# Patient Record
Sex: MALE | Race: ASIAN | NOT HISPANIC OR LATINO | Employment: UNEMPLOYED | URBAN - METROPOLITAN AREA
[De-identification: names, ages, dates, MRNs, and addresses within clinical notes are randomized per-mention and may not be internally consistent; named-entity substitution may affect disease eponyms.]

---

## 2017-01-12 ENCOUNTER — COMMUNICATION - HEALTHEAST (OUTPATIENT)
Dept: FAMILY MEDICINE | Facility: CLINIC | Age: 77
End: 2017-01-12

## 2017-01-12 DIAGNOSIS — K59.00 CONSTIPATION, UNSPECIFIED CONSTIPATION TYPE: ICD-10-CM

## 2017-02-07 ENCOUNTER — COMMUNICATION - HEALTHEAST (OUTPATIENT)
Dept: NURSING | Facility: CLINIC | Age: 77
End: 2017-02-07

## 2017-02-09 ENCOUNTER — OFFICE VISIT - HEALTHEAST (OUTPATIENT)
Dept: FAMILY MEDICINE | Facility: CLINIC | Age: 77
End: 2017-02-09

## 2017-02-09 DIAGNOSIS — R63.0 LOSS OF APPETITE: ICD-10-CM

## 2017-02-09 DIAGNOSIS — G47.00 INSOMNIA: ICD-10-CM

## 2017-02-09 DIAGNOSIS — K59.00 CONSTIPATION, UNSPECIFIED CONSTIPATION TYPE: ICD-10-CM

## 2017-02-09 DIAGNOSIS — R42 DIZZINESS: ICD-10-CM

## 2017-02-09 DIAGNOSIS — R53.83 FATIGUE: ICD-10-CM

## 2017-02-09 DIAGNOSIS — R32 INCONTINENCE: ICD-10-CM

## 2017-02-09 ASSESSMENT — MIFFLIN-ST. JEOR: SCORE: 1062.16

## 2017-02-14 ENCOUNTER — COMMUNICATION - HEALTHEAST (OUTPATIENT)
Dept: NURSING | Facility: CLINIC | Age: 77
End: 2017-02-14

## 2017-02-16 ENCOUNTER — COMMUNICATION - HEALTHEAST (OUTPATIENT)
Dept: FAMILY MEDICINE | Facility: CLINIC | Age: 77
End: 2017-02-16

## 2017-02-16 ENCOUNTER — RECORDS - HEALTHEAST (OUTPATIENT)
Dept: ADMINISTRATIVE | Facility: OTHER | Age: 77
End: 2017-02-16

## 2017-02-20 ENCOUNTER — AMBULATORY - HEALTHEAST (OUTPATIENT)
Dept: CARE COORDINATION | Facility: CLINIC | Age: 77
End: 2017-02-20

## 2017-03-21 ENCOUNTER — OFFICE VISIT - HEALTHEAST (OUTPATIENT)
Dept: FAMILY MEDICINE | Facility: CLINIC | Age: 77
End: 2017-03-21

## 2017-03-21 DIAGNOSIS — G44.85 PRIMARY STABBING HEADACHE: ICD-10-CM

## 2017-03-21 ASSESSMENT — MIFFLIN-ST. JEOR: SCORE: 1061.87

## 2017-04-19 ENCOUNTER — OFFICE VISIT - HEALTHEAST (OUTPATIENT)
Dept: FAMILY MEDICINE | Facility: CLINIC | Age: 77
End: 2017-04-19

## 2017-04-19 DIAGNOSIS — R63.0 LOSS OF APPETITE: ICD-10-CM

## 2017-04-19 DIAGNOSIS — K59.00 CONSTIPATION: ICD-10-CM

## 2017-04-19 DIAGNOSIS — H90.5 SNHL (SENSORINEURAL HEARING LOSS): ICD-10-CM

## 2017-04-19 DIAGNOSIS — G47.00 INSOMNIA: ICD-10-CM

## 2017-04-25 ENCOUNTER — COMMUNICATION - HEALTHEAST (OUTPATIENT)
Dept: FAMILY MEDICINE | Facility: CLINIC | Age: 77
End: 2017-04-25

## 2017-04-25 DIAGNOSIS — K59.00 CONSTIPATION: ICD-10-CM

## 2017-07-19 ENCOUNTER — AMBULATORY - HEALTHEAST (OUTPATIENT)
Dept: FAMILY MEDICINE | Facility: CLINIC | Age: 77
End: 2017-07-19

## 2017-07-19 ENCOUNTER — OFFICE VISIT - HEALTHEAST (OUTPATIENT)
Dept: FAMILY MEDICINE | Facility: CLINIC | Age: 77
End: 2017-07-19

## 2017-07-19 DIAGNOSIS — G47.00 INSOMNIA: ICD-10-CM

## 2017-07-19 DIAGNOSIS — M25.50 MULTIPLE JOINT PAIN: ICD-10-CM

## 2017-07-19 DIAGNOSIS — M54.9 BACK PAIN: ICD-10-CM

## 2017-07-19 DIAGNOSIS — R41.3 MEMORY DIFFICULTY: ICD-10-CM

## 2017-07-19 ASSESSMENT — MIFFLIN-ST. JEOR: SCORE: 1071.8

## 2017-07-20 LAB — SYPHILIS RPR SCREEN - HISTORICAL: NORMAL

## 2017-08-10 ENCOUNTER — AMBULATORY - HEALTHEAST (OUTPATIENT)
Dept: CARE COORDINATION | Facility: CLINIC | Age: 77
End: 2017-08-10

## 2017-08-10 ENCOUNTER — COMMUNICATION - HEALTHEAST (OUTPATIENT)
Dept: NURSING | Facility: CLINIC | Age: 77
End: 2017-08-10

## 2017-10-19 ENCOUNTER — OFFICE VISIT - HEALTHEAST (OUTPATIENT)
Dept: FAMILY MEDICINE | Facility: CLINIC | Age: 77
End: 2017-10-19

## 2017-10-19 DIAGNOSIS — R41.3 MEMORY DIFFICULTY: ICD-10-CM

## 2017-10-19 DIAGNOSIS — M54.9 BACK PAIN: ICD-10-CM

## 2017-10-19 DIAGNOSIS — R63.0 LOSS OF APPETITE: ICD-10-CM

## 2017-10-19 DIAGNOSIS — F32.A DEPRESSION: ICD-10-CM

## 2017-10-19 DIAGNOSIS — G47.00 INSOMNIA: ICD-10-CM

## 2017-10-19 DIAGNOSIS — Z23 IMMUNIZATION DUE: ICD-10-CM

## 2017-10-19 ASSESSMENT — MIFFLIN-ST. JEOR: SCORE: 1093.91

## 2017-10-30 ENCOUNTER — HOSPITAL ENCOUNTER (OUTPATIENT)
Dept: CT IMAGING | Facility: HOSPITAL | Age: 77
Discharge: HOME | End: 2017-10-30
Attending: FAMILY MEDICINE

## 2017-10-30 DIAGNOSIS — R41.3 MEMORY DIFFICULTY: ICD-10-CM

## 2018-02-16 ENCOUNTER — OFFICE VISIT - HEALTHEAST (OUTPATIENT)
Dept: FAMILY MEDICINE | Facility: CLINIC | Age: 78
End: 2018-02-16

## 2018-02-16 DIAGNOSIS — L30.9 ECZEMA: ICD-10-CM

## 2018-02-16 DIAGNOSIS — R63.0 LOSS OF APPETITE: ICD-10-CM

## 2018-02-16 DIAGNOSIS — H90.5 SNHL (SENSORINEURAL HEARING LOSS): ICD-10-CM

## 2018-02-16 DIAGNOSIS — F32.A DEPRESSION: ICD-10-CM

## 2018-02-16 DIAGNOSIS — M54.9 BACK PAIN: ICD-10-CM

## 2018-02-16 ASSESSMENT — MIFFLIN-ST. JEOR: SCORE: 1103.83

## 2018-05-18 ENCOUNTER — OFFICE VISIT - HEALTHEAST (OUTPATIENT)
Dept: FAMILY MEDICINE | Facility: CLINIC | Age: 78
End: 2018-05-18

## 2018-05-18 DIAGNOSIS — H90.5 SNHL (SENSORINEURAL HEARING LOSS): ICD-10-CM

## 2018-05-18 DIAGNOSIS — R63.0 LOSS OF APPETITE: ICD-10-CM

## 2018-05-18 DIAGNOSIS — F32.A DEPRESSION: ICD-10-CM

## 2018-05-18 DIAGNOSIS — M54.9 BACK PAIN: ICD-10-CM

## 2018-05-18 ASSESSMENT — MIFFLIN-ST. JEOR: SCORE: 1126.51

## 2018-07-20 ENCOUNTER — OFFICE VISIT - HEALTHEAST (OUTPATIENT)
Dept: FAMILY MEDICINE | Facility: CLINIC | Age: 78
End: 2018-07-20

## 2018-07-20 ENCOUNTER — COMMUNICATION - HEALTHEAST (OUTPATIENT)
Dept: FAMILY MEDICINE | Facility: CLINIC | Age: 78
End: 2018-07-20

## 2018-07-20 DIAGNOSIS — M54.9 BACK PAIN: ICD-10-CM

## 2018-07-20 DIAGNOSIS — K08.89 LOOSE, TEETH: ICD-10-CM

## 2018-07-20 DIAGNOSIS — H91.90 HEARING LOSS: ICD-10-CM

## 2018-07-20 DIAGNOSIS — F32.0 MILD SINGLE CURRENT EPISODE OF MAJOR DEPRESSIVE DISORDER (H): ICD-10-CM

## 2018-07-20 DIAGNOSIS — R63.0 LOSS OF APPETITE: ICD-10-CM

## 2018-07-20 ASSESSMENT — MIFFLIN-ST. JEOR: SCORE: 1126.79

## 2018-10-24 ENCOUNTER — OFFICE VISIT - HEALTHEAST (OUTPATIENT)
Dept: FAMILY MEDICINE | Facility: CLINIC | Age: 78
End: 2018-10-24

## 2018-10-24 DIAGNOSIS — Z22.7 TB LUNG, LATENT: ICD-10-CM

## 2018-10-24 DIAGNOSIS — Z23 NEED FOR INFLUENZA VACCINATION: ICD-10-CM

## 2018-10-24 DIAGNOSIS — G47.00 INSOMNIA: ICD-10-CM

## 2018-10-24 DIAGNOSIS — M54.9 BACK PAIN: ICD-10-CM

## 2018-10-24 DIAGNOSIS — F32.0 MILD SINGLE CURRENT EPISODE OF MAJOR DEPRESSIVE DISORDER (H): ICD-10-CM

## 2018-10-24 DIAGNOSIS — H91.90 HEARING LOSS: ICD-10-CM

## 2018-10-24 DIAGNOSIS — K08.109 TEETH MISSING: ICD-10-CM

## 2018-10-24 ASSESSMENT — MIFFLIN-ST. JEOR: SCORE: 1111.77

## 2019-03-14 ENCOUNTER — RECORDS - HEALTHEAST (OUTPATIENT)
Dept: ADMINISTRATIVE | Facility: OTHER | Age: 79
End: 2019-03-14

## 2019-04-02 ENCOUNTER — COMMUNICATION - HEALTHEAST (OUTPATIENT)
Dept: FAMILY MEDICINE | Facility: CLINIC | Age: 79
End: 2019-04-02

## 2019-05-03 ENCOUNTER — OFFICE VISIT - HEALTHEAST (OUTPATIENT)
Dept: FAMILY MEDICINE | Facility: CLINIC | Age: 79
End: 2019-05-03

## 2019-05-03 DIAGNOSIS — M54.9 BACK PAIN: ICD-10-CM

## 2019-05-03 DIAGNOSIS — H04.123 DRY EYES: ICD-10-CM

## 2019-05-03 DIAGNOSIS — G89.29 CHRONIC PAIN OF BOTH KNEES: ICD-10-CM

## 2019-05-03 DIAGNOSIS — K08.109 TEETH MISSING: ICD-10-CM

## 2019-05-03 DIAGNOSIS — F32.0 MILD SINGLE CURRENT EPISODE OF MAJOR DEPRESSIVE DISORDER (H): ICD-10-CM

## 2019-05-03 DIAGNOSIS — M25.561 CHRONIC PAIN OF BOTH KNEES: ICD-10-CM

## 2019-05-03 DIAGNOSIS — R63.0 LOSS OF APPETITE: ICD-10-CM

## 2019-05-03 DIAGNOSIS — M25.562 CHRONIC PAIN OF BOTH KNEES: ICD-10-CM

## 2019-05-03 ASSESSMENT — MIFFLIN-ST. JEOR: SCORE: 1102.7

## 2019-05-05 ENCOUNTER — COMMUNICATION - HEALTHEAST (OUTPATIENT)
Dept: FAMILY MEDICINE | Facility: CLINIC | Age: 79
End: 2019-05-05

## 2019-05-09 ENCOUNTER — AMBULATORY - HEALTHEAST (OUTPATIENT)
Dept: FAMILY MEDICINE | Facility: CLINIC | Age: 79
End: 2019-05-09

## 2019-05-09 DIAGNOSIS — H04.123 DRY EYES: ICD-10-CM

## 2019-05-16 ENCOUNTER — OFFICE VISIT - HEALTHEAST (OUTPATIENT)
Dept: FAMILY MEDICINE | Facility: CLINIC | Age: 79
End: 2019-05-16

## 2019-05-16 DIAGNOSIS — G47.00 INSOMNIA, UNSPECIFIED TYPE: ICD-10-CM

## 2019-05-16 DIAGNOSIS — R55 SYNCOPE, UNSPECIFIED SYNCOPE TYPE: ICD-10-CM

## 2019-05-16 DIAGNOSIS — F32.A DEPRESSION, UNSPECIFIED DEPRESSION TYPE: ICD-10-CM

## 2019-05-16 DIAGNOSIS — R42 DIZZINESS: ICD-10-CM

## 2019-05-16 DIAGNOSIS — M25.562 LEFT KNEE PAIN, UNSPECIFIED CHRONICITY: ICD-10-CM

## 2019-05-16 LAB
ALBUMIN SERPL-MCNC: 4.3 G/DL (ref 3.5–5)
ALP SERPL-CCNC: 69 U/L (ref 45–120)
ALT SERPL W P-5'-P-CCNC: 27 U/L (ref 0–45)
ANION GAP SERPL CALCULATED.3IONS-SCNC: 12 MMOL/L (ref 5–18)
AST SERPL W P-5'-P-CCNC: 38 U/L (ref 0–40)
BASOPHILS # BLD AUTO: 0 THOU/UL (ref 0–0.2)
BASOPHILS NFR BLD AUTO: 0 % (ref 0–2)
BILIRUB SERPL-MCNC: 0.5 MG/DL (ref 0–1)
BUN SERPL-MCNC: 23 MG/DL (ref 8–28)
CALCIUM SERPL-MCNC: 9.9 MG/DL (ref 8.5–10.5)
CHLORIDE BLD-SCNC: 104 MMOL/L (ref 98–107)
CO2 SERPL-SCNC: 23 MMOL/L (ref 22–31)
CREAT SERPL-MCNC: 1.04 MG/DL (ref 0.7–1.3)
EOSINOPHIL # BLD AUTO: 0.4 THOU/UL (ref 0–0.4)
EOSINOPHIL NFR BLD AUTO: 5 % (ref 0–6)
ERYTHROCYTE [DISTWIDTH] IN BLOOD BY AUTOMATED COUNT: 12.4 % (ref 11–14.5)
GFR SERPL CREATININE-BSD FRML MDRD: >60 ML/MIN/1.73M2
GLUCOSE BLD-MCNC: 81 MG/DL (ref 70–125)
HCT VFR BLD AUTO: 42 % (ref 40–54)
HGB BLD-MCNC: 13.9 G/DL (ref 14–18)
LYMPHOCYTES # BLD AUTO: 2 THOU/UL (ref 0.8–4.4)
LYMPHOCYTES NFR BLD AUTO: 28 % (ref 20–40)
MCH RBC QN AUTO: 27.8 PG (ref 27–34)
MCHC RBC AUTO-ENTMCNC: 33.1 G/DL (ref 32–36)
MCV RBC AUTO: 84 FL (ref 80–100)
MONOCYTES # BLD AUTO: 0.6 THOU/UL (ref 0–0.9)
MONOCYTES NFR BLD AUTO: 9 % (ref 2–10)
NEUTROPHILS # BLD AUTO: 4.2 THOU/UL (ref 2–7.7)
NEUTROPHILS NFR BLD AUTO: 58 % (ref 50–70)
PLATELET # BLD AUTO: 220 THOU/UL (ref 140–440)
PMV BLD AUTO: 7.6 FL (ref 7–10)
POTASSIUM BLD-SCNC: 4.4 MMOL/L (ref 3.5–5)
PROT SERPL-MCNC: 8 G/DL (ref 6–8)
RBC # BLD AUTO: 5 MILL/UL (ref 4.4–6.2)
SODIUM SERPL-SCNC: 139 MMOL/L (ref 136–145)
WBC: 7.3 THOU/UL (ref 4–11)

## 2019-05-16 ASSESSMENT — MIFFLIN-ST. JEOR: SCORE: 1098.16

## 2019-05-20 ENCOUNTER — HOSPITAL ENCOUNTER (OUTPATIENT)
Dept: ULTRASOUND IMAGING | Facility: HOSPITAL | Age: 79
Discharge: HOME | End: 2019-05-20
Attending: FAMILY MEDICINE

## 2019-05-20 ENCOUNTER — HOSPITAL ENCOUNTER (OUTPATIENT)
Dept: CT IMAGING | Facility: HOSPITAL | Age: 79
Discharge: HOME | End: 2019-05-20
Attending: FAMILY MEDICINE

## 2019-05-20 DIAGNOSIS — R55 SYNCOPE, UNSPECIFIED SYNCOPE TYPE: ICD-10-CM

## 2019-06-14 ENCOUNTER — OFFICE VISIT - HEALTHEAST (OUTPATIENT)
Dept: FAMILY MEDICINE | Facility: CLINIC | Age: 79
End: 2019-06-14

## 2019-06-14 ENCOUNTER — COMMUNICATION - HEALTHEAST (OUTPATIENT)
Dept: NURSING | Facility: CLINIC | Age: 79
End: 2019-06-14

## 2019-06-14 DIAGNOSIS — M54.50 BILATERAL LOW BACK PAIN WITHOUT SCIATICA, UNSPECIFIED CHRONICITY: ICD-10-CM

## 2019-06-14 DIAGNOSIS — G47.00 INSOMNIA, UNSPECIFIED TYPE: ICD-10-CM

## 2019-06-14 DIAGNOSIS — H90.3 SENSORINEURAL HEARING LOSS (SNHL) OF BOTH EARS: ICD-10-CM

## 2019-06-14 DIAGNOSIS — R41.3 MEMORY DIFFICULTY: ICD-10-CM

## 2019-06-14 DIAGNOSIS — F32.0 MILD SINGLE CURRENT EPISODE OF MAJOR DEPRESSIVE DISORDER (H): ICD-10-CM

## 2019-06-14 ASSESSMENT — MIFFLIN-ST. JEOR: SCORE: 1108.93

## 2019-06-24 ENCOUNTER — RECORDS - HEALTHEAST (OUTPATIENT)
Dept: ADMINISTRATIVE | Facility: OTHER | Age: 79
End: 2019-06-24

## 2019-07-02 ENCOUNTER — COMMUNICATION - HEALTHEAST (OUTPATIENT)
Dept: NURSING | Facility: CLINIC | Age: 79
End: 2019-07-02

## 2019-07-08 ENCOUNTER — COMMUNICATION - HEALTHEAST (OUTPATIENT)
Dept: NURSING | Facility: CLINIC | Age: 79
End: 2019-07-08

## 2019-07-11 ENCOUNTER — COMMUNICATION - HEALTHEAST (OUTPATIENT)
Dept: NURSING | Facility: CLINIC | Age: 79
End: 2019-07-11

## 2019-07-17 ENCOUNTER — RECORDS - HEALTHEAST (OUTPATIENT)
Dept: GENERAL RADIOLOGY | Facility: CLINIC | Age: 79
End: 2019-07-17

## 2019-07-17 ENCOUNTER — OFFICE VISIT - HEALTHEAST (OUTPATIENT)
Dept: FAMILY MEDICINE | Facility: CLINIC | Age: 79
End: 2019-07-17

## 2019-07-17 DIAGNOSIS — M25.562 PAIN IN LEFT KNEE: ICD-10-CM

## 2019-07-17 DIAGNOSIS — G89.29 OTHER CHRONIC PAIN: ICD-10-CM

## 2019-07-17 DIAGNOSIS — M54.50 BILATERAL LOW BACK PAIN WITHOUT SCIATICA, UNSPECIFIED CHRONICITY: ICD-10-CM

## 2019-07-17 DIAGNOSIS — R42 DIZZINESS: ICD-10-CM

## 2019-07-17 DIAGNOSIS — M25.562 CHRONIC PAIN OF LEFT KNEE: ICD-10-CM

## 2019-07-17 DIAGNOSIS — Z02.89 ENCOUNTER FOR COMPLETION OF FORM WITH PATIENT: ICD-10-CM

## 2019-07-17 DIAGNOSIS — G89.29 CHRONIC PAIN OF LEFT KNEE: ICD-10-CM

## 2019-07-17 ASSESSMENT — MIFFLIN-ST. JEOR: SCORE: 1116.3

## 2019-07-18 ENCOUNTER — COMMUNICATION - HEALTHEAST (OUTPATIENT)
Dept: FAMILY MEDICINE | Facility: CLINIC | Age: 79
End: 2019-07-18

## 2019-07-19 ENCOUNTER — COMMUNICATION - HEALTHEAST (OUTPATIENT)
Dept: NURSING | Facility: CLINIC | Age: 79
End: 2019-07-19

## 2019-07-22 ENCOUNTER — COMMUNICATION - HEALTHEAST (OUTPATIENT)
Dept: FAMILY MEDICINE | Facility: CLINIC | Age: 79
End: 2019-07-22

## 2019-07-22 DIAGNOSIS — M25.562 CHRONIC PAIN OF LEFT KNEE: ICD-10-CM

## 2019-07-22 DIAGNOSIS — G89.29 CHRONIC PAIN OF LEFT KNEE: ICD-10-CM

## 2019-07-22 DIAGNOSIS — M47.817 FACET ARTHRITIS OF LUMBOSACRAL REGION: ICD-10-CM

## 2019-08-05 ENCOUNTER — HOSPITAL ENCOUNTER (OUTPATIENT)
Dept: PHYSICAL MEDICINE AND REHAB | Facility: CLINIC | Age: 79
Discharge: HOME | End: 2019-08-05
Attending: FAMILY MEDICINE

## 2019-08-05 DIAGNOSIS — M54.50 CHRONIC BILATERAL LOW BACK PAIN WITHOUT SCIATICA: ICD-10-CM

## 2019-08-05 DIAGNOSIS — M25.562 CHRONIC PAIN OF LEFT KNEE: ICD-10-CM

## 2019-08-05 DIAGNOSIS — M51.369 DDD (DEGENERATIVE DISC DISEASE), LUMBAR: ICD-10-CM

## 2019-08-05 DIAGNOSIS — R20.0 NUMBNESS AND TINGLING OF LEFT LOWER EXTREMITY: ICD-10-CM

## 2019-08-05 DIAGNOSIS — R20.2 NUMBNESS AND TINGLING OF LEFT LOWER EXTREMITY: ICD-10-CM

## 2019-08-05 DIAGNOSIS — G89.29 CHRONIC PAIN OF LEFT KNEE: ICD-10-CM

## 2019-08-05 DIAGNOSIS — G89.29 CHRONIC BILATERAL LOW BACK PAIN WITHOUT SCIATICA: ICD-10-CM

## 2019-08-05 DIAGNOSIS — M47.816 LUMBAR FACET ARTHROPATHY: ICD-10-CM

## 2019-08-05 ASSESSMENT — MIFFLIN-ST. JEOR: SCORE: 1093.62

## 2019-08-08 ENCOUNTER — OFFICE VISIT - HEALTHEAST (OUTPATIENT)
Dept: PHYSICAL THERAPY | Facility: REHABILITATION | Age: 79
End: 2019-08-08

## 2019-08-08 DIAGNOSIS — M62.81 GENERALIZED MUSCLE WEAKNESS: ICD-10-CM

## 2019-08-08 DIAGNOSIS — R26.81 UNSTEADINESS ON FEET: ICD-10-CM

## 2019-08-08 DIAGNOSIS — M54.42 CHRONIC BILATERAL LOW BACK PAIN WITH LEFT-SIDED SCIATICA: ICD-10-CM

## 2019-08-08 DIAGNOSIS — G89.29 CHRONIC BILATERAL LOW BACK PAIN WITH LEFT-SIDED SCIATICA: ICD-10-CM

## 2019-08-13 ENCOUNTER — OFFICE VISIT - HEALTHEAST (OUTPATIENT)
Dept: AUDIOLOGY | Facility: CLINIC | Age: 79
End: 2019-08-13

## 2019-08-13 DIAGNOSIS — H90.3 SENSORINEURAL HEARING LOSS, BILATERAL: ICD-10-CM

## 2019-08-20 ENCOUNTER — RECORDS - HEALTHEAST (OUTPATIENT)
Dept: ADMINISTRATIVE | Facility: OTHER | Age: 79
End: 2019-08-20

## 2019-08-23 ENCOUNTER — HOSPITAL ENCOUNTER (OUTPATIENT)
Dept: MRI IMAGING | Facility: HOSPITAL | Age: 79
Discharge: HOME | End: 2019-08-23

## 2019-08-23 DIAGNOSIS — G89.29 CHRONIC BILATERAL LOW BACK PAIN WITHOUT SCIATICA: ICD-10-CM

## 2019-08-23 DIAGNOSIS — M51.369 DDD (DEGENERATIVE DISC DISEASE), LUMBAR: ICD-10-CM

## 2019-08-23 DIAGNOSIS — R20.2 NUMBNESS AND TINGLING OF LEFT LOWER EXTREMITY: ICD-10-CM

## 2019-08-23 DIAGNOSIS — M54.50 CHRONIC BILATERAL LOW BACK PAIN WITHOUT SCIATICA: ICD-10-CM

## 2019-08-23 DIAGNOSIS — R20.0 NUMBNESS AND TINGLING OF LEFT LOWER EXTREMITY: ICD-10-CM

## 2019-08-23 DIAGNOSIS — M47.816 LUMBAR FACET ARTHROPATHY: ICD-10-CM

## 2019-08-27 ENCOUNTER — OFFICE VISIT - HEALTHEAST (OUTPATIENT)
Dept: PHYSICAL THERAPY | Facility: REHABILITATION | Age: 79
End: 2019-08-27

## 2019-08-27 ENCOUNTER — COMMUNICATION - HEALTHEAST (OUTPATIENT)
Dept: PHYSICAL MEDICINE AND REHAB | Facility: CLINIC | Age: 79
End: 2019-08-27

## 2019-08-27 DIAGNOSIS — R26.81 UNSTEADINESS ON FEET: ICD-10-CM

## 2019-08-27 DIAGNOSIS — M62.81 GENERALIZED MUSCLE WEAKNESS: ICD-10-CM

## 2019-08-27 DIAGNOSIS — M54.42 CHRONIC BILATERAL LOW BACK PAIN WITH LEFT-SIDED SCIATICA: ICD-10-CM

## 2019-08-27 DIAGNOSIS — G89.29 CHRONIC BILATERAL LOW BACK PAIN WITH LEFT-SIDED SCIATICA: ICD-10-CM

## 2019-08-29 ENCOUNTER — OFFICE VISIT - HEALTHEAST (OUTPATIENT)
Dept: PHYSICAL THERAPY | Facility: REHABILITATION | Age: 79
End: 2019-08-29

## 2019-08-29 DIAGNOSIS — M54.42 CHRONIC BILATERAL LOW BACK PAIN WITH LEFT-SIDED SCIATICA: ICD-10-CM

## 2019-08-29 DIAGNOSIS — G89.29 CHRONIC BILATERAL LOW BACK PAIN WITH LEFT-SIDED SCIATICA: ICD-10-CM

## 2019-08-29 DIAGNOSIS — M62.81 GENERALIZED MUSCLE WEAKNESS: ICD-10-CM

## 2019-08-29 DIAGNOSIS — R26.81 UNSTEADINESS ON FEET: ICD-10-CM

## 2019-09-03 ENCOUNTER — OFFICE VISIT - HEALTHEAST (OUTPATIENT)
Dept: PHYSICAL THERAPY | Facility: REHABILITATION | Age: 79
End: 2019-09-03

## 2019-09-03 DIAGNOSIS — M62.81 GENERALIZED MUSCLE WEAKNESS: ICD-10-CM

## 2019-09-03 DIAGNOSIS — R26.81 UNSTEADINESS ON FEET: ICD-10-CM

## 2019-09-03 DIAGNOSIS — G89.29 CHRONIC BILATERAL LOW BACK PAIN WITH LEFT-SIDED SCIATICA: ICD-10-CM

## 2019-09-03 DIAGNOSIS — M54.42 CHRONIC BILATERAL LOW BACK PAIN WITH LEFT-SIDED SCIATICA: ICD-10-CM

## 2019-09-05 ENCOUNTER — OFFICE VISIT - HEALTHEAST (OUTPATIENT)
Dept: PHYSICAL THERAPY | Facility: REHABILITATION | Age: 79
End: 2019-09-05

## 2019-09-05 DIAGNOSIS — R26.81 UNSTEADINESS ON FEET: ICD-10-CM

## 2019-09-05 DIAGNOSIS — G89.29 CHRONIC BILATERAL LOW BACK PAIN WITH LEFT-SIDED SCIATICA: ICD-10-CM

## 2019-09-05 DIAGNOSIS — M62.81 GENERALIZED MUSCLE WEAKNESS: ICD-10-CM

## 2019-09-05 DIAGNOSIS — M54.42 CHRONIC BILATERAL LOW BACK PAIN WITH LEFT-SIDED SCIATICA: ICD-10-CM

## 2019-09-10 ENCOUNTER — OFFICE VISIT - HEALTHEAST (OUTPATIENT)
Dept: PHYSICAL THERAPY | Facility: REHABILITATION | Age: 79
End: 2019-09-10

## 2019-09-10 DIAGNOSIS — M62.81 GENERALIZED MUSCLE WEAKNESS: ICD-10-CM

## 2019-09-10 DIAGNOSIS — G89.29 CHRONIC BILATERAL LOW BACK PAIN WITH LEFT-SIDED SCIATICA: ICD-10-CM

## 2019-09-10 DIAGNOSIS — M54.42 CHRONIC BILATERAL LOW BACK PAIN WITH LEFT-SIDED SCIATICA: ICD-10-CM

## 2019-09-10 DIAGNOSIS — R26.81 UNSTEADINESS ON FEET: ICD-10-CM

## 2019-09-12 ENCOUNTER — OFFICE VISIT - HEALTHEAST (OUTPATIENT)
Dept: PHYSICAL THERAPY | Facility: REHABILITATION | Age: 79
End: 2019-09-12

## 2019-09-12 DIAGNOSIS — M54.42 CHRONIC BILATERAL LOW BACK PAIN WITH LEFT-SIDED SCIATICA: ICD-10-CM

## 2019-09-12 DIAGNOSIS — R26.81 UNSTEADINESS ON FEET: ICD-10-CM

## 2019-09-12 DIAGNOSIS — M62.81 GENERALIZED MUSCLE WEAKNESS: ICD-10-CM

## 2019-09-12 DIAGNOSIS — G89.29 CHRONIC BILATERAL LOW BACK PAIN WITH LEFT-SIDED SCIATICA: ICD-10-CM

## 2019-09-17 ENCOUNTER — OFFICE VISIT - HEALTHEAST (OUTPATIENT)
Dept: PHYSICAL THERAPY | Facility: REHABILITATION | Age: 79
End: 2019-09-17

## 2019-09-17 DIAGNOSIS — G89.29 CHRONIC BILATERAL LOW BACK PAIN WITH LEFT-SIDED SCIATICA: ICD-10-CM

## 2019-09-17 DIAGNOSIS — R26.81 UNSTEADINESS ON FEET: ICD-10-CM

## 2019-09-17 DIAGNOSIS — M62.81 GENERALIZED MUSCLE WEAKNESS: ICD-10-CM

## 2019-09-17 DIAGNOSIS — M54.42 CHRONIC BILATERAL LOW BACK PAIN WITH LEFT-SIDED SCIATICA: ICD-10-CM

## 2019-09-18 ENCOUNTER — COMMUNICATION - HEALTHEAST (OUTPATIENT)
Dept: AUDIOLOGY | Facility: CLINIC | Age: 79
End: 2019-09-18

## 2019-09-19 ENCOUNTER — OFFICE VISIT - HEALTHEAST (OUTPATIENT)
Dept: PHYSICAL THERAPY | Facility: REHABILITATION | Age: 79
End: 2019-09-19

## 2019-09-19 DIAGNOSIS — G89.29 CHRONIC BILATERAL LOW BACK PAIN WITH LEFT-SIDED SCIATICA: ICD-10-CM

## 2019-09-19 DIAGNOSIS — R26.81 UNSTEADINESS ON FEET: ICD-10-CM

## 2019-09-19 DIAGNOSIS — M54.42 CHRONIC BILATERAL LOW BACK PAIN WITH LEFT-SIDED SCIATICA: ICD-10-CM

## 2019-09-19 DIAGNOSIS — M62.81 GENERALIZED MUSCLE WEAKNESS: ICD-10-CM

## 2019-09-25 ENCOUNTER — COMMUNICATION - HEALTHEAST (OUTPATIENT)
Dept: FAMILY MEDICINE | Facility: CLINIC | Age: 79
End: 2019-09-25

## 2019-09-25 ENCOUNTER — COMMUNICATION - HEALTHEAST (OUTPATIENT)
Dept: NURSING | Facility: CLINIC | Age: 79
End: 2019-09-25

## 2019-09-25 ENCOUNTER — OFFICE VISIT - HEALTHEAST (OUTPATIENT)
Dept: FAMILY MEDICINE | Facility: CLINIC | Age: 79
End: 2019-09-25

## 2019-09-25 DIAGNOSIS — M54.50 BILATERAL LOW BACK PAIN WITHOUT SCIATICA, UNSPECIFIED CHRONICITY: ICD-10-CM

## 2019-09-25 DIAGNOSIS — R21 RASH OF NECK: ICD-10-CM

## 2019-09-25 DIAGNOSIS — Z23 NEED FOR INFLUENZA VACCINATION: ICD-10-CM

## 2019-09-25 DIAGNOSIS — R42 DIZZINESS: ICD-10-CM

## 2019-09-25 DIAGNOSIS — M25.562 CHRONIC PAIN OF LEFT KNEE: ICD-10-CM

## 2019-09-25 DIAGNOSIS — Z59.9 FINANCIAL DIFFICULTY: ICD-10-CM

## 2019-09-25 DIAGNOSIS — F32.0 MILD SINGLE CURRENT EPISODE OF MAJOR DEPRESSIVE DISORDER (H): ICD-10-CM

## 2019-09-25 DIAGNOSIS — G89.29 CHRONIC PAIN OF LEFT KNEE: ICD-10-CM

## 2019-09-25 SDOH — ECONOMIC STABILITY - INCOME SECURITY: PROBLEM RELATED TO HOUSING AND ECONOMIC CIRCUMSTANCES, UNSPECIFIED: Z59.9

## 2019-09-25 ASSESSMENT — PATIENT HEALTH QUESTIONNAIRE - PHQ9: SUM OF ALL RESPONSES TO PHQ QUESTIONS 1-9: 15

## 2019-09-25 ASSESSMENT — MIFFLIN-ST. JEOR: SCORE: 1098.16

## 2019-10-21 ENCOUNTER — AMBULATORY - HEALTHEAST (OUTPATIENT)
Dept: NURSING | Facility: CLINIC | Age: 79
End: 2019-10-21

## 2019-10-21 ENCOUNTER — COMMUNICATION - HEALTHEAST (OUTPATIENT)
Dept: NURSING | Facility: CLINIC | Age: 79
End: 2019-10-21

## 2019-10-21 ASSESSMENT — ACTIVITIES OF DAILY LIVING (ADL): DEPENDENT_IADLS:: CLEANING;COOKING;LAUNDRY;SHOPPING;MEAL PREPARATION;TRANSPORTATION

## 2019-10-30 ENCOUNTER — OFFICE VISIT - HEALTHEAST (OUTPATIENT)
Dept: BEHAVIORAL HEALTH | Facility: CLINIC | Age: 79
End: 2019-10-30

## 2019-10-30 DIAGNOSIS — F43.23 ADJUSTMENT DISORDER WITH MIXED ANXIETY AND DEPRESSED MOOD: ICD-10-CM

## 2019-10-30 ASSESSMENT — ANXIETY QUESTIONNAIRES
7. FEELING AFRAID AS IF SOMETHING AWFUL MIGHT HAPPEN: NOT AT ALL
1. FEELING NERVOUS, ANXIOUS, OR ON EDGE: SEVERAL DAYS
5. BEING SO RESTLESS THAT IT IS HARD TO SIT STILL: NOT AT ALL
4. TROUBLE RELAXING: SEVERAL DAYS
IF YOU CHECKED OFF ANY PROBLEMS ON THIS QUESTIONNAIRE, HOW DIFFICULT HAVE THESE PROBLEMS MADE IT FOR YOU TO DO YOUR WORK, TAKE CARE OF THINGS AT HOME, OR GET ALONG WITH OTHER PEOPLE: SOMEWHAT DIFFICULT
GAD7 TOTAL SCORE: 5
2. NOT BEING ABLE TO STOP OR CONTROL WORRYING: SEVERAL DAYS
3. WORRYING TOO MUCH ABOUT DIFFERENT THINGS: SEVERAL DAYS
6. BECOMING EASILY ANNOYED OR IRRITABLE: SEVERAL DAYS

## 2019-10-30 ASSESSMENT — PATIENT HEALTH QUESTIONNAIRE - PHQ9: SUM OF ALL RESPONSES TO PHQ QUESTIONS 1-9: 8

## 2019-11-08 ENCOUNTER — OFFICE VISIT - HEALTHEAST (OUTPATIENT)
Dept: FAMILY MEDICINE | Facility: CLINIC | Age: 79
End: 2019-11-08

## 2019-11-08 DIAGNOSIS — G89.29 CHRONIC PAIN OF LEFT KNEE: ICD-10-CM

## 2019-11-08 DIAGNOSIS — F32.0 MILD SINGLE CURRENT EPISODE OF MAJOR DEPRESSIVE DISORDER (H): ICD-10-CM

## 2019-11-08 DIAGNOSIS — M54.50 BILATERAL LOW BACK PAIN WITHOUT SCIATICA, UNSPECIFIED CHRONICITY: ICD-10-CM

## 2019-11-08 DIAGNOSIS — G47.00 INSOMNIA, UNSPECIFIED TYPE: ICD-10-CM

## 2019-11-08 DIAGNOSIS — M25.562 CHRONIC PAIN OF LEFT KNEE: ICD-10-CM

## 2019-11-08 ASSESSMENT — MIFFLIN-ST. JEOR: SCORE: 1129.91

## 2019-11-15 ENCOUNTER — AMBULATORY - HEALTHEAST (OUTPATIENT)
Dept: NURSING | Facility: CLINIC | Age: 79
End: 2019-11-15

## 2019-11-20 ENCOUNTER — RECORDS - HEALTHEAST (OUTPATIENT)
Dept: ADMINISTRATIVE | Facility: OTHER | Age: 79
End: 2019-11-20

## 2019-11-20 ENCOUNTER — COMMUNICATION - HEALTHEAST (OUTPATIENT)
Dept: CARE COORDINATION | Facility: CLINIC | Age: 79
End: 2019-11-20

## 2019-11-21 ENCOUNTER — OFFICE VISIT - HEALTHEAST (OUTPATIENT)
Dept: BEHAVIORAL HEALTH | Facility: CLINIC | Age: 79
End: 2019-11-21

## 2019-11-21 DIAGNOSIS — F43.23 ADJUSTMENT DISORDER WITH MIXED ANXIETY AND DEPRESSED MOOD: ICD-10-CM

## 2019-11-22 ENCOUNTER — RECORDS - HEALTHEAST (OUTPATIENT)
Dept: ADMINISTRATIVE | Facility: OTHER | Age: 79
End: 2019-11-22

## 2019-11-25 ENCOUNTER — COMMUNICATION - HEALTHEAST (OUTPATIENT)
Dept: NURSING | Facility: CLINIC | Age: 79
End: 2019-11-25

## 2019-11-26 ENCOUNTER — DOCUMENTATION ONLY (OUTPATIENT)
Dept: OTHER | Facility: CLINIC | Age: 79
End: 2019-11-26

## 2019-11-26 ENCOUNTER — AMBULATORY - HEALTHEAST (OUTPATIENT)
Dept: OTHER | Facility: CLINIC | Age: 79
End: 2019-11-26

## 2019-12-03 ENCOUNTER — COMMUNICATION - HEALTHEAST (OUTPATIENT)
Dept: FAMILY MEDICINE | Facility: CLINIC | Age: 79
End: 2019-12-03

## 2019-12-03 DIAGNOSIS — M25.50 MULTIPLE JOINT PAIN: ICD-10-CM

## 2019-12-09 ENCOUNTER — AMBULATORY - HEALTHEAST (OUTPATIENT)
Dept: BEHAVIORAL HEALTH | Facility: CLINIC | Age: 79
End: 2019-12-09

## 2019-12-09 ENCOUNTER — OFFICE VISIT - HEALTHEAST (OUTPATIENT)
Dept: BEHAVIORAL HEALTH | Facility: CLINIC | Age: 79
End: 2019-12-09

## 2019-12-09 DIAGNOSIS — F43.23 ADJUSTMENT DISORDER WITH MIXED ANXIETY AND DEPRESSED MOOD: ICD-10-CM

## 2019-12-18 ENCOUNTER — COMMUNICATION - HEALTHEAST (OUTPATIENT)
Dept: NURSING | Facility: CLINIC | Age: 79
End: 2019-12-18

## 2019-12-30 ENCOUNTER — COMMUNICATION - HEALTHEAST (OUTPATIENT)
Dept: BEHAVIORAL HEALTH | Facility: CLINIC | Age: 79
End: 2019-12-30

## 2019-12-30 ENCOUNTER — COMMUNICATION - HEALTHEAST (OUTPATIENT)
Dept: NURSING | Facility: CLINIC | Age: 79
End: 2019-12-30

## 2019-12-30 ENCOUNTER — AMBULATORY - HEALTHEAST (OUTPATIENT)
Dept: BEHAVIORAL HEALTH | Facility: CLINIC | Age: 79
End: 2019-12-30

## 2020-01-03 ENCOUNTER — COMMUNICATION - HEALTHEAST (OUTPATIENT)
Dept: CARE COORDINATION | Facility: CLINIC | Age: 80
End: 2020-01-03

## 2020-01-13 ENCOUNTER — COMMUNICATION - HEALTHEAST (OUTPATIENT)
Dept: NURSING | Facility: CLINIC | Age: 80
End: 2020-01-13

## 2020-01-21 ENCOUNTER — OFFICE VISIT - HEALTHEAST (OUTPATIENT)
Dept: FAMILY MEDICINE | Facility: CLINIC | Age: 80
End: 2020-01-21

## 2020-01-21 DIAGNOSIS — M25.562 CHRONIC PAIN OF LEFT KNEE: ICD-10-CM

## 2020-01-21 DIAGNOSIS — M54.50 BILATERAL LOW BACK PAIN WITHOUT SCIATICA, UNSPECIFIED CHRONICITY: ICD-10-CM

## 2020-01-21 DIAGNOSIS — H04.123 DRY EYES: ICD-10-CM

## 2020-01-21 DIAGNOSIS — F32.0 MILD SINGLE CURRENT EPISODE OF MAJOR DEPRESSIVE DISORDER (H): ICD-10-CM

## 2020-01-21 DIAGNOSIS — G89.29 CHRONIC PAIN OF LEFT KNEE: ICD-10-CM

## 2020-01-21 DIAGNOSIS — R07.0 THROAT PAIN: ICD-10-CM

## 2020-01-21 DIAGNOSIS — R05.9 COUGH: ICD-10-CM

## 2020-01-21 DIAGNOSIS — R42 DIZZINESS: ICD-10-CM

## 2020-01-21 LAB — DEPRECATED S PYO AG THROAT QL EIA: NORMAL

## 2020-01-22 LAB — GROUP A STREP BY PCR: NORMAL

## 2020-01-29 ENCOUNTER — COMMUNICATION - HEALTHEAST (OUTPATIENT)
Dept: FAMILY MEDICINE | Facility: CLINIC | Age: 80
End: 2020-01-29

## 2020-01-29 DIAGNOSIS — H04.123 DRY EYES: ICD-10-CM

## 2020-02-19 ENCOUNTER — OFFICE VISIT - HEALTHEAST (OUTPATIENT)
Dept: FAMILY MEDICINE | Facility: CLINIC | Age: 80
End: 2020-02-19

## 2020-02-19 DIAGNOSIS — Z13.1 ENCOUNTER FOR SCREENING FOR DIABETES MELLITUS: ICD-10-CM

## 2020-02-19 DIAGNOSIS — Z00.00 ROUTINE GENERAL MEDICAL EXAMINATION AT A HEALTH CARE FACILITY: ICD-10-CM

## 2020-02-19 DIAGNOSIS — K08.9 POOR DENTITION: ICD-10-CM

## 2020-02-19 DIAGNOSIS — Z13.220 ENCOUNTER FOR SCREENING FOR LIPOID DISORDERS: ICD-10-CM

## 2020-02-19 LAB
CHOLEST SERPL-MCNC: 208 MG/DL
FASTING STATUS PATIENT QL REPORTED: NO
FASTING STATUS PATIENT QL REPORTED: NO
GLUCOSE BLD-MCNC: 71 MG/DL (ref 74–125)
HDLC SERPL-MCNC: 49 MG/DL
LDLC SERPL CALC-MCNC: 137 MG/DL
TRIGL SERPL-MCNC: 110 MG/DL

## 2020-02-19 ASSESSMENT — MIFFLIN-ST. JEOR: SCORE: 1112.68

## 2020-02-21 ENCOUNTER — COMMUNICATION - HEALTHEAST (OUTPATIENT)
Dept: FAMILY MEDICINE | Facility: CLINIC | Age: 80
End: 2020-02-21

## 2020-03-13 ENCOUNTER — COMMUNICATION - HEALTHEAST (OUTPATIENT)
Dept: NURSING | Facility: CLINIC | Age: 80
End: 2020-03-13

## 2020-03-16 ENCOUNTER — COMMUNICATION - HEALTHEAST (OUTPATIENT)
Dept: CARE COORDINATION | Facility: CLINIC | Age: 80
End: 2020-03-16

## 2020-03-18 ENCOUNTER — COMMUNICATION - HEALTHEAST (OUTPATIENT)
Dept: CARE COORDINATION | Facility: CLINIC | Age: 80
End: 2020-03-18

## 2020-03-26 ENCOUNTER — COMMUNICATION - HEALTHEAST (OUTPATIENT)
Dept: NURSING | Facility: CLINIC | Age: 80
End: 2020-03-26

## 2020-03-30 ENCOUNTER — COMMUNICATION - HEALTHEAST (OUTPATIENT)
Dept: NURSING | Facility: CLINIC | Age: 80
End: 2020-03-30

## 2020-04-13 ENCOUNTER — COMMUNICATION - HEALTHEAST (OUTPATIENT)
Dept: NURSING | Facility: CLINIC | Age: 80
End: 2020-04-13

## 2020-05-13 ENCOUNTER — COMMUNICATION - HEALTHEAST (OUTPATIENT)
Dept: NURSING | Facility: CLINIC | Age: 80
End: 2020-05-13

## 2020-05-13 DIAGNOSIS — M54.50 BILATERAL LOW BACK PAIN WITHOUT SCIATICA, UNSPECIFIED CHRONICITY: ICD-10-CM

## 2020-05-13 DIAGNOSIS — G89.29 CHRONIC PAIN OF LEFT KNEE: ICD-10-CM

## 2020-05-13 DIAGNOSIS — R42 DIZZINESS: ICD-10-CM

## 2020-05-13 DIAGNOSIS — M25.562 CHRONIC PAIN OF LEFT KNEE: ICD-10-CM

## 2020-05-13 DIAGNOSIS — H04.123 DRY EYES: ICD-10-CM

## 2020-05-14 ENCOUNTER — RECORDS - HEALTHEAST (OUTPATIENT)
Dept: ADMINISTRATIVE | Facility: OTHER | Age: 80
End: 2020-05-14

## 2020-05-14 ENCOUNTER — COMMUNICATION - HEALTHEAST (OUTPATIENT)
Dept: CARE COORDINATION | Facility: CLINIC | Age: 80
End: 2020-05-14

## 2020-05-14 RX ORDER — OLOPATADINE HYDROCHLORIDE 1 MG/ML
1 SOLUTION/ DROPS OPHTHALMIC 2 TIMES DAILY
Qty: 10 ML | Refills: 1 | Status: SHIPPED | OUTPATIENT
Start: 2020-05-14 | End: 2021-10-06

## 2020-06-03 ENCOUNTER — COMMUNICATION - HEALTHEAST (OUTPATIENT)
Dept: NURSING | Facility: CLINIC | Age: 80
End: 2020-06-03

## 2020-06-17 ENCOUNTER — COMMUNICATION - HEALTHEAST (OUTPATIENT)
Dept: NURSING | Facility: CLINIC | Age: 80
End: 2020-06-17

## 2020-06-20 ENCOUNTER — COMMUNICATION - HEALTHEAST (OUTPATIENT)
Dept: FAMILY MEDICINE | Facility: CLINIC | Age: 80
End: 2020-06-20

## 2020-06-20 DIAGNOSIS — R63.0 LOSS OF APPETITE: ICD-10-CM

## 2020-06-29 ENCOUNTER — COMMUNICATION - HEALTHEAST (OUTPATIENT)
Dept: NURSING | Facility: CLINIC | Age: 80
End: 2020-06-29

## 2020-06-30 ENCOUNTER — COMMUNICATION - HEALTHEAST (OUTPATIENT)
Dept: CARE COORDINATION | Facility: CLINIC | Age: 80
End: 2020-06-30

## 2020-07-31 ENCOUNTER — COMMUNICATION - HEALTHEAST (OUTPATIENT)
Dept: NURSING | Facility: CLINIC | Age: 80
End: 2020-07-31

## 2020-07-31 ENCOUNTER — COMMUNICATION - HEALTHEAST (OUTPATIENT)
Dept: FAMILY MEDICINE | Facility: CLINIC | Age: 80
End: 2020-07-31

## 2020-07-31 DIAGNOSIS — M54.50 CHRONIC BILATERAL LOW BACK PAIN WITHOUT SCIATICA: ICD-10-CM

## 2020-07-31 DIAGNOSIS — G89.29 CHRONIC BILATERAL LOW BACK PAIN WITHOUT SCIATICA: ICD-10-CM

## 2020-07-31 DIAGNOSIS — M25.562 CHRONIC PAIN OF LEFT KNEE: ICD-10-CM

## 2020-07-31 DIAGNOSIS — R63.0 LOSS OF APPETITE: ICD-10-CM

## 2020-07-31 DIAGNOSIS — G89.29 CHRONIC PAIN OF LEFT KNEE: ICD-10-CM

## 2020-07-31 DIAGNOSIS — M54.50 BILATERAL LOW BACK PAIN WITHOUT SCIATICA, UNSPECIFIED CHRONICITY: ICD-10-CM

## 2020-08-12 ENCOUNTER — COMMUNICATION - HEALTHEAST (OUTPATIENT)
Dept: CARE COORDINATION | Facility: CLINIC | Age: 80
End: 2020-08-12

## 2020-08-13 ENCOUNTER — COMMUNICATION - HEALTHEAST (OUTPATIENT)
Dept: NURSING | Facility: CLINIC | Age: 80
End: 2020-08-13

## 2020-09-02 ENCOUNTER — COMMUNICATION - HEALTHEAST (OUTPATIENT)
Dept: FAMILY MEDICINE | Facility: CLINIC | Age: 80
End: 2020-09-02

## 2020-09-02 DIAGNOSIS — R63.0 LOSS OF APPETITE: ICD-10-CM

## 2020-09-02 DIAGNOSIS — F32.0 MILD SINGLE CURRENT EPISODE OF MAJOR DEPRESSIVE DISORDER (H): ICD-10-CM

## 2020-09-03 ENCOUNTER — COMMUNICATION - HEALTHEAST (OUTPATIENT)
Dept: FAMILY MEDICINE | Facility: CLINIC | Age: 80
End: 2020-09-03

## 2020-09-03 DIAGNOSIS — R42 DIZZINESS: ICD-10-CM

## 2020-09-03 DIAGNOSIS — M54.50 BILATERAL LOW BACK PAIN WITHOUT SCIATICA, UNSPECIFIED CHRONICITY: ICD-10-CM

## 2020-09-03 DIAGNOSIS — M25.562 CHRONIC PAIN OF LEFT KNEE: ICD-10-CM

## 2020-09-03 DIAGNOSIS — G89.29 CHRONIC PAIN OF LEFT KNEE: ICD-10-CM

## 2020-09-04 ENCOUNTER — COMMUNICATION - HEALTHEAST (OUTPATIENT)
Dept: SCHEDULING | Facility: CLINIC | Age: 80
End: 2020-09-04

## 2020-09-04 DIAGNOSIS — R63.0 LOSS OF APPETITE: ICD-10-CM

## 2020-09-17 ENCOUNTER — COMMUNICATION - HEALTHEAST (OUTPATIENT)
Dept: NURSING | Facility: CLINIC | Age: 80
End: 2020-09-17

## 2020-09-22 ENCOUNTER — COMMUNICATION - HEALTHEAST (OUTPATIENT)
Dept: NURSING | Facility: CLINIC | Age: 80
End: 2020-09-22

## 2020-10-01 ENCOUNTER — COMMUNICATION - HEALTHEAST (OUTPATIENT)
Dept: NURSING | Facility: CLINIC | Age: 80
End: 2020-10-01

## 2020-10-06 ENCOUNTER — COMMUNICATION - HEALTHEAST (OUTPATIENT)
Dept: NURSING | Facility: CLINIC | Age: 80
End: 2020-10-06

## 2020-10-14 ENCOUNTER — COMMUNICATION - HEALTHEAST (OUTPATIENT)
Dept: NURSING | Facility: CLINIC | Age: 80
End: 2020-10-14

## 2020-10-14 ASSESSMENT — ACTIVITIES OF DAILY LIVING (ADL): DEPENDENT_IADLS:: CLEANING;COOKING;LAUNDRY;SHOPPING;MEAL PREPARATION;MEDICATION MANAGEMENT

## 2020-11-10 ENCOUNTER — OFFICE VISIT - HEALTHEAST (OUTPATIENT)
Dept: FAMILY MEDICINE | Facility: CLINIC | Age: 80
End: 2020-11-10

## 2020-11-10 DIAGNOSIS — R76.12 POSITIVE QUANTIFERON-TB GOLD TEST: ICD-10-CM

## 2020-11-10 DIAGNOSIS — J06.9 UPPER RESPIRATORY TRACT INFECTION, UNSPECIFIED TYPE: ICD-10-CM

## 2020-11-13 ENCOUNTER — COMMUNICATION - HEALTHEAST (OUTPATIENT)
Dept: NURSING | Facility: CLINIC | Age: 80
End: 2020-11-13

## 2020-11-16 ENCOUNTER — COMMUNICATION - HEALTHEAST (OUTPATIENT)
Dept: NURSING | Facility: CLINIC | Age: 80
End: 2020-11-16

## 2020-11-17 ENCOUNTER — AMBULATORY - HEALTHEAST (OUTPATIENT)
Dept: FAMILY MEDICINE | Facility: CLINIC | Age: 80
End: 2020-11-17

## 2020-11-17 ENCOUNTER — COMMUNICATION - HEALTHEAST (OUTPATIENT)
Dept: NURSING | Facility: CLINIC | Age: 80
End: 2020-11-17

## 2020-11-17 DIAGNOSIS — R41.3 MEMORY DIFFICULTY: ICD-10-CM

## 2020-11-17 DIAGNOSIS — F32.0 MILD SINGLE CURRENT EPISODE OF MAJOR DEPRESSIVE DISORDER (H): ICD-10-CM

## 2020-11-18 ENCOUNTER — COMMUNICATION - HEALTHEAST (OUTPATIENT)
Dept: NURSING | Facility: CLINIC | Age: 80
End: 2020-11-18

## 2020-11-25 ENCOUNTER — COMMUNICATION - HEALTHEAST (OUTPATIENT)
Dept: FAMILY MEDICINE | Facility: CLINIC | Age: 80
End: 2020-11-25

## 2020-11-25 ENCOUNTER — AMBULATORY - HEALTHEAST (OUTPATIENT)
Dept: NURSING | Facility: CLINIC | Age: 80
End: 2020-11-25

## 2020-11-25 ENCOUNTER — COMMUNICATION - HEALTHEAST (OUTPATIENT)
Dept: NURSING | Facility: CLINIC | Age: 80
End: 2020-11-25

## 2020-11-25 DIAGNOSIS — Z01.30 BLOOD PRESSURE CHECK: ICD-10-CM

## 2020-11-25 DIAGNOSIS — G89.29 CHRONIC PAIN OF LEFT KNEE: ICD-10-CM

## 2020-11-25 DIAGNOSIS — M54.50 BILATERAL LOW BACK PAIN WITHOUT SCIATICA, UNSPECIFIED CHRONICITY: ICD-10-CM

## 2020-11-25 DIAGNOSIS — R63.0 LOSS OF APPETITE: ICD-10-CM

## 2020-11-25 DIAGNOSIS — M25.562 CHRONIC PAIN OF LEFT KNEE: ICD-10-CM

## 2020-11-27 RX ORDER — GABAPENTIN 300 MG/1
600 CAPSULE ORAL 2 TIMES DAILY
Qty: 120 CAPSULE | Refills: 2 | Status: SHIPPED | OUTPATIENT
Start: 2020-11-27 | End: 2021-10-06

## 2020-12-01 ENCOUNTER — COMMUNICATION - HEALTHEAST (OUTPATIENT)
Dept: NURSING | Facility: CLINIC | Age: 80
End: 2020-12-01

## 2020-12-02 ENCOUNTER — COMMUNICATION - HEALTHEAST (OUTPATIENT)
Dept: NURSING | Facility: CLINIC | Age: 80
End: 2020-12-02

## 2020-12-04 ENCOUNTER — COMMUNICATION - HEALTHEAST (OUTPATIENT)
Dept: NURSING | Facility: CLINIC | Age: 80
End: 2020-12-04

## 2020-12-16 ENCOUNTER — COMMUNICATION - HEALTHEAST (OUTPATIENT)
Dept: CARE COORDINATION | Facility: CLINIC | Age: 80
End: 2020-12-16

## 2020-12-17 ENCOUNTER — COMMUNICATION - HEALTHEAST (OUTPATIENT)
Dept: NURSING | Facility: CLINIC | Age: 80
End: 2020-12-17

## 2020-12-23 ENCOUNTER — COMMUNICATION - HEALTHEAST (OUTPATIENT)
Dept: NURSING | Facility: CLINIC | Age: 80
End: 2020-12-23

## 2021-01-05 ENCOUNTER — COMMUNICATION - HEALTHEAST (OUTPATIENT)
Dept: FAMILY MEDICINE | Facility: CLINIC | Age: 81
End: 2021-01-05

## 2021-01-05 DIAGNOSIS — G47.00 INSOMNIA, UNSPECIFIED TYPE: ICD-10-CM

## 2021-01-05 RX ORDER — TRAZODONE HYDROCHLORIDE 50 MG/1
50 TABLET, FILM COATED ORAL AT BEDTIME
Qty: 90 TABLET | Refills: 1 | Status: SHIPPED | OUTPATIENT
Start: 2021-01-05 | End: 2021-10-06 | Stop reason: ALTCHOICE

## 2021-01-06 ENCOUNTER — COMMUNICATION - HEALTHEAST (OUTPATIENT)
Dept: NURSING | Facility: CLINIC | Age: 81
End: 2021-01-06

## 2021-01-28 ENCOUNTER — COMMUNICATION - HEALTHEAST (OUTPATIENT)
Dept: CARE COORDINATION | Facility: CLINIC | Age: 81
End: 2021-01-28

## 2021-02-04 ENCOUNTER — COMMUNICATION - HEALTHEAST (OUTPATIENT)
Dept: NURSING | Facility: CLINIC | Age: 81
End: 2021-02-04

## 2021-02-23 ENCOUNTER — COMMUNICATION - HEALTHEAST (OUTPATIENT)
Dept: NURSING | Facility: CLINIC | Age: 81
End: 2021-02-23

## 2021-03-08 ENCOUNTER — COMMUNICATION - HEALTHEAST (OUTPATIENT)
Dept: NURSING | Facility: CLINIC | Age: 81
End: 2021-03-08

## 2021-03-22 ENCOUNTER — COMMUNICATION - HEALTHEAST (OUTPATIENT)
Dept: CARE COORDINATION | Facility: CLINIC | Age: 81
End: 2021-03-22

## 2021-03-24 ENCOUNTER — COMMUNICATION - HEALTHEAST (OUTPATIENT)
Dept: NURSING | Facility: CLINIC | Age: 81
End: 2021-03-24

## 2021-03-24 DIAGNOSIS — R63.0 LOSS OF APPETITE: ICD-10-CM

## 2021-03-24 DIAGNOSIS — M25.562 CHRONIC PAIN OF LEFT KNEE: ICD-10-CM

## 2021-03-24 DIAGNOSIS — R05.9 COUGH: ICD-10-CM

## 2021-03-24 DIAGNOSIS — M54.50 BILATERAL LOW BACK PAIN WITHOUT SCIATICA, UNSPECIFIED CHRONICITY: ICD-10-CM

## 2021-03-24 DIAGNOSIS — G89.29 CHRONIC PAIN OF LEFT KNEE: ICD-10-CM

## 2021-03-26 RX ORDER — BENZONATATE 100 MG/1
100 CAPSULE ORAL EVERY 6 HOURS PRN
Qty: 30 CAPSULE | Refills: 0 | Status: SHIPPED | OUTPATIENT
Start: 2021-03-26 | End: 2021-10-06

## 2021-04-22 ENCOUNTER — OFFICE VISIT - HEALTHEAST (OUTPATIENT)
Dept: FAMILY MEDICINE | Facility: CLINIC | Age: 81
End: 2021-04-22

## 2021-04-22 DIAGNOSIS — M25.562 CHRONIC PAIN OF LEFT KNEE: ICD-10-CM

## 2021-04-22 DIAGNOSIS — R42 DIZZINESS: ICD-10-CM

## 2021-04-22 DIAGNOSIS — L30.9 ECZEMA: ICD-10-CM

## 2021-04-22 DIAGNOSIS — R21 RASH OF NECK: ICD-10-CM

## 2021-04-22 DIAGNOSIS — H04.123 DRY EYES: ICD-10-CM

## 2021-04-22 DIAGNOSIS — G89.29 CHRONIC PAIN OF LEFT KNEE: ICD-10-CM

## 2021-04-22 RX ORDER — CAPSAICIN 0.025 %
CREAM (GRAM) TOPICAL 2 TIMES DAILY
Qty: 60 G | Refills: 1 | Status: SHIPPED | OUTPATIENT
Start: 2021-04-22 | End: 2024-04-01

## 2021-04-22 RX ORDER — CLOTRIMAZOLE AND BETAMETHASONE DIPROPIONATE 10; .64 MG/G; MG/G
CREAM TOPICAL
Qty: 15 G | Refills: 1 | Status: SHIPPED | OUTPATIENT
Start: 2021-04-22 | End: 2021-10-06

## 2021-04-22 RX ORDER — MECLIZINE HYDROCHLORIDE 25 MG/1
25 TABLET ORAL 3 TIMES DAILY PRN
Qty: 45 TABLET | Refills: 1 | Status: SHIPPED | OUTPATIENT
Start: 2021-04-22 | End: 2021-10-06

## 2021-04-22 RX ORDER — AZELASTINE HYDROCHLORIDE 0.5 MG/ML
SOLUTION/ DROPS OPHTHALMIC
Qty: 6 ML | Refills: 12 | Status: SHIPPED | OUTPATIENT
Start: 2021-04-22 | End: 2021-10-06

## 2021-04-22 RX ORDER — TRIAMCINOLONE ACETONIDE 0.25 MG/G
CREAM TOPICAL
Qty: 30 G | Refills: 1 | Status: SHIPPED | OUTPATIENT
Start: 2021-04-22 | End: 2021-10-06

## 2021-04-26 ENCOUNTER — COMMUNICATION - HEALTHEAST (OUTPATIENT)
Dept: NURSING | Facility: CLINIC | Age: 81
End: 2021-04-26

## 2021-04-26 ENCOUNTER — COMMUNICATION - HEALTHEAST (OUTPATIENT)
Dept: CARE COORDINATION | Facility: CLINIC | Age: 81
End: 2021-04-26

## 2021-05-11 ENCOUNTER — COMMUNICATION - HEALTHEAST (OUTPATIENT)
Dept: FAMILY MEDICINE | Facility: CLINIC | Age: 81
End: 2021-05-11

## 2021-05-11 DIAGNOSIS — G89.29 CHRONIC PAIN OF LEFT KNEE: ICD-10-CM

## 2021-05-11 DIAGNOSIS — M25.562 CHRONIC PAIN OF LEFT KNEE: ICD-10-CM

## 2021-05-11 DIAGNOSIS — M54.50 BILATERAL LOW BACK PAIN WITHOUT SCIATICA, UNSPECIFIED CHRONICITY: ICD-10-CM

## 2021-05-11 RX ORDER — ACETAMINOPHEN AND CODEINE PHOSPHATE 300; 30 MG/1; MG/1
1 TABLET ORAL EVERY 12 HOURS PRN
Qty: 45 TABLET | Refills: 0 | Status: SHIPPED | OUTPATIENT
Start: 2021-05-11 | End: 2021-10-06

## 2021-05-26 VITALS — SYSTOLIC BLOOD PRESSURE: 105 MMHG | HEART RATE: 65 BPM | DIASTOLIC BLOOD PRESSURE: 61 MMHG

## 2021-05-26 ASSESSMENT — PATIENT HEALTH QUESTIONNAIRE - PHQ9
SUM OF ALL RESPONSES TO PHQ QUESTIONS 1-9: 8
SUM OF ALL RESPONSES TO PHQ QUESTIONS 1-9: 15

## 2021-05-27 NOTE — TELEPHONE ENCOUNTER
Called patient via  line was not able to get of hold of patient at  [phone] person who answer was able to provider a number we could reach him at [phone]. Spoke with  Patient regarding depression follow-up with . Patient schedule for May 3, 2019.

## 2021-05-28 ASSESSMENT — ANXIETY QUESTIONNAIRES: GAD7 TOTAL SCORE: 5

## 2021-05-28 NOTE — TELEPHONE ENCOUNTER
Central PA team  [phone]  Pool: HE PA MED ([id number])          PA has been initiated.       PA form completed and faxed insurance via Cover My Meds     Key:  [id number]     Medication:  Olopatadine HCl 0.2% OP SOLN    Insurance: [employer] [facility] ([facility]) [facility] Medicaid         Response will be received via fax and may take up to 5-10 business days depending on plan

## 2021-05-28 NOTE — PROGRESS NOTES
"ASSESMENT AND PLAN:  Diagnoses and all orders for this visit:    Syncope, unspecified syncope type  -     Comprehensive Metabolic Panel  -     HM1(CBC and Differential)  -     CT Head Without Contrast; Future; Expected date: 05/16/2019  -     HM1 (CBC with Diff)  -     US Carotid Bilateral; Future; Expected date: 05/16/2019  Work-up as above.  Follow-up in a few weeks.  Discussed indications to call or to go to the ED.  Patient and PCA voiced understanding.    Left knee pain, unspecified chronicity  Advised to take Tylenol 1 tablet every 4 hours as needed. (He is taking 2 tablets once a day now )    Dizziness  -     meclizine (ANTIVERT) 25 mg tablet; Take 1 tablet (25 mg total) by mouth 3 (three) times a day as needed for dizziness or nausea.  Dispense: 30 tablet; Refill: 1    Depression, unspecified depression type  Restarted Prozac 2 weeks ago.  Denied suicidal or homicidal ideations.    Insomnia, unspecified type  -     doxylamine (UNISOM) 25 mg tablet; Take 1 tablet (25 mg total) by mouth at bedtime as needed for sleep.  Dispense: 30 tablet; Refill: 2    This transcription uses voice recognition software, which may contain typographical errors.        SUBJECTIVE: Teodoro Gregorio is an 79-year-old male with history of depression, hearing loss and ongoing back and knee pain here with with a complaint of dizziness.  His PCA and professional .  The dizziness is not new but appears to be worsening in the past few days.  He moved in with new PCA family few months ago.  PCA states that the patient\" passed out\" twice in the past 2 weeks.  He was complaining of dizziness, went to bed and passed out for 1 to 2 minutes per PCA.  He denied acute dizziness today.  No nausea associated with the dizziness.  Patient states he does not remember if he passed out or not.  His appetite has been very poor but he has been trying to drink fluids.  No nausea or vomiting.  No focal weakness is reported.    His main concerns are " "insomnia and knee pain.  He asked if he can be prescribed\" burmeton\" to help him sleep.  Reports he has been thinking more about his wife.  They see each other 3 times a week at .  The wife is living with his daughter.  He cries at the time but no reason for PCA.  Patient denies suicidal homicidal ideations.    Past Medical History:   Diagnosis Date     Constipation      Hearing loss      Insomnia      Multiple joint pain      TB lung, latent      Patient Active Problem List   Diagnosis     Insomnia     Multiple joint pain     Hearing loss     Soft tissue mass     SNHL (sensorineural hearing loss)     TB lung, latent     Incontinence     Back pain     Memory difficulty     Mild single current episode of major depressive disorder (H)     Loose, teeth     Loss of appetite       Allergies:  No Known Allergies    Social History     Tobacco Use   Smoking Status Former Smoker   Smokeless Tobacco Current User     Types: Chew   Tobacco Comment    betel nut       Review of systems otherwise negative except as listed in HPI.   Social History     Tobacco Use   Smoking Status Former Smoker   Smokeless Tobacco Current User     Types: Chew   Tobacco Comment    betel nut       OBJECTICE: /80 (Patient Site: Left Arm, Patient Position: Sitting, Cuff Size: Adult Regular)   Pulse 70   Temp 97.3  F (36.3  C) (Oral)   Ht 5' 1\" (1.549 m)   Wt 119 lb (54 kg)   SpO2 90%   BMI 22.48 kg/m      DATA REVIEWED:    Radiology Tests Summarized or Ordered (1):   Labs Reviewed or Ordered (1):       GEN-alert, oriented to time, place and person.  HEENT-mucous membranes are moist, clear tympanic membranes bilaterally.  CV-regular rate and rhythm with no murmur.   RESP-lungs clear to auscultation .  ABDOMEN- Soft , not tender.  NEURO- No focal findings.   SKIN-normal        Tuckasegee Jossie   5/16/2019   "

## 2021-05-28 NOTE — TELEPHONE ENCOUNTER
Fax received from [facility] pharmacy requesting Prior Authorization    Medication Name:   olopatadine 0.2 % Drop 1 Bottle 3 5/3/2019     Sig: Apply 1 drop in eyes once a day          Insurance Plan: MOHAN   PBM:    Patient ID Number: [id number]    Please start PA process

## 2021-05-28 NOTE — PROGRESS NOTES
ASSESMENT AND PLAN:  Diagnoses and all orders for this visit:    1. Mild single current episode of major depressive disorder (H)  Restarted medication.  To follow-up in 2 months.  - FLUoxetine (PROZAC) 20 MG capsule; Take 1 capsule (20 mg total) by mouth daily.  Dispense: 30 capsule; Refill: 5    2. Back pain  Will consider physical therapy.  - acetaminophen (TYLENOL) 325 MG tablet; Take 1 tablet (325 mg total) by mouth every 4 (four) hours as needed for pain.  Dispense: 90 tablet; Refill: 5    3. Loss of appetite  - multivitamin (ONE A DAY) per tablet; Take 1 tablet by mouth daily.  Dispense: 120 tablet; Refill: 2  Wt Readings from Last 3 Encounters:   05/03/19 120 lb (54.4 kg)   10/24/18 122 lb (55.3 kg)   07/20/18 125 lb 5 oz (56.8 kg)     4. Chronic pain of both knees  Tylenol as above.    5. Dry eyes  - olopatadine 0.2 % Drop; Apply 1 drop in eyes once a day  Dispense: 1 Bottle; Refill: 3    6. Teeth missing  Offered dental referral but patient declined.    This transcription uses voice recognition software, which may contain typographical errors.      SUBJECTIVE: Teodoro Gregorio is 79-year-old male with history of depression, hearing loss, back pain, memory problems, multiple joint pain here for follow-up.  He ran out of all his medications for more than 2 months now.  He moved in with an different family 2 months ago.  States he is happier now.  PCA reports seeing him crying for no reason once or twice a week.  His appetite is the same, he eats only small amounts few times a day.  He denied visual or auditory hallucinations but see PCA states he sees things that are not there.  He denied suicidal or homicidal ideations.  He does not like hearing aid.    He is complaining of bilateral knee pain and back pain.  No new injury to the back or knees.  He has these pains for several years now.  He is not taking any medication for it currently.    He is complaining of itchy watery eyes for few months.  No discharge from  "the eyes.  No worsening vision.  No double visions of floaters.    Past Medical History:   Diagnosis Date     Constipation      Hearing loss      Insomnia      Multiple joint pain      TB lung, latent      Patient Active Problem List   Diagnosis     Insomnia     Multiple joint pain     Hearing loss     Soft tissue mass     SNHL (sensorineural hearing loss)     TB lung, latent     Incontinence     Back pain     Memory difficulty     Mild single current episode of major depressive disorder (H)     Loose, teeth     Loss of appetite       Allergies:  No Known Allergies    Social History     Tobacco Use   Smoking Status Former Smoker   Smokeless Tobacco Current User     Types: Chew   Tobacco Comment    betel nut       Review of systems otherwise negative except as listed in HPI.   Social History     Tobacco Use   Smoking Status Former Smoker   Smokeless Tobacco Current User     Types: Chew   Tobacco Comment    betel nut       OBJECTICE: /72   Pulse 64   Temp 97.6  F (36.4  C) (Oral)   Resp 18   Ht 5' 1\" (1.549 m)   Wt 120 lb (54.4 kg)   SpO2 99%   BMI 22.67 kg/m            GEN-alert,  in no apparent distress.  HEENT-mucous membranes are moist, neck is supple.Missing teeth.  CV-regular rate and rhythm with no murmur.   RESP-lungs clear to auscultation .  ABDOMEN- Soft , not tender.  EXTREM- KNEES-no significant swelling.  Global tenderness around the knee joint.  No effusion or erythema.  SKIN-tattoo covered both thighs        Juan Sethi   5/3/2019   " (1) Oriented to own ability

## 2021-05-29 NOTE — PROGRESS NOTES
Interp: Saw [id number]    CHW discussed enrolling in CCC with Lev Green, the [facility] and they report they are interested in scheduling a CCC assessment to determine if enrollment into CCC is appropriate.    CCC RN Assessment scheduled for Tuesday 7/2 at 9am.      Next Outreach: TBD once assessment has been completed

## 2021-05-29 NOTE — PROGRESS NOTES
ASSESMENT AND PLAN:  Diagnoses and all orders for this visit:    Mild single current episode of major depressive disorder (H)  No worsening symptoms.  No suicidal or homicidal ideations.  Continue Prozac.    Bilateral low back pain without sciatica, unspecified chronicity  Increase Neurontin to 300 mg 2 tablets twice a day.    Sensorineural hearing loss (SNHL) of both ears  His hearing aid but does not want to wear it.    Memory difficulty  -     Ambulatory referral to Care Management (Primary Care)    Insomnia, unspecified type  -     traZODone (DESYREL) 50 MG tablet; Take 1 tablet (50 mg total) by mouth at bedtime.  Dispense: 90 tablet; Refill: 1  Unisom not covered by insurance.  Advised not to watch to movie at night.    He requests to call his PCA for future communications, he will sign MANJU today.    This transcription uses voice recognition software, which may contain typographical errors.      SUBJECTIVE: Teodoro Gregorio is an 79-year-old male with history of memory problems, hearing loss, depression, back pain, neck pain and insomnia here to follow-up on syncope.  Head CT was negative.  Carotid ultrasound was unremarkable.  CMP and CBC are within normal range.  He is here with his PCA.  PCA states he is doing better now.  He is complaining of occasional dizziness but no syncope since last visit.  Appetite is improving slowly.  He is still complaining of trouble sleeping, requesting medication.  Unisom was not covered by his insurance.  He is still complaining of neck pain and back pain, not worsening.  He takes Neurontin 300 mg 1 tablet twice a day and Tylenol 1 tablet twice a day.  No acute injury to the back or neck.     Past Medical History:   Diagnosis Date     Constipation      Hearing loss      Insomnia      Multiple joint pain      TB lung, latent      Patient Active Problem List   Diagnosis     Insomnia     Multiple joint pain     Hearing loss     Soft tissue mass     SNHL (sensorineural hearing loss)      "TB lung, latent     Incontinence     Back pain     Memory difficulty     Mild single current episode of major depressive disorder (H)     Loose, teeth     Loss of appetite       Allergies:  No Known Allergies    Social History     Tobacco Use   Smoking Status Former Smoker   Smokeless Tobacco Current User     Types: Chew   Tobacco Comment    betel nut       Review of systems otherwise negative except as listed in HPI.   Social History     Tobacco Use   Smoking Status Former Smoker   Smokeless Tobacco Current User     Types: Chew   Tobacco Comment    betel nut       OBJECTICE: /68 (Patient Site: Left Arm, Patient Position: Sitting, Cuff Size: Adult Regular)   Pulse 66   Temp 97.9  F (36.6  C) (Oral)   Ht 5' 1\" (1.549 m)   Wt 121 lb 6 oz (55.1 kg)   SpO2 92%   BMI 22.93 kg/m      DATA REVIEWED:    Radiology Tests Summarized or Ordered (1):   Labs Reviewed or Ordered (1):       GEN-alert,  in no apparent distress.  NECK-mild limited range of motion due to pain.  No significant deformities.  MOUTH- Missing teeth.  CV-regular rate and rhythm with no murmur.   RESP-lungs clear to auscultation .  ABDOMEN- Soft , not tender.  BACK- Mild lower lumbar paraspinal tenderness.  Negative straight leg raising test.  SKIN-normal        Juan Sethi   6/14/2019   "

## 2021-05-30 VITALS — BODY MASS INDEX: 21.16 KG/M2 | WEIGHT: 112 LBS

## 2021-05-30 VITALS — WEIGHT: 111 LBS | HEIGHT: 61 IN | BODY MASS INDEX: 20.96 KG/M2

## 2021-05-30 VITALS — HEIGHT: 61 IN | BODY MASS INDEX: 20.97 KG/M2 | WEIGHT: 111.06 LBS

## 2021-05-30 NOTE — TELEPHONE ENCOUNTER
Called and spoke with patient's PCA, who does have consent to communicate on file, via phone  ID number [id number]. Provided test results and message from Dr. Montes as below. PCA did not have any questions and did confirm that they want the referrals to both ortho and spine. Will communicate to Dr. Montes so that she can place referral orders.   Karen Daniels

## 2021-05-30 NOTE — PROGRESS NOTES
[facility] EXAM NOTE      Chief Complaint   Patient presents with     Knee Pain     Left knee pain worsening       Due to language barrier, an  was present during the history-taking and subsequent discussion (and for part of the physical exam) with this patient.        ASSESSMENT & PLAN    Teodoro was seen today for knee pain.    Diagnoses and all orders for this visit:    Chronic pain of left knee: Discussed my concerns for patellar crepitus and pain, swelling at the ligamament. We had discussed possible steroid injection but that if has patellar arthritis/chondromalacia unlikely to be helpful. Will obtain xray today to further assess. Consider ortho referral depending on results. They are asking for something stronger for pain. Discussed being careful with any opioid medications given age and fall risk. Will start on tylenol #3 to see if this helps at all. Continue gabapentin.   -     XR Knee Bilateral Plus Sunrise VW; Future  -     acetaminophen-codeine (TYLENOL-CODEINE #3) 300-30 mg per tablet; Take 1 tablet by mouth every 4 (four) hours as needed for pain.    Bilateral low back pain without sciatica, unspecified chronicity:  Ongoing for a long time. Previously declined PT and xray but agreeable to xray today. No significant findings on exam today but given chronicity and age I think it is warranted. Continue gabapentin as above and start tylenol 3s  -     XR Lumbar Spine 2 or 3 VWS; Future  -     acetaminophen-codeine (TYLENOL-CODEINE #3) 300-30 mg per tablet; Take 1 tablet by mouth every 4 (four) hours as needed for pain.    Dizziness:stable.   -     meclizine (ANTIVERT) 25 mg tablet; Take 1 tablet (25 mg total) by mouth 3 (three) times a day as needed for dizziness or nausea.    Encounter for completion of form with patient: will fill out to the best of my abilities. First time meeting patient.         HISTORY    Teodoro Gregorio is a 79 y.o.  male who presents for the following issues:    Chronic  Knee Pain: No injury. Left knee is worse.   Low back pain and stiff neck. Most concerned about left knee pain.   Sharp pain. Points to above/top of patella.   If sits on the floor has to crawl to get up.   For awhile. Aching. Constant. Difficulties going up and down stairs.   No swelling. + stiffness. + Numbness.   No imaging.   8/10  Feels a cracking noise    Low back pain next concern: radiation up his back. No radiation down legs. No numbness, tingling. No loss of bladder or bowel.  No paresthesias. Also a chronic ongoing issue. They believe he had imaging in 2016 but I don't see any on his file. He has been taking tylenol and gabapentin and they are asking for something stronger for all his pain. Looks like he has declined xray and PT in the past.     Asking for refill of meclizine.     Asking for me to fill out form for Adult day care center.       MEDICATIONS    Current Outpatient Medications on File Prior to Visit   Medication Sig Dispense Refill     acetaminophen (TYLENOL) 325 MG tablet Take 1 tablet (325 mg total) by mouth every 4 (four) hours as needed for pain. 90 tablet 5     azelastine (OPTIVAR) 0.05 % ophthalmic solution Use one drop in eye daily 6 mL 12     FLUoxetine (PROZAC) 20 MG capsule Take 1 capsule (20 mg total) by mouth daily. 30 capsule 5     gabapentin (NEURONTIN) 300 MG capsule Take 2 capsules (600 mg total) by mouth 2 (two) times a day. 120 capsule 2     multivitamin (ONE A DAY) per tablet Take 1 tablet by mouth daily. 120 tablet 2     olopatadine 0.2 % Drop Apply 1 drop in eyes once a day 1 Bottle 3     traZODone (DESYREL) 50 MG tablet Take 1 tablet (50 mg total) by mouth at bedtime. 90 tablet 1     [DISCONTINUED] meclizine (ANTIVERT) 25 mg tablet Take 1 tablet (25 mg total) by mouth 3 (three) times a day as needed for dizziness or nausea. 30 tablet 1     polyethylene glycol (GLYCOLAX) 17 gram/dose powder   0     triamcinolone (KENALOG) 0.025 % cream Apply thin layer to affected area  "twice a day 30 g 1     No current facility-administered medications on file prior to visit.        Pertinent past medical, surgical, social and family history reviewed and updated in Epic.    Social History     Socioeconomic History     Marital status:      Spouse name: Not on file     Number of children: Not on file     Years of education: Not on file     Highest education level: Not on file   Occupational History     Not on file   Social Needs     Financial resource strain: Not on file     Food insecurity:     Worry: Not on file     Inability: Not on file     Transportation needs:     Medical: Not on file     Non-medical: Not on file   Tobacco Use     Smoking status: Former Smoker     Smokeless tobacco: Current User     Types: Chew     Tobacco comment: betel nut   Substance and Sexual Activity     Alcohol use: No     Drug use: No     Sexual activity: Never   Lifestyle     Physical activity:     Days per week: Not on file     Minutes per session: Not on file     Stress: Not on file   Relationships     Social connections:     Talks on phone: Not on file     Gets together: Not on file     Attends [employer] service: Not on file     Active member of club or organization: Not on file     Attends meetings of clubs or organizations: Not on file     Relationship status: Not on file     Intimate partner violence:     Fear of current or ex partner: Not on file     Emotionally abused: Not on file     Physically abused: Not on file     Forced sexual activity: Not on file   Other Topics Concern     Not on file   Social History Narrative     Not on file         REVIEW OF SYSTEMS     Pertinent Positives and negatives in HPI.     PHYSICAL EXAM    /68 (Patient Site: Left Arm, Patient Position: Sitting, Cuff Size: Adult Regular)   Pulse 70   Ht 5' 1\" (1.549 m)   Wt 123 lb (55.8 kg)   BMI 23.24 kg/m    GEN:  79 y.o. male sitting comfortably in no apparent distress.   HEENT: EOMI, no scleral icterus, buccal mucosa " moist  CHEST/LUNG: No respiratory distress  Back exam  Back:  Normal alignment. No midline tenderness or step offs. Hypertonicity of lumbar paraspinal musculature bilaterally with mild tenderness to palpation of left lower back near L5. Negative straight leg raise. Patellar and achilles reflexes 2/4 bilaterally with intact strength and sensation. Gait is slow. Slow to get on exam table. Hunches over a little.   Knee exam  Knees:  2/4 patellar and achilles reflexes bilaterally. Strength 5/5 hip flexion, knee extension and flexion. Negative lachman's, anterior/posterior drawer testing bilaterally. Negative Mcmurrays. No laxity with varus and valgus testing bilaterally. Tenderness to palpation of anterior superior patella. Some swelling noted superior patella. No joint line tenderness. Crepitus noted with patellar grind test  Sensation grossly intact.  Gait: as above      At the end of the encounter, I discussed results, diagnosis, medications. Discussed red flags for immediate return to clinic/ER, as well as indications for follow up if no improvement. Patient and/or caregiver understood and agreed to plan. Patient was stable for discharge.      Merary Montes

## 2021-05-30 NOTE — PROGRESS NOTES
Interp: Tera [id number]    The Clinic Community Health Worker Khloe the patient today at the request of the PCP to discuss possible Clinic Care Coordination enrollment.  The service was described to the patient and immediate needs were discussed.  The patient agreed to enrollment and an assessment was scheduled.  The patient was provided with contact information for the clinic CHW.           Assessment date: Tuesday 8/13/19 at 11am

## 2021-05-30 NOTE — PROGRESS NOTES
Interp: Naw [id number]    Teodoro Gregorio is at adult  at this time and CHW spoke with the PCA Lev Green regarding the cancelled CCC RN Assessment.    Lev Green reports that Teodoro Gregorio was not feeling well and that is why he cancelled his CCC RN Assessment. He reports he would like to speak with the Teodoro Jg and call the CHW back to reschedule the CCC RN Assessment.      CCC referral will remain in the WQ.

## 2021-05-30 NOTE — PROGRESS NOTES
Interp: Tera [id number]    Attempt 2: CHW left message regarding if patient is still interested in a CCC RN Assessment for enrollment into CCC. If patient is returning my call please transfer to ext [phone].

## 2021-05-30 NOTE — PROGRESS NOTES
Interp: Luciano [id number]    CHW followed up on cancelled CCC RN Assessment, this is the second time the RN Assessment has been cancelled.    Patient changing clinics and therefor does not want to reschedule cancelled RN Assessment.    Removing CCC referral from WQ.

## 2021-05-30 NOTE — TELEPHONE ENCOUNTER
FYI - Status Update  Who is Calling: Patient  Update: Patient cancelled appointment on 8/13/19.  Okay to leave a detailed message?:  No

## 2021-05-30 NOTE — TELEPHONE ENCOUNTER
----- Message from Merary Montes DO sent at 7/20/2019  8:44 AM CDT -----  Please call patient and inform:  Knee actually looks okay. Some age related osteoarthritic changes. Could try steroid injection or referral to ortho. Back has many chronic age related sources of pain. Would he like a referral to the spine clinic?

## 2021-05-31 VITALS — BODY MASS INDEX: 21.37 KG/M2 | WEIGHT: 113.19 LBS | HEIGHT: 61 IN

## 2021-05-31 VITALS — BODY MASS INDEX: 22.29 KG/M2 | WEIGHT: 118.06 LBS | HEIGHT: 61 IN

## 2021-05-31 NOTE — PROGRESS NOTES
[facility] Daily Progress     Patient Name: Teodoro Gregorio  Date: 9/3/2019  Visit #: 4  PTA visit #:    PRECAUTIONS: falls risk  Referral Diagnosis:   M47.816 (ICD-10-CM) - Lumbar facet arthropathy   M51.36 (ICD-10-CM) - DDD (degenerative disc disease), lumbar   M54.5, G89.29 (ICD-10-CM) - Chronic bilateral low back pain without sciatica   M25.562, G89.29 (ICD-10-CM) - Chronic pain of left knee   R20.0, R20.2 (ICD-10-CM) - Numbness and tingling of left lower extremity      Referring provider: Mihaela Deras C*  Visit Diagnosis:     ICD-10-CM    1. Chronic bilateral low back pain with left-sided sciatica M54.42     G89.29    2. Generalized muscle weakness M62.81    3. Unsteadiness on feet R26.81          Assessment:     Based on falls assessment, patient is at an increased risk for falling. Plan of care will address this risk with lower extremity strengthening and balance training.  Signs/sx consistent with chronic LBP with mobility and stability deficits, likely due to OA/degnerative changes, with concurrent L LE paresthesias, likely due to lumbar foraminal stenosis.  HEP/POC compliance is  good .  Patient demonstrates understanding/independence with home program.  Patient is appropriate to continue with skilled physical therapy intervention, as indicated by initial plan of care.    Goal Status:  Pt. will be independent with home exercise program in : 6 weeks  Pt. will have improved quality of sleep: waking less times/night;in 6 weeks  Pt. will be able to walk : 10 minutes;for community mobility;for household mobility;with less pain;with less difficulty;in 6 weeks  Patient will transfer: sit/stand;supine/sit;for in/out of bed;for in/out of chair;with less pain;with less difficulty;in 6 weeks    Patient will decrease : DOMINIC score;for improved quality of life;in 6 weeks;by _ points  by ___ points: >= 30% from IE      Plan / Patient Education:     Continue per POC, progressing core and LE strength as  "tolerated.  Trial standing LE strengthening next visit.  Continue gait training with 4WW PRN.    Subjective:     Pain Rating: \"No change\" low back pain  Doing HEP 1x/day with help from daughter.    Objective:     Orders signed from physician for home 4WW. Info provided for [facility]. Reviewed and performed gait training with demonstration provided for 4WW for proper height setting, ambulation, turning, hand placement for sit<>stands, proper use of seat on walker, and curb negotiation. Requires CGA for descending curb, min A for ascending curb.    Poor TAC continues to be present, but improved with partial crunch.    Treatment Today     TREATMENT MINUTES COMMENTS   Evaluation     Self-care/ Home management     Manual therapy     Neuromuscular Re-education     Therapeutic Activity     Therapeutic Exercises 13 Ex per flow sheet   Gait training 16 Per above   Modality__________________                Total 29    Blank areas are intentional and mean the treatment did not include these items.       Roosevelt Vail, PT, DPT  9/3/2019    "

## 2021-05-31 NOTE — TELEPHONE ENCOUNTER
Pt's BEN, Eh Eh Peter, returned call. Consent to communicate on file. Brought  #[id number] on the line. Gave provider's results and recommendations. She verbalized understanding.

## 2021-05-31 NOTE — PROGRESS NOTES
[facility]   Initial Evaluation    Patient Name: Teodoro Gregorio  Date of evaluation: 8/8/2019  PRECAUTIONS: falls risk  Referral Diagnosis:   M47.816 (ICD-10-CM) - Lumbar facet arthropathy   M51.36 (ICD-10-CM) - DDD (degenerative disc disease), lumbar   M54.5, G89.29 (ICD-10-CM) - Chronic bilateral low back pain without sciatica   M25.562, G89.29 (ICD-10-CM) - Chronic pain of left knee   R20.0, R20.2 (ICD-10-CM) - Numbness and tingling of left lower extremity     Referring provider: Mihaela Deras C*  Visit Diagnosis:     ICD-10-CM    1. Chronic bilateral low back pain with left-sided sciatica M54.42     G89.29    2. Generalized muscle weakness M62.81    3. Unsteadiness on feet R26.81        Assessment:      Pt. is appropriate for skilled PT intervention as outlined in the Plan of Care (POC).  Pt. is a good candidate for skilled PT services to improve pain levels and function.  Based on falls assessment, patient is at an increased risk for falling. Plan of care will address this risk with lower extremity strengthening and balance training.  Goals and POC established in collaboration with the patient.  Signs/sx consistent with chronic LBP with mobility and stability deficits, likely due to OA/degnerative changes, with concurrent L LE paresthesias, likely due to lumbar foraminal stenosis.    Goals:  Pt. will be independent with home exercise program in : 6 weeks  Pt. will have improved quality of sleep: waking less times/night;in 6 weeks  Pt. will be able to walk : 10 minutes;for community mobility;for household mobility;with less pain;with less difficulty;in 6 weeks  Patient will transfer: sit/stand;supine/sit;for in/out of bed;for in/out of chair;with less pain;with less difficulty;in 6 weeks    Patient will decrease : DOMINIC score;for improved quality of life;in 6 weeks;by _ points  by ___ points: >= 30% from IE      Patient's expectations/goals are moderately realistic    Barriers to Learning or  "Achieving Goals:  Language barriers.   [place], memory difficulties  Age       Plan / Patient Instructions:        Plan of Care:   Communication with: Referral Source;Patient Caregiver  Patient Related Instruction: Nature of Condition;Treatment plan and rationale;Self Care instruction;Basis of treatment;Body mechanics;Posture;Precautions;Next steps;Expected outcome  Times per Week: 2  Number of Weeks: 6  Number of Visits: 12  Therapeutic Exercise: ROM;Stretching;Strengthening  Neuromuscular Reeducation: posture;balance/proprioception;core  Manual Therapy: soft tissue mobilization;joint mobilization  Modalities: traction (PRN)      Plan for next visit: Review HEP, adding clamshells, sit<>stands, and HS stretch/sciatic nerve glides to HEP. Provide handout for bed mobility.     Subjective:       History of Present Illness:    Teodoro is a 79 y.o. male who presents to therapy today with complaints of chronic LBP with L>R LE weakness and L LE paresthesias within the L5 distribution.   [facility]: \"Teodoro Gregorio  Is a 79 y.o. male who presents today for new patient evaluation of low back pain that is chronic across the lumbosacral junction ongoing for many decades however progressively worsening per patient.  His pain is constant at the low back at a 5/10 does get up to an 8/10 at its worst most bothersome with walking where he feels bending slightly forward feels better.  He does feel weaker in his lower extremity left greater than right however denies any lower extremity pain.  Patient does endorse numbness and tingling sensations however into the left lateral thigh and lateral shin also ongoing.  He does have ongoing left knee pain localized but feels that that is unrelated to his spine as it does not correlate to when his back pain is bothersome.  Patient denies any recent trips or falls but he does have a history of falling out of a russ tree when he was younger.  Patient denies bowel or bladder " "loss control.\"  Onset: Chronic for many decades  Duration: Constant  Worse with most activity, but especially standing, walking, sit<>stand and supine<>sit transfers.  Better with pain medication  Pain Medication: Please refer to EMR  Sleep: Reports waking 2x/night on average due to pain.    Pt seeks PT to \"be able to walk without pain.\"    Pain Ratin  Pain rating at best: 3  Pain rating at worst: 10  Pain description: aching, dull, pain and soreness     Objective:      Patient Outcome Measures :    Modified Oswestry Low Back Pain Disablity Questionnaire  in %: 58     Scores range from 0-100%, where a score of 0% represents minimal pain and maximal function. The minimal clinically important difference is a score reduction of 12%.     PER EMR:  EXAM: XR LUMBAR SPINE 2 OR 3 VWS  LOCATION: [facility]  DATE/TIME: 2019 3:21 PM     INDICATION: Low back pain  COMPARISON: None.     FINDINGS: 5 lumbar vertebral bodies. Mild presumably chronic L1-L3 compressions. Mild thoracolumbar kyphosis. 20 degree dextroscoliosis apex L1-L2. Advanced L4-L5 and L5-S1 interbody degenerative change with moderate L5-S1 facet arthropathy.    Examination  1. Chronic bilateral low back pain with left-sided sciatica     2. Generalized muscle weakness     3. Unsteadiness on feet       Involved side: left>right  Posture Observation:      General sitting posture is  poor.  General standing posture is poor.    Gait is poor, moderately antalgic due to pain and weakness. Uses SEC in R UE, but incorrectly fitted for height.  Able to reciprocally ascend/descend stairs with single HR and SEC, but with increased pain.    Lumbar ROM: All % of WNL. Not tested, due to poor balance and difficulty following directions today.  Date:      *Indicate scale AROM AROM AROM   Lumbar Flexion      Lumbar Extension       Right Left Right Left Right Left   Lumbar Sidebending         Lumbar Rotation         Thoracic Flexion      Thoracic Extension    "   Thoracic Sidebending         Thoracic Rotation           Slump Test: Negative bilat  SLR: 50* bilat.    Lower Extremity Strength:  Date:      LE strength/5 Right Left Right Left Right Left   Hip Flexion (L1-3) 3+ 3+       Hip Extension (L5-S1) 3+ 3+       Hip Abduction (L4-5)         Hip Adduction (L2-3)         Hip External Rotation         Hip Internal Rotation         Knee Extension (L3-4) 4- 4-       Knee Flexion 4 4-       Ankle Dorsiflexion (L4-5) 4 4       Great Toe Extension (L5)         Ankle Plantar flexion (S1) 3+ 3+       Abdominals          Treatment Today     TREATMENT MINUTES COMMENTS   Evaluation 15    Self-care/ Home management 10 Pt education provided on proper log roll technique for supine<>sit transfers with demonstration and performance with heavy verbal, tactile cueing.   Manual therapy     Neuromuscular Re-education     Therapeutic Activity     Therapeutic Exercises 27 -Discussed therapy diagnosis, prognosis, POC, relevant anatomy and basis for tx.  -Reviewed and performed HEP with handouts provided.   Gait training     Modality__________________                Total 52    Blank areas are intentional and mean the treatment did not include these items.            PT Evaluation Code: (Please list factors)  Patient History/Comorbidities: See above  Examination: Chronic LBP, weakness  Clinical Presentation: Stable  Clinical Decision Making: Low    Patient History/  Comorbidities Examination  (body structures and functions, activity limitations, and/or participation restrictions) Clinical Presentation Clinical Decision Making (Complexity)   No documented Comorbidities or personal factors 1-2 Elements Stable and/or uncomplicated Low   1-2 documented comorbidities or personal factor 3 Elements Evolving clinical presentation with changing characteristics Moderate   3-4 documented comorbidities or personal factors 4 or more Unstable and unpredictable High           Roosevelt Vail, PT,  THOMAS  8/8/2019  12:59 PM

## 2021-05-31 NOTE — TELEPHONE ENCOUNTER
Call to pt with provider's results and recommendations with assistance of Anamika  #[phone]. Left message to return call.

## 2021-05-31 NOTE — PROGRESS NOTES
[facility] Daily Progress     Patient Name: Teodoro Gregorio  Date: 8/27/2019  Visit #: 2  PTA visit #:    PRECAUTIONS: falls risk  Referral Diagnosis:   M47.816 (ICD-10-CM) - Lumbar facet arthropathy   M51.36 (ICD-10-CM) - DDD (degenerative disc disease), lumbar   M54.5, G89.29 (ICD-10-CM) - Chronic bilateral low back pain without sciatica   M25.562, G89.29 (ICD-10-CM) - Chronic pain of left knee   R20.0, R20.2 (ICD-10-CM) - Numbness and tingling of left lower extremity      Referring provider: Mihaela Deras C*  Visit Diagnosis:     ICD-10-CM    1. Chronic bilateral low back pain with left-sided sciatica M54.42     G89.29    2. Generalized muscle weakness M62.81    3. Unsteadiness on feet R26.81          Assessment:     Based on falls assessment, patient is at an increased risk for falling. Plan of care will address this risk with lower extremity strengthening and balance training.  Signs/sx consistent with chronic LBP with mobility and stability deficits, likely due to OA/degnerative changes, with concurrent L LE paresthesias, likely due to lumbar foraminal stenosis.  HEP/POC compliance is  good .  Patient is appropriate to continue with skilled physical therapy intervention, as indicated by initial plan of care.    Goal Status:  Pt. will be independent with home exercise program in : 6 weeks  Pt. will have improved quality of sleep: waking less times/night;in 6 weeks  Pt. will be able to walk : 10 minutes;for community mobility;for household mobility;with less pain;with less difficulty;in 6 weeks  Patient will transfer: sit/stand;supine/sit;for in/out of bed;for in/out of chair;with less pain;with less difficulty;in 6 weeks    Patient will decrease : DOMINIC score;for improved quality of life;in 6 weeks;by _ points  by ___ points: >= 30% from IE      Plan / Patient Education:     Continue per POC, progressing core and LE strength as tolerated.  Continue gait training with 4WW, with consideration for  "obtainment for home.    Subjective:     Pain Rating: \"Mild\" low back pain  Overall, reports no change in LBP yet.  Doing HEP 1x/day with help from daughter.    Objective:     Reviewed HEP with significant verbal, tactile cueing, adding sit<>Stands and supine HS stretch with ankle PF/DF for sciatic nerve gliding.  Gait continues to be highly unsteady and antalgic. Trialed ambulation with 4WW, although requires consistent cueing for safe and correct use. Reports decreased LBP with use of 4WW compared to SEC.    Treatment Today     TREATMENT MINUTES COMMENTS   Evaluation     Self-care/ Home management     Manual therapy     Neuromuscular Re-education     Therapeutic Activity     Therapeutic Exercises 23 Ex per flow sheet   Gait training 8 Per above   Modality__________________                Total 31    Blank areas are intentional and mean the treatment did not include these items.       Roosevelt Vail, PT, DPT  8/27/2019    "

## 2021-05-31 NOTE — PROGRESS NOTES
[facility] Daily Progress     Patient Name: Teodoro Gregorio  Date: 8/29/2019  Visit #: 3  PTA visit #:    PRECAUTIONS: falls risk  Referral Diagnosis:   M47.816 (ICD-10-CM) - Lumbar facet arthropathy   M51.36 (ICD-10-CM) - DDD (degenerative disc disease), lumbar   M54.5, G89.29 (ICD-10-CM) - Chronic bilateral low back pain without sciatica   M25.562, G89.29 (ICD-10-CM) - Chronic pain of left knee   R20.0, R20.2 (ICD-10-CM) - Numbness and tingling of left lower extremity      Referring provider: Mihaela Deras C*  Visit Diagnosis:     ICD-10-CM    1. Chronic bilateral low back pain with left-sided sciatica M54.42     G89.29    2. Generalized muscle weakness M62.81    3. Unsteadiness on feet R26.81          Assessment:     Based on falls assessment, patient is at an increased risk for falling. Plan of care will address this risk with lower extremity strengthening and balance training.  Signs/sx consistent with chronic LBP with mobility and stability deficits, likely due to OA/degnerative changes, with concurrent L LE paresthesias, likely due to lumbar foraminal stenosis.  HEP/POC compliance is  good .  Patient is appropriate to continue with skilled physical therapy intervention, as indicated by initial plan of care.    Goal Status:  Pt. will be independent with home exercise program in : 6 weeks  Pt. will have improved quality of sleep: waking less times/night;in 6 weeks  Pt. will be able to walk : 10 minutes;for community mobility;for household mobility;with less pain;with less difficulty;in 6 weeks  Patient will transfer: sit/stand;supine/sit;for in/out of bed;for in/out of chair;with less pain;with less difficulty;in 6 weeks    Patient will decrease : DOMINIC score;for improved quality of life;in 6 weeks;by _ points  by ___ points: >= 30% from IE      Plan / Patient Education:     Continue per POC, progressing core and LE strength as tolerated.  Continue gait training with 4WW. Pending physician approval,  "PT will contact patient once orders signed for them to receive the walker.    Subjective:     Pain Rating: \"Tolerable\" low back pain  Doing HEP 1x/day with help from daughter.  Daughter reports patient continued to talk of how greater he thought trying the 4WW was last time in PT. He was pleased how much better his back felt with walking.    Objective:     With cues for upright posture, patient demonstrating significantly improved gait pattern with use of 4WW compared to the SEC, as well as significantly improved from previous visit. Reviewed with patient proper hand placement and sequencing for sit<>stands as well. Orders sent for MD signature for 4WW for home.    Treatment Today     TREATMENT MINUTES COMMENTS   Evaluation     Self-care/ Home management     Manual therapy     Neuromuscular Re-education     Therapeutic Activity     Therapeutic Exercises 15 Ex per flow sheet   Gait training 9 Per above   Modality__________________                Total 24    Blank areas are intentional and mean the treatment did not include these items.       Roosevelt Vail, PT, DPT  8/29/2019    "

## 2021-05-31 NOTE — PROGRESS NOTES
AUDIOLOGY REPORT    SUBJECTIVE: Teodoro Gregorio, 79 y.o. year old male , was seen on 08/13/19 for a hearing aid check. He has a history of bilateral moderate to severe sensorineural hearing loss and was fit with bilateral Phonak Bolero V50-P BTE hearing aids on 11/2016. Teodoro was never seen for follow up after being fit with the hearing aids.     Today, Teodoro reports that the hearing aids are too loud and that he has only worn them 2 days since he has had them.    OBJECTIVE:     Programming changes: Feedback management completed. Gain reduced from 100% to 90% target. He reported comfort and good sound quality.  Listening Check: clear signal  Data Logging: .2 hours of use per day.    ASSESSMENT: Hearing aid check completed. Provided with 3 month supply of batteries.    PLAN: It is recommended that Teodoro return in 6 months for a hearing aid check and hearing evaluation, or sooner should concerns arise. Please call our clinic at [phone] with questions or concerns.    Jose Cartagena.  Clinical Audiologist  [place] #[id number]

## 2021-05-31 NOTE — PATIENT INSTRUCTIONS - HE

## 2021-05-31 NOTE — TELEPHONE ENCOUNTER
----- Message from Mihaela Deras CNP sent at 8/26/2019  2:40 PM CDT -----  Please call patient and notify him that I did review his lumbar spine MRI which does show lumbar scoliosis and facet arthritis as well as multilevel disc height loss with nerve compression, no concerning findings however. Recommend following up with physical therapy, if symptoms are not improving follow-up in clinic to discuss injection options.

## 2021-06-01 VITALS — WEIGHT: 125.31 LBS | BODY MASS INDEX: 23.66 KG/M2 | HEIGHT: 61 IN

## 2021-06-01 VITALS — WEIGHT: 125.25 LBS | HEIGHT: 61 IN | BODY MASS INDEX: 23.65 KG/M2

## 2021-06-01 VITALS — WEIGHT: 120.25 LBS | HEIGHT: 61 IN | BODY MASS INDEX: 22.7 KG/M2

## 2021-06-01 NOTE — PROGRESS NOTES
9-25-19  Self referral to CCC.  Patient was at [facility] and transfer to [facility].  CCC was offered in the past but patient cancelled RN assessment 2x.    9-25-19  Patient requested to see Care Guide for support with citizenship.  Met with patient in clinic.  Present: PCA/friend-Lev Willson : Juan Napoles    The Clinic Community Health Worker met patient and described to the patient and immediate needs were discussed.  The patient agreed to enrollment and an assessment was scheduled.  The patient was provided with contact information for the clinic CHW.              Patient reported needs help with:  -becoming [place] citizenship. Came [place]4-[place]  -would like to see therapist. Suggested patient to share with PCP at today's visit.  -wants to switch to [employer] insurance not [employer]    Assessment date: SW Assessment on 10-21-19 at 10am  Provided patient CCC brochure and CHW'sbusiness card and appt reminder card 10-21-19 at 10 am if need to reschedule appt with CCC SW.      Background info:  -[place]ame to [place] 3[place]  -Received SSI benefit  -Has [employer] Services-3.3 hours  -Friend/PCA -Eh Lev Green     PCA agency -[facility] [phone]; [id number], [facility] [address]      Plan:   meet in clinic 10-21-19 sign MANJU to [facility]

## 2021-06-01 NOTE — PROGRESS NOTES
[facility] CLINIC EXAM NOTE      Chief Complaint   Patient presents with     Follow-up     Left knee pain     Rash     on neck, on going       Due to language barrier, an  was present during the history-taking and subsequent discussion (and for part of the physical exam) with this patient.        ASSESSMENT & PLAN    Teodoro was seen today for follow-up and rash.    Diagnoses and all orders for this visit:    Rash of neck: Difficult to say if it is underlying tinea versus just inflamed/atopic dermatitis.  Will treat with both Clotrimazole and betamethasone.  Discussed if tinea then can take up to 4 to 6 weeks to treat.  Betamethasone will calm down the inflammation that will probably get a better idea what is going on.  Follow-up in 2 weeks if there is no improvement at all.  -     clotrimazole-betamethasone (LOTRISONE) cream; Apply to affected area 2 times daily    Chronic pain of left knee: Continue with Tylenol threes, start capsaicin cream as needed.  Follow-up with Ortho or here for steroid injections in the future if needed.  -     acetaminophen-codeine (TYLENOL-CODEINE #3) 300-30 mg per tablet; Take 1 tablet by mouth every 4 (four) hours as needed for pain.  -     capsaicin (ZOSTRIX) 0.025 % cream; Apply topically 2 (two) times a day.    Bilateral low back pain without sciatica, unspecified chronicity: Continue with physical therapy and follow-up with spine clinic  -     acetaminophen-codeine (TYLENOL-CODEINE #3) 300-30 mg per tablet; Take 1 tablet by mouth every 4 (four) hours as needed for pain.    Dizziness: Stable continue management  -     meclizine (ANTIVERT) 25 mg tablet; Take 1 tablet (25 mg total) by mouth 3 (three) times a day as needed for dizziness or nausea.    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  Discussed me in the future him and his wife can sit down do some couples counseling.  Consider  medication if needed.  -     Ambulatory referral to Psychology    Need for influenza vaccination  -     Influenza High Dose,Seasonal,PF 65+ Yrs    Financial difficulty  -     Ambulatory referral to Care Management (Primary Care)        HISTORY    Teodoro Gregorio is a 79 y.o.  male who presents for the following issues:    1.  Follow-up pain: Asking for refill of Tylenol 3 today.  He has been seen by the spine care specialist who did an MRI noting some arthritic changes to the lumbar spine.  He is currently doing physical therapy which is going well and he has a walker now which he really enjoys and finds very helpful.  Pain in the knee is improved after getting steroid injections with Ortho.  Per Ortho note could be some mild meniscal tear but nothing worth repairing.  Continue with physical therapy for the knees as well and then steroid injections as needed.  They are wondering about a cream that he can put on his knees to help with his pain.  Something that warms them up.  Wants to put it on before he does physical therapy.    2.  Rash on his neck: Has been there for a while.  Started off smaller than the size of a dime and now increasing but size of a quarter.  Been there for at least the last year.  Often itchy.    3.  Financial concerns: Met with care guide today to go over what they do and would like to start this program.  Referral placed today.    4.  Aun mention to me during their visit he wanted a referral for therapy.  Discussed it looks like he has diagnosis of mild depression on his chart.  Here with his niece today who he currently lives with.  Sounds like back in 2015 he lived in [place] with his wife.  Moved to [place] in 2016 at that point she wanted to not live with him anymore.  Something about moving down to [place] with 1 of his other kids but did not do well there and the wife still did not want to live with him so he moved back to [place] to live with his niece because her  felt bad for him.   Wife is now here but still does not want to live together.  Also they talk about how he had a grandchild that he basically raised and then went back to live with his father who does not like him and now he does not get see that grandchild anymore.    Little interest or pleasure in doing things: Nearly every day  Feeling down, depressed, or hopeless: Nearly every day  Trouble falling or staying asleep, or sleeping too much: Nearly every day  Feeling tired or having little energy: Nearly every day  Poor appetite or overeating: More than half the days  Feeling bad about yourself - or that you are a failure or have let yourself or your family down: Several days  Trouble concentrating on things, such as reading the newspaper or watching television: Not at all  Moving or speaking so slowly that other people could have noticed. Or the opposite - being so fidgety or restless that you have been moving around a lot more than usual: Not at all  Thoughts that you would be better off dead, or of hurting yourself in some way: Not at all  PHQ-9 Total Score: 15          MEDICATIONS    Current Outpatient Medications on File Prior to Visit   Medication Sig Dispense Refill     acetaminophen (TYLENOL) 325 MG tablet Take 1 tablet (325 mg total) by mouth every 4 (four) hours as needed for pain. 90 tablet 5     azelastine (OPTIVAR) 0.05 % ophthalmic solution Use one drop in eye daily 6 mL 12     FLUoxetine (PROZAC) 20 MG capsule Take 1 capsule (20 mg total) by mouth daily. 30 capsule 5     gabapentin (NEURONTIN) 300 MG capsule Take 2 capsules (600 mg total) by mouth 2 (two) times a day. 120 capsule 2     multivitamin (ONE A DAY) per tablet Take 1 tablet by mouth daily. 120 tablet 2     olopatadine 0.2 % Drop Apply 1 drop in eyes once a day 1 Bottle 3     traZODone (DESYREL) 50 MG tablet Take 1 tablet (50 mg total) by mouth at bedtime. 90 tablet 1     [DISCONTINUED] acetaminophen-codeine (TYLENOL-CODEINE #3) 300-30 mg per tablet Take 1  tablet by mouth every 4 (four) hours as needed for pain. 22 tablet 0     [DISCONTINUED] meclizine (ANTIVERT) 25 mg tablet Take 1 tablet (25 mg total) by mouth 3 (three) times a day as needed for dizziness or nausea. 30 tablet 1     polyethylene glycol (GLYCOLAX) 17 gram/dose powder   0     triamcinolone (KENALOG) 0.025 % cream Apply thin layer to affected area twice a day 30 g 1     No current facility-administered medications on file prior to visit.        Pertinent past medical, surgical, social and family history reviewed and updated in Epic.    Social History     Socioeconomic History     Marital status:      Spouse name: Not on file     Number of children: Not on file     Years of education: Not on file     Highest education level: Not on file   Occupational History     Not on file   Social Needs     Financial resource strain: Not on file     Food insecurity:     Worry: Not on file     Inability: Not on file     Transportation needs:     Medical: Not on file     Non-medical: Not on file   Tobacco Use     Smoking status: Former Smoker     Smokeless tobacco: Current User     Types: Chew     Tobacco comment: betel nut   Substance and Sexual Activity     Alcohol use: No     Drug use: No     Sexual activity: Never   Lifestyle     Physical activity:     Days per week: Not on file     Minutes per session: Not on file     Stress: Not on file   Relationships     Social connections:     Talks on phone: Not on file     Gets together: Not on file     Attends Sabianist service: Not on file     Active member of club or organization: Not on file     Attends meetings of clubs or organizations: Not on file     Relationship status: Not on file     Intimate partner violence:     Fear of current or ex partner: Not on file     Emotionally abused: Not on file     Physically abused: Not on file     Forced sexual activity: Not on file   Other Topics Concern     Not on file   Social History Narrative     Not on file  "        REVIEW OF SYSTEMS    ROS: 10 pt review of systems preformed and all negative except noted in HPI.     PHYSICAL EXAM    /54 (Patient Site: Left Arm, Patient Position: Sitting, Cuff Size: Adult Small)   Pulse 76   Ht 5' 1\" (1.549 m)   Wt 119 lb (54 kg)   BMI 22.48 kg/m    GEN:  79 y.o. male sitting comfortably in no apparent distress.   HEENT: EOMI, no scleral icterus, buccal mucosa moist  Neck: Quarter size pink scaly patch on his right neck at the base.  Difficult to assess if there is raised borders.  Mostly just scaly.  Large plaque scales.  CHEST/LUNG: No respiratory distress  SKIN: warm, dry, no rashes or lesions  NEURO: Using walker  PSYCH:  Mood and affect appropriate, engaged in conversation making good eye contact.  Does not appear depressed.      At the end of the encounter, I discussed results, diagnosis, medications. Discussed red flags for immediate return to clinic/ER, as well as indications for follow up if no improvement. Patient and/or caregiver understood and agreed to plan. Patient was stable for discharge.    Merary Montes    "

## 2021-06-01 NOTE — TELEPHONE ENCOUNTER
Left message with family member that Teodoro's hearing aids are ready to be picked up at the [facility] . Called using  services.     Jose Cartagena.  Clinical Audiologist  [place] #[id number]

## 2021-06-01 NOTE — PROGRESS NOTES
"[facility] Daily Progress     Patient Name: Teodoro Gregorio  Date: 9/17/2019  Visit #: 8  PTA visit #: 3   PRECAUTIONS: falls risk  Referral Diagnosis:   M47.816 (ICD-10-CM) - Lumbar facet arthropathy   M51.36 (ICD-10-CM) - DDD (degenerative disc disease), lumbar   M54.5, G89.29 (ICD-10-CM) - Chronic bilateral low back pain without sciatica   M25.562, G89.29 (ICD-10-CM) - Chronic pain of left knee   R20.0, R20.2 (ICD-10-CM) - Numbness and tingling of left lower extremity      Referring provider: Mihaela Deras C*  Visit Diagnosis:     ICD-10-CM    1. Chronic bilateral low back pain with left-sided sciatica M54.42     G89.29    2. Generalized muscle weakness M62.81    3. Unsteadiness on feet R26.81          Assessment:     Reports feeling better, no problems with transfer  Walking 30 minutes, sleeping OK  Signs/sx consistent with chronic LBP with mobility and stability deficits, likely due to OA/degnerative changes, with concurrent L LE paresthesias, likely due to lumbar foraminal stenosis.  HEP/POC compliance is  good .  Patient demonstrates understanding/independence with home program.  Patient is appropriate to continue with skilled physical therapy intervention, as indicated by initial plan of care.   Pt reports decreased intensity of pain with MT post session today.    Goal Status: progressing towards  Pt. will be independent with home exercise program in : Partially met  Pt. will have improved quality of sleep: Met  Pt. will be able to walk : Met  Patient will transfer: sit/stand;supine/sit;for in/out of bed;for in/out of chair;with less pain;with less difficulty;in 6 weeks    Patient will decrease : DOMINIC score;for improved quality of life;in 6 weeks;by _ points  by ___ points: >= 30% from IE      Plan / Patient Education:     Continue per POC, progressing core and LE strength as tolerated.  Continue MT per below  Review steps up ex    Subjective:     Pain Rating: \"Mild\" low back pain   Mild 5 /10 " constant pain c/o left knee pain that is constant  Reports feeling better  No falls  Able to walk 1 block, sleeping OK, transfers OK  Ex 1x/day    Objective:   Reports decreased LBP  Added step ups, side step ups, side stepping, backward walking, tandem walk, and prone leg lifts  All fours arm/leg lifts 5x mamie  Continued MT per flow sheet  Reports relief following MT      Treatment Today 9/17/2019      TREATMENT MINUTES COMMENTS   Evaluation     Self-care/ Home management     Manual therapy 12 Prone with pillow under stomach:  Lumbar central PAs grade II>III and STM to lumbar paraspinals   Neuromuscular Re-education 18 Balance ex per flow sheet     Therapeutic Activity     Therapeutic Exercises     Gait training     Modality__________________                Total 30    Blank areas are intentional and mean the treatment did not include these items.       Mitchell Clark, PT, DPT  9/17/2019

## 2021-06-01 NOTE — PROGRESS NOTES
[facility] Daily Progress     Patient Name: Teodoro Gregorio  Date: 9/5/2019  Visit #: 5  PTA visit #:    PRECAUTIONS: falls risk  Referral Diagnosis:   M47.816 (ICD-10-CM) - Lumbar facet arthropathy   M51.36 (ICD-10-CM) - DDD (degenerative disc disease), lumbar   M54.5, G89.29 (ICD-10-CM) - Chronic bilateral low back pain without sciatica   M25.562, G89.29 (ICD-10-CM) - Chronic pain of left knee   R20.0, R20.2 (ICD-10-CM) - Numbness and tingling of left lower extremity      Referring provider: Mihaela Deras C*  Visit Diagnosis:     ICD-10-CM    1. Chronic bilateral low back pain with left-sided sciatica M54.42     G89.29    2. Generalized muscle weakness M62.81    3. Unsteadiness on feet R26.81          Assessment:     Based on falls assessment, patient is at an increased risk for falling. Plan of care will address this risk with lower extremity strengthening and balance training.  Signs/sx consistent with chronic LBP with mobility and stability deficits, likely due to OA/degnerative changes, with concurrent L LE paresthesias, likely due to lumbar foraminal stenosis.  HEP/POC compliance is  good .  Patient demonstrates understanding/independence with home program.  Patient is appropriate to continue with skilled physical therapy intervention, as indicated by initial plan of care.   Pt reports decreased intensity of pain with MT post session today.    Goal Status:  Pt. will be independent with home exercise program in : 6 weeks  Pt. will have improved quality of sleep: waking less times/night;in 6 weeks  Pt. will be able to walk : 10 minutes;for community mobility;for household mobility;with less pain;with less difficulty;in 6 weeks  Patient will transfer: sit/stand;supine/sit;for in/out of bed;for in/out of chair;with less pain;with less difficulty;in 6 weeks    Patient will decrease : DOMINIC score;for improved quality of life;in 6 weeks;by _ points  by ___ points: >= 30% from IE      Plan / Patient  "Education:     Continue per POC, progressing core and LE strength as tolerated.  Review standing balance ex.  Continue MT per below, and assess KTape effectiveness.    Subjective:     Pain Rating: \"Mild\" low back pain  [facility] Fitted for correct height there. PT double checking in clinic today as well.    Objective:     Added standing LE dynamic balance ex to HEP today. Pt's daughter reports he does these exercises 1x/week at adult day care. Encouraged to perform 2-3 additional times/week at home as well.  Increased muscle spasm and mild TTP lumbar paraspinals. Hypomobility noted with central PAs to lumbar spine.    Treatment Today     TREATMENT MINUTES COMMENTS   Evaluation     Self-care/ Home management     Manual therapy 12 Prone with pillow under stomach:  Lumbar central PAs grade II>III and STM to lumbar paraspinals   Neuromuscular Re-education 18 Balance ex per flow sheet  KTape lumbar paraspinals to decrease muscle spasm/pain   Therapeutic Activity     Therapeutic Exercises     Gait training     Modality__________________                Total 30    Blank areas are intentional and mean the treatment did not include these items.       Roosevelt Vail, PT, DPT  9/5/2019    "

## 2021-06-01 NOTE — PROGRESS NOTES
"[facility] Daily Progress     Patient Name: Teodoro Gregorio  Date: 9/19/2019  Visit #: 9  PTA visit #: 4   PRECAUTIONS: falls risk  Referral Diagnosis:   M47.816 (ICD-10-CM) - Lumbar facet arthropathy   M51.36 (ICD-10-CM) - DDD (degenerative disc disease), lumbar   M54.5, G89.29 (ICD-10-CM) - Chronic bilateral low back pain without sciatica   M25.562, G89.29 (ICD-10-CM) - Chronic pain of left knee   R20.0, R20.2 (ICD-10-CM) - Numbness and tingling of left lower extremity      Referring provider: Mihaela Deras C*  Visit Diagnosis:     ICD-10-CM    1. Chronic bilateral low back pain with left-sided sciatica M54.42     G89.29    2. Generalized muscle weakness M62.81    3. Unsteadiness on feet R26.81          Assessment:     Reports feeling better, no problems with transfer  Walking 30 minutes, sleeping OK  Independent with HEP daughter supervises  Pt reports decreased intensity of pain with MT post session today.    Goal Status: met   Pt. will be independent with home exercise program in : Partially met  Pt. will have improved quality of sleep: Met  Pt. will be able to walk : Met  Patient will transfer: sit/stand;supine/sit;for in/out of bed;for in/out of chair;with less pain;with less difficulty;in 6 weeks    Patient will decrease : DOMINIC score;for improved quality of life;in 6 weeks;by _ points  by ___ points: >= 30% from IE      Plan / Patient Education:     Follow up MD  No further PT scheduled    Subjective:     Pain Rating: \"Mild\" low back pain   Intermittent  pain c/o left knee pain that is constant  Reports feeling better  No falls  Able to walk 1 block, sleeping OK, transfers OK  Ex 1x/day    Objective:   Reports decreased LBP  Added step ups, side step ups, side stepping, backward walking, tandem walk, and prone leg lifts  All fours arm/leg lifts 10x mamie  Continued MT per flow sheet  Reports relief following MT  Independent with HEP daughter supervises  Goals met  Ostwetry score 18 point " improvement      Treatment Today 9/19/2019      TREATMENT MINUTES COMMENTS   Evaluation     Self-care/ Home management     Manual therapy 12 Prone with pillow under stomach:  Lumbar central PAs grade II>III and STM to lumbar paraspinals   Neuromuscular Re-education 18 Balance ex per flow sheet     Therapeutic Activity     Therapeutic Exercises     Gait training     Modality__________________                Total 30    Blank areas are intentional and mean the treatment did not include these items.       Mitchell Clark, PT, DPT  9/19/2019

## 2021-06-01 NOTE — PROGRESS NOTES
[facility] Daily Progress     Patient Name: Teodoro Gregorio  Date: 9/10/2019  Visit #: 6  PTA visit #: 1   PRECAUTIONS: falls risk  Referral Diagnosis:   M47.816 (ICD-10-CM) - Lumbar facet arthropathy   M51.36 (ICD-10-CM) - DDD (degenerative disc disease), lumbar   M54.5, G89.29 (ICD-10-CM) - Chronic bilateral low back pain without sciatica   M25.562, G89.29 (ICD-10-CM) - Chronic pain of left knee   R20.0, R20.2 (ICD-10-CM) - Numbness and tingling of left lower extremity      Referring provider: Mihaela Deras C*  Visit Diagnosis:     ICD-10-CM    1. Chronic bilateral low back pain with left-sided sciatica M54.42     G89.29    2. Generalized muscle weakness M62.81    3. Unsteadiness on feet R26.81          Assessment:     Based on falls assessment, patient is at an increased risk for falling. Plan of care will address this risk with lower extremity strengthening and balance training.  Signs/sx consistent with chronic LBP with mobility and stability deficits, likely due to OA/degnerative changes, with concurrent L LE paresthesias, likely due to lumbar foraminal stenosis.  HEP/POC compliance is  good .  Patient demonstrates understanding/independence with home program.  Patient is appropriate to continue with skilled physical therapy intervention, as indicated by initial plan of care.   Pt reports decreased intensity of pain with MT post session today.    Goal Status: progressing towards  Pt. will be independent with home exercise program in : 6 weeks  Pt. will have improved quality of sleep: waking less times/night;in 6 weeks  Pt. will be able to walk : 10 minutes;for community mobility;for household mobility;with less pain;with less difficulty;in 6 weeks  Patient will transfer: sit/stand;supine/sit;for in/out of bed;for in/out of chair;with less pain;with less difficulty;in 6 weeks    Patient will decrease : DOMINIC score;for improved quality of life;in 6 weeks;by _ points  by ___ points: >= 30% from  "IE      Plan / Patient Education:     Continue per POC, progressing core and LE strength as tolerated.  Review standing balance ex.  Continue MT per below, and assess KTape effectiveness.    Subjective:     Pain Rating: \"Mild\" low back pain 8/10 constant pain  Ex 1x/day    Objective:     reviewed standing LE dynamic balance ex to HEP today.   Discussed walking more during the day at home  Increased muscle spasm and mild TTP lumbar paraspinals. Hypomobility noted with central PAs to lumbar spine.  Reports relief following MT  Added prone knee bends and glut sets    Treatment Today     TREATMENT MINUTES COMMENTS   Evaluation     Self-care/ Home management     Manual therapy 12 Prone with pillow under stomach:  Lumbar central PAs grade II>III and STM to lumbar paraspinals   Neuromuscular Re-education 18 Balance ex per flow sheet  KTape lumbar paraspinals to decrease muscle spasm/pain   Therapeutic Activity     Therapeutic Exercises     Gait training     Modality__________________                Total 30    Blank areas are intentional and mean the treatment did not include these items.       Mitchell Clark, PT, DPT  9/10/2019    "

## 2021-06-01 NOTE — PROGRESS NOTES
"[facility] Daily Progress     Patient Name: Teodoro Gregorio  Date: 9/12/2019  Visit #: 7  PTA visit #: 2   PRECAUTIONS: falls risk  Referral Diagnosis:   M47.816 (ICD-10-CM) - Lumbar facet arthropathy   M51.36 (ICD-10-CM) - DDD (degenerative disc disease), lumbar   M54.5, G89.29 (ICD-10-CM) - Chronic bilateral low back pain without sciatica   M25.562, G89.29 (ICD-10-CM) - Chronic pain of left knee   R20.0, R20.2 (ICD-10-CM) - Numbness and tingling of left lower extremity      Referring provider: Mihaela Deras C*  Visit Diagnosis:     ICD-10-CM    1. Chronic bilateral low back pain with left-sided sciatica M54.42     G89.29    2. Generalized muscle weakness M62.81    3. Unsteadiness on feet R26.81          Assessment:     Reports feeling better, no problems with transfer  Walking 30 minutes, sleeping OK  Signs/sx consistent with chronic LBP with mobility and stability deficits, likely due to OA/degnerative changes, with concurrent L LE paresthesias, likely due to lumbar foraminal stenosis.  HEP/POC compliance is  good .  Patient demonstrates understanding/independence with home program.  Patient is appropriate to continue with skilled physical therapy intervention, as indicated by initial plan of care.   Pt reports decreased intensity of pain with MT post session today.    Goal Status: progressing towards  Pt. will be independent with home exercise program in : Partially met  Pt. will have improved quality of sleep: Met  Pt. will be able to walk : Met  Patient will transfer: sit/stand;supine/sit;for in/out of bed;for in/out of chair;with less pain;with less difficulty;in 6 weeks    Patient will decrease : DOMINIC score;for improved quality of life;in 6 weeks;by _ points  by ___ points: >= 30% from IE      Plan / Patient Education:     Continue per POC, progressing core and LE strength as tolerated.  Review standing balance ex.  Continue MT per below    Subjective:     Pain Rating: \"Mild\" low back pain 7/10 " constant pain  Reports feeling better  No falls  Able to walk 1 block, sleeping OK, transfers OK  Ex 1x/day    Objective:     Reports decreased pain  Added step ups, side step ups, side stepping, backward walking, tandem walk, and prone leg lifts  Reports relief following MT  reviewed prone knee bends and glut sets    Treatment Today     TREATMENT MINUTES COMMENTS   Evaluation     Self-care/ Home management     Manual therapy 12 Prone with pillow under stomach:  Lumbar central PAs grade II>III and STM to lumbar paraspinals   Neuromuscular Re-education 18 Balance ex per flow sheet  KTape lumbar paraspinals to decrease muscle spasm/pain   Therapeutic Activity     Therapeutic Exercises     Gait training     Modality__________________                Total 30    Blank areas are intentional and mean the treatment did not include these items.       Mitchell Clark, PT, DPT  9/12/2019

## 2021-06-02 VITALS — BODY MASS INDEX: 23.03 KG/M2 | HEIGHT: 61 IN | WEIGHT: 122 LBS

## 2021-06-02 NOTE — PROGRESS NOTES
[facility] Discharge Summary  Patient Name: Teodoro Gregorio  Date: 10/10/2019  Referral Diagnosis: [unfilled]  Referring provider: Juan Sethi MD  Visit Diagnosis:   1. Chronic bilateral low back pain with left-sided sciatica     2. Generalized muscle weakness     3. Unsteadiness on feet         Goals:  Pt. will be independent with home exercise program in : Partially met  Pt. will have improved quality of sleep: Met  Pt. will be able to walk : Met  Patient will transfer: sit/stand;supine/sit;for in/out of bed;for in/out of chair;with less pain;with less difficulty;in 6 weeks. Met  Patient will decrease : DOMINIC score;for improved quality of life;in 6 weeks;by _ points  by ___ points: >= 30% from IE. Not Assessed      Patient was seen for 9 visits from 8/8 to 9/19/19 with 0 missed appointments.  The patient met goals and has demonstrated understanding of and independence in the home program for self-care, and progression to next steps.  He will initiate contact if questions or concerns arise.  No further therapy is required at this time.    Therapy will be discontinued at this time.  The patient will need a new referral to resume.    Thank you for your referral.  Roosevelt Vail  10/10/2019  10:41 AM
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
## 2021-06-02 NOTE — PROGRESS NOTES
10-21-19  Patient enrolled in [facility] 10-21-19  Joint visit with Grant Giang, [facility] LIOR.  Patient accompany by SAVANNAH.  Anamika : Juan Castro.    Provided patient welcome folder and explained Walk In Clinic, 24/7 Care Connection, Advanced Directive form, CCC brochure, and [facility] direct contact number.  Patient signed MANJU [facility].    Plan:  [facility] follow up 11-25-19

## 2021-06-02 NOTE — TELEPHONE ENCOUNTER
It has been 13 days and patient has not picked up the RX. Placing the RX back to Dr. Montes's mailbox and completing the task.

## 2021-06-03 VITALS
DIASTOLIC BLOOD PRESSURE: 54 MMHG | WEIGHT: 119 LBS | SYSTOLIC BLOOD PRESSURE: 100 MMHG | HEART RATE: 76 BPM | BODY MASS INDEX: 22.47 KG/M2 | HEIGHT: 61 IN

## 2021-06-03 VITALS — BODY MASS INDEX: 22.28 KG/M2 | WEIGHT: 118 LBS | HEIGHT: 61 IN

## 2021-06-03 VITALS
HEIGHT: 61 IN | BODY MASS INDEX: 23.79 KG/M2 | DIASTOLIC BLOOD PRESSURE: 52 MMHG | WEIGHT: 126 LBS | SYSTOLIC BLOOD PRESSURE: 96 MMHG | HEART RATE: 62 BPM

## 2021-06-03 VITALS — WEIGHT: 119 LBS | HEIGHT: 61 IN | BODY MASS INDEX: 22.47 KG/M2

## 2021-06-03 VITALS — BODY MASS INDEX: 22.66 KG/M2 | WEIGHT: 120 LBS | HEIGHT: 61 IN

## 2021-06-03 VITALS — BODY MASS INDEX: 23.22 KG/M2 | WEIGHT: 123 LBS | HEIGHT: 61 IN

## 2021-06-03 VITALS — BODY MASS INDEX: 22.92 KG/M2 | WEIGHT: 121.38 LBS | HEIGHT: 61 IN

## 2021-06-03 NOTE — PROGRESS NOTES
"Mental Health Visit Note    11/21/2019    Start time: 1:07pm    Stop Time: 2:02pm   Session # 1    Session Type: Patient is presenting for an Individual session.    Teodoro Gregorio is a 79 y.o. male is being seen today for    Chief Complaint   Patient presents with      Follow Up     coping with body aches and bothersome thoughts       New symptoms or complaints: None    Functional Impairment:   Personal: 4  Family: 3  Work: 4  Social:4    Clinical assessment of mental status:   Grooming: Well groomed  Attire: Appropriate  Age: Appears Stated  Behavior Towards Examiner: guarded at times, minimal engagement.   Motor Activity: slow   Eye Contact: fleeting  Mood: Depressed  Affect: Depressed, covers his mouth with cloth often when talking, occasional smile.   Speech/Language: Soft and raspy  Attention: Distractible  Concentration: Brief  Thought Process: Within normal, intrusive thoughts  Thought Content: Hallucinations: Within noraml  Delusions: Within normal  Orientation: X 3  Memory: Impairment  Judgement: No Evidence of Impairment  Estimated Intelligence: Below Average  Demonstrated Insight: Diminished  Fund of Knowledge: adequate        Suicidal/Homicidal Ideation present: None Reported This Session- not any specific SI. Does question \"Why am I still here?\" \"I'm like a fish without water who cannot breathe. Why am I still here in this world?\" Explored purpose and meaning in life; discussed the role his mar plays in his life. He is always with someone at home.  Reviewed crisis plan to call 911, go to nearest ER, or contact other supports/services. Provided printout of contact information for [facility] crisis, suicide hotline, and  urgent care.       Patient's impression of their current status: Body aches. Rates pain 4/10. Worse in evening and night. Continued bothersome thoughts. \"I don't want to talk about it.\" Continues to express a lot of hurt about how he doesn't have contact with his children. \"PCA is great, but " "not my daughter.\" \"PCA is there fore me, cares for me- I wish my wife and daughter did that.\" \"Children haven't visited me for year. I don't know why they are doing this to me.\"  Finds latricia in watching YouTube Vidoes. Helps take his mind off of bothersome thoughts. Amish also makes him happy.  PCA states he talks to himself outloud at home.       Therapist impression of patients current state: Patient presented for individual psychotherapy session. A professional female, Anamika  [facility], was present for the visit due to the language complexity. He shows visual signs of being in pain. He is chewing in session; no teeth in front upper and lower.  He is extremely hard of hearing, which is challenging in session. We were able to adjust seating arrangements which helped a bit.  speaks directly into his ear. He is wearing appropriate winter clothes and appears to be well taken care of by his PCA who is also present today. When he smiles, it lights up his whole face. Patient saw PCP on 11/8/19 and she also addressed his depression and pain concerns. Patient completed CCC Assessment on 11/15/19. Patient has good support from PCA and good medication adherence. Developed awareness of how he appears to have chosen family and how PCA has chosen to be a part of his life. Beginning implementing some cognitive reframing to help with depressive, negative thinking.  Working to focus thoughts and energy on who has been there. Focus on who is in his life rather than who isn't. There was a time in session he kept repeating himself per . It seemed to be about his relationship with his children. He has good insight into what makes his pain worse and awareness any fluctuations in it.       Type of psychotherapeutic technique provided: Insight oriented, Client centered and CBT    Progress toward short term goals:Progress as expected, returned for psychotherapy. working on building " therapeutic relationship.    Review of long term goals: Not done at today's visit. First follow up visit, will complete initial treatment plan at next session.     Diagnosis:   1. Adjustment disorder with mixed anxiety and depressed mood        Plan and Follow up: Patient is scheduled for follow up psychotherapy on 12/09/19.        Discharge Criteria/Planning: Patient will continue with follow-up until therapy can be discontinued without return of signs and symptoms.    Samantha Velazco [facility] 11/21/2019

## 2021-06-03 NOTE — PROGRESS NOTES
Clinic Care Coordination Contact    Follow Up Progress Note      Assessment: LIOR met with Teodoro, Lev Ohara Peter, and .     While LIOR was contacting [facility] to complete an intake, LIOR noticed Teodoro has not been in the [place] for 5 years. It will be 5 years in March 2020. LIOR asked  at [facility] if intake can still be completed, they suggested to wait until March so they don't get lost in their system. Teodoro and Lev OHARA were understanding.    Teodoro had a 2 page Healthcare Directive to complete. LIOR assisted in completing the form. Suggested they ask a clinic employee next week if they can notarize for them. LIOR showed them where their office is.     Informed them his Medical Assistance is active according to [place] as of today.    Contacted Care Coordinator's assistant because CC's phone number is still not working. Left voicemail for the assistant asking if they can assist with getting a shower chair.    Goals addressed this encounter:   Goals Addressed                 This Visit's Progress      COMPLETED: Financial Wellbeing (pt-stated)        Goal Statement- My medical assistance is active according to [place] on 11/15/2019         COMPLETED: Psychosocial (pt-stated)        Goal Statement- I am unable to complete an intake with [facility] at this time, I need to wait until mid March 2020             Intervention/Education provided during outreach: See above          Plan:   Standard Outreach

## 2021-06-03 NOTE — PROGRESS NOTES
Clinic Care Coordination Contact  Care Team Conversations     SW received voicemail back from Ching, Care Coordinator Assistant with [facility]. She reported they can assist in getting a shower chair for Teodoro. She reported it would be helpful to have an order from PCP.    SW will route message to PCP and ask for an order.

## 2021-06-03 NOTE — TELEPHONE ENCOUNTER
Orders that was sent 11/22/2019 and scan is too dark. Okay to sign the one in the system and CA will fax out.     Pending orders.

## 2021-06-03 NOTE — PROGRESS NOTES
[facility] EXAM NOTE      Chief Complaint   Patient presents with     Follow-up     pain recheck       Due to language barrier, an  was present during the history-taking and subsequent discussion (and for part of the physical exam) with this patient.      ASSESSMENT & PLAN    Teodoro was seen today for follow-up.    Diagnoses and all orders for this visit:    Chronic pain of left knee:  Stable. Continue current management.   -     Discontinue: acetaminophen-codeine (TYLENOL-CODEINE #3) 300-30 mg per tablet; Take 1 tablet by mouth every 4 (four) hours as needed for pain.  -     acetaminophen-codeine (TYLENOL-CODEINE #3) 300-30 mg per tablet; Take 1 tablet by mouth every 6 (six) hours as needed for pain.    Bilateral low back pain without sciatica, unspecified chronicity:  Stable. Continue current management   -     Discontinue: acetaminophen-codeine (TYLENOL-CODEINE #3) 300-30 mg per tablet; Take 1 tablet by mouth every 4 (four) hours as needed for pain.  -     acetaminophen-codeine (TYLENOL-CODEINE #3) 300-30 mg per tablet; Take 1 tablet by mouth every 6 (six) hours as needed for pain.    Insomnia, unspecified type: stable continue current management.   -     traZODone (DESYREL) 50 MG tablet; Take 1 tablet (50 mg total) by mouth at bedtime.    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 Continue current regimen. Continue f/u with Samantha.   -     FLUoxetine (PROZAC) 20 MG capsule; Take 1 capsule (20 mg total) by mouth daily.      Rash: resolved! Post inflammation changes noted. Can stop the cream    F/u in 2-3 months    HISTORY    Teodoro Gregorio is a 79 y.o.  male who presents for the following issues:    1. F/u pain: LBP improved with PT. They never picked up the Tylenol #3 prescription in September. Would like this again as helped. Cream is also helping. States pain is stable.      2. Depression: established with Samantha and seems to be helping per patient report. Has a  somewhat flat/depressed affect however. Sleeping better. Asking for refills of Prozac and trazadone because were last prescribed by Dr Sethi and getting monthly refills- on 1 more for both.     3. Rash: much better with the cream!      MEDICATIONS    Current Outpatient Medications on File Prior to Visit   Medication Sig Dispense Refill     azelastine (OPTIVAR) 0.05 % ophthalmic solution Use one drop in eye daily 6 mL 12     capsaicin (ZOSTRIX) 0.025 % cream Apply topically 2 (two) times a day. 60 g 1     clotrimazole-betamethasone (LOTRISONE) cream Apply to affected area 2 times daily 15 g 1     gabapentin (NEURONTIN) 300 MG capsule Take 2 capsules (600 mg total) by mouth 2 (two) times a day. 120 capsule 2     meclizine (ANTIVERT) 25 mg tablet Take 1 tablet (25 mg total) by mouth 3 (three) times a day as needed for dizziness or nausea. 30 tablet 1     multivitamin (ONE A DAY) per tablet Take 1 tablet by mouth daily. 120 tablet 2     olopatadine 0.2 % Drop Apply 1 drop in eyes once a day 1 Bottle 3     [DISCONTINUED] acetaminophen (TYLENOL) 325 MG tablet Take 1 tablet (325 mg total) by mouth every 4 (four) hours as needed for pain. 90 tablet 5     [DISCONTINUED] FLUoxetine (PROZAC) 20 MG capsule Take 1 capsule (20 mg total) by mouth daily. 30 capsule 5     [DISCONTINUED] traZODone (DESYREL) 50 MG tablet Take 1 tablet (50 mg total) by mouth at bedtime. 90 tablet 1     triamcinolone (KENALOG) 0.025 % cream Apply thin layer to affected area twice a day 30 g 1     [DISCONTINUED] acetaminophen-codeine (TYLENOL-CODEINE #3) 300-30 mg per tablet Take 1 tablet by mouth every 4 (four) hours as needed for pain. 22 tablet 0     [DISCONTINUED] polyethylene glycol (GLYCOLAX) 17 gram/dose powder   0     No current facility-administered medications on file prior to visit.        Pertinent past medical, surgical, social and family history reviewed and updated in Epic.    Social History     Socioeconomic History     Marital status:  "     Spouse name: Not on file     Number of children: Not on file     Years of education: Not on file     Highest education level: Not on file   Occupational History     Not on file   Social Needs     Financial resource strain: Not on file     Food insecurity:     Worry: Not on file     Inability: Not on file     Transportation needs:     Medical: Not on file     Non-medical: Not on file   Tobacco Use     Smoking status: Former Smoker     Smokeless tobacco: Current User     Types: Chew     Tobacco comment: betel nut   Substance and Sexual Activity     Alcohol use: No     Drug use: No     Sexual activity: Never   Lifestyle     Physical activity:     Days per week: Not on file     Minutes per session: Not on file     Stress: Not on file   Relationships     Social connections:     Talks on phone: Not on file     Gets together: Not on file     Attends Oriental orthodox service: Not on file     Active member of club or organization: Not on file     Attends meetings of clubs or organizations: Not on file     Relationship status: Not on file     Intimate partner violence:     Fear of current or ex partner: Not on file     Emotionally abused: Not on file     Physically abused: Not on file     Forced sexual activity: Not on file   Other Topics Concern     Not on file   Social History Narrative     Not on file         REVIEW OF SYSTEMS    ROS: 10 pt review of systems preformed and all negative except noted in HPI.     PHYSICAL EXAM    BP 96/52 (Patient Site: Left Arm, Patient Position: Sitting, Cuff Size: Adult Small)   Pulse 62   Ht 5' 1\" (1.549 m)   Wt 126 lb (57.2 kg)   BMI 23.81 kg/m    GEN:  79 y.o. male sitting comfortably in no apparent distress.   HEENT: EOMI, no scleral icterus, buccal mucosa moist  Neck: Supple  CHEST/LUNG: No respiratory distress  SKIN: warm, dry, no rashes or lesions- Rash on neck is resolved. A little hyperpigmentation left in the lesion's place.   NEURO: Gait normal, coordination " intact  PSYCH:  Mood and affect appropriate    At the end of the encounter, I discussed results, diagnosis, medications. Discussed red flags for immediate return to clinic/ER, as well as indications for follow up if no improvement. Patient and/or caregiver understood and agreed to plan. Patient was stable for discharge.      Merary Montes

## 2021-06-03 NOTE — TELEPHONE ENCOUNTER
Name of form/paperwork: Other:  DME orders  Date form received by clinic:  11/25/19  When is the form needed by? ASAP  Patient Notified form requests are processed in 3-5 business days: Yes  (If patient needs for sooner, please note that in this message)  Okay to leave a detailed message: Yes     [facility] has not received the sign orders for shower chair.  Please re fax to : [phone]

## 2021-06-03 NOTE — PROGRESS NOTES
Dr. Giraldo wrote prescription for shower chair. It is at my desk. Would you like me to fax it somewhere?

## 2021-06-03 NOTE — PROGRESS NOTES
I can do that so long as no face-to-face visit is needed since I have not seen patient.  If face-to-face visit is needed, she will need to see patient when Dr. KEENE is back

## 2021-06-03 NOTE — PROGRESS NOTES
11-25-19  Called and spoke to patient through Anamika : Maribel - Language and follow up on goals.  Patient gave verbal permission to talk to PCA-Lev Green because he can't hear that well.  PCA reported on behalf of patient.  -did not get the shower chair. Still waiting.    Sent message to PCP Care Team Pool regarding shower chair order and where it was fax to .    Plan:  CHW follow up 12-16-19

## 2021-06-03 NOTE — TELEPHONE ENCOUNTER
11-25-19  PCA-EH Peter follow up on order for the shower chair.  She would like to know where order to was sent so she can follow up.    Please call [facility] Peter regarding status of order for shower chair.

## 2021-06-04 VITALS
DIASTOLIC BLOOD PRESSURE: 64 MMHG | BODY MASS INDEX: 23.07 KG/M2 | WEIGHT: 122.2 LBS | HEART RATE: 66 BPM | SYSTOLIC BLOOD PRESSURE: 106 MMHG | RESPIRATION RATE: 16 BRPM | HEIGHT: 61 IN

## 2021-06-04 VITALS
TEMPERATURE: 98.4 F | HEART RATE: 80 BPM | DIASTOLIC BLOOD PRESSURE: 68 MMHG | RESPIRATION RATE: 16 BRPM | OXYGEN SATURATION: 94 % | WEIGHT: 123.3 LBS | BODY MASS INDEX: 23.3 KG/M2 | SYSTOLIC BLOOD PRESSURE: 108 MMHG

## 2021-06-04 NOTE — PROGRESS NOTES
12-18-19  Scheduled Follow Up Call: Attempt 1 Community Health Worker called and left a message for the patient. If the patient is returning my call, please transfer the patient to [phone].  Upcoming appt 12-30-19 at 3pm with CARLENE Wilson    Plan:  CHW meet in clinic 12-30-19 at 3pm

## 2021-06-04 NOTE — PROGRESS NOTES
Clinic Care Coordination Contact    Follow Up Progress Note    S= Situation: Chart review of clinic care coordination enrollment status   B= Background: Patient completed goals. No new needs identified   A= Action Steps:Outreach in 2 months per protocol. Clinic care coordination will be available for patient before if needed.   R= Recommendation: Transition to maintenance status  Care guide Delegations from RN CC : Please send maintenance plan to patient and outreach per standard work protocol     Depression  Everyone feels down at times. The blues are a natural part of life. But an unhappy period that s intense or lasts for more than a couple of weeks can be a sign of depression. Depression is a serious illness. It can disrupt the lives of family and friends. If you know someone you think may be depressed, find out what you can do to help.    Know the serious signals  Warning signals for suicide include:    Threats or talk of suicide    Statements such as  I won t be a problem much longer  or  Nothing matters     Giving away possessions or making a will or  arrangements    Buying a gun or other weapon    Sudden, unexplained cheerfulness or calm after a period of depression  If you notice any of these signs, get help right away. Call a health care professional, mental health clinic, or suicide hotline and ask what action to take. In an emergency, don t hesitate to call the police.    [facility] Mental Health Crisis Lines:  [facility] [phone]  [facility] [phone]  [facility] [phone]  [facility] [phone]  [facility], Adults [phone]  [facility], Children [phone]  [facility], Adults [phone]  [facility], Children [phone]  When a Loved One Has a Mental Illness   It s hard to watch a loved one deal with mental illness. You want to help. Yet you may not know what to do. Your loved one may even push you away. But don t give up. Your support  is needed now more than ever. Talk to your loved one s health care provider. Or contact a group for families of people with mental illness. They can help give you the guidance you need.  What you can do  Living with mental illness can be overwhelming. Your loved one may say or do things that shock or frighten you. Sometimes your loved one may resist treatment. Knowing what to do can help you cope:  Help your loved one get proper care. Often people with a mental illness deny there s a problem. Or they may not be able to seek help on their own.  Encourage your loved one to stick with treatment. This may be your most crucial job. Medicines that treat mental illness can have side effects. As a result, your loved one may stop taking them. But this will likely cause symptoms to come back. You might also want to go to healthcare provider visits with your loved one to discuss medicine and other issues.  Provide emotional support. Encourage your loved one to share his or her feelings. Listen and don t . Let your loved one know he or she can count on you.  Be patient. The healing process takes time. In some cases, your loved one may never fully recover. But his or her symptoms will likely improve.  Ask them to join in. Invite your loved one to take part in activities. But don t push.  Take care of yourself. Helping your loved one can be very stressful. Take time to care for yourself. You ll have more patience and will be better able to cope.    Seeking help  If you are worried your loved one may harm themselves or attempt suicide, ask him or her. Asking doesn t cause suicide.  If the suicide risk is not immediate, call the person s healthcare provider, go to a local mental health clinic, or call the National Suicide Prevention Lifeline at 786-953-XOML (3665). It is open 24 hours a day, every day. They speak English and Argentine. This resource provides immediate crisis intervention and information on local resources. It is  free and confidential.   Don t ignore a person's talk of suicide or think it s just an attention-seeking behavior. As hard as it is, talking can save lives.  Call 911  Call 911 if the person is at immediate risk of suicide (he or she has a suicide plan and access to a method such as guns or drugs). Or take the person to the nearest emergency room. Don t leave the person alone. Remove any guns and medicines.   Resources  [place] on Mental Illness 432-621-5985 www.[place].org  National [place] of Mental Health 834-696-8459 www.[facility].nih.gov  Mental Health [place] 605-712-1747 www.[facility].org  National Suicide Prevention Lifeline 613-029-ZWJJ (9495) www.suicidepreventionlifeline.org        [facility] Mental Health Crisis Lines:  [facility] [phone]  [facility] [phone]  [facility] [phone]  [facility] [phone]  [facility], Adults [phone]  [facility], Children [phone]  [facility], Adults [phone]  [facility], Children [phone]    Emergency Plan Recommendation:    When to Use the Emergency Department (ED)  An emergency means you could die if you don t get care quickly. Or you could be hurt permanently (disabled). Read below to know when to use -- and when not to use -- an emergency department (also called ED).    Dangers to your life  Here are examples of emergencies. These need immediate care:  A hard time breathing  Severe chest pain  Choking  Severe bleeding  Suddenly not able to move or speak  Blacking out (fainting)`  Poisoning    Dangers of permanent injuries  Here are other emergencies. These also need immediate care:  Deep cuts or severe burns  Broken bones, or sudden severe pain and swelling in a joint    When it s an emergency  If you have an emergency, follow these steps:    1. Go to the nearest emergency department  If you can, go to the hospital ED closest to you right away.  If you cannot get there right away, or if it is not safe  to take yourself, call 911 or your police emergency number.  2. Call your primary care doctor  Tell your doctor about the emergency. Call within 24 hours of going to the ED.  If you cannot call, have someone call for you.  Go to your doctor (not the ED) for any follow-up care.    When it s not an emergency  If a problem is not an emergency, follow these steps:    1. Call your primary care doctor  If you don t know the name of your doctor, call your health plan.  If you cannot call, have someone call for you.  2. Follow instructions  Your doctor will tell you what you should do.  You may be told to see your doctor right away. You may be told to go to the ED. Or you may be told to go to an urgent care center.  Follow your doctor s advice.

## 2021-06-04 NOTE — PROGRESS NOTES
12-30-19  Called and spoke to patient and [facility] Peter/PCA through Anamika : [place] -Language and follow up on goals.  Patient reported to through PCA-Eheh Peter that he received his shower chair 2 weeks a go.  Consent on file to talk to PCA.  Completed goal since patient has shower chair.  Discussed if he has any other goals or needs.  Patient spoke to [facility] that he would to apply for citizen in March. Came to  March 2015.  PCA reported for patient:  -they switched to [employer] for 2020 but don't have the [employer] insurance card yet.   -would like help to apply for citizen came to [place]  2015 in March.  -would like to take a break from seeing a therapist Samantha. Requested to cancel follow up appt.    Informed PCA and patient no upcoming appts with Chetan at this time.   -has support from [facility]. 3.5 hours a day. PCA agency is [facility]  [phone].    Updated in Care Teams  -has support from [facility] Care Coordinator.    Explained and discussed transition to Maintenance for now since there is no other goals or needs at this time.  CHW follow up in 2 months 2-28-20  to schedule appt with [facility] SW for support to connect with agency to apply for citizenship application and process.  Patient and PCA okay to follow up in 2 months transition to Maintenance for now.  Wished patient happy holidays.    Routed to [facility] RN  Please review chart if patient is appropriate to transition to maintenance.  If patient is appropriate for Maintenance, please update the Emergency Plan.    Patient does not have any other goals or needs at this time.  Patient has all the support and services in the community.      Plan:  CHW complete Maintenance Plan by 1-13-20

## 2021-06-04 NOTE — PROGRESS NOTES
Patient was a no show for psychotherapy on 12/30/2019- same day cancellation as he cancelled about 2 hours prior to scheduled appointment time. He does not have a future appointment scheduled with this provider at this time. Letter sent to patient for outreach. Samantha CONCEPCION

## 2021-06-04 NOTE — PROGRESS NOTES
Outpatient Mental Health Treatment Plan    Name:  Teodoro Gregorio  :  1940  MRN:  [id number]    Treatment Plan:  Initial Treatment Plan  Intake/initial treatment plan date:  2019  Benefit and risks and alternatives have been discussed: Yes  Is this treatment appropriate with minimal intrusion/restrictions: Yes  Estimated duration of treatment:  Approximately 20 sessions.  Anticipated frequency of services:  Every 2 weeks  Necessity for frequency: This frequency is needed to establish therapeutic goals and for continuity of care in order to monitor progress.  Necessity for treatment: To address cognitive, behavioral, and/or emotional barriers in order to work toward goals and to improve quality of life.    Session Type: Patient is presenting for an Individual session.    Plan:      ? Depression    Goal:  Decrease average depression level from 3 to 1. Decrease PHQ-9 score to 4.   Strategies:    ?[x] Decrease social isolation     [x] Increase involvement in meaningful activities     ?[x] Discuss sleep hygiene     ?[x] Explore thoughts and expectations about self and others     ?[x] Process grief (loss of significant person, independence, role,        etc.)     ?[x] Assess for suicide risk     ?[x] Implement physical activity routine (with physician approval)     [x] Consider introduction of bibliotherapy and/or videos     [x] Continue compliance with medical treatment plan (or explore         barriers)       ?  ?Degree to which this is a problem: 3  Degree to which goal is met: 1  Date of Review: 2019-2020       Interpersonal stress - family discord and separation   Goal:  Increase % satisfaction with relationships from 20 to 50.  Strategies: ?[x] Learn assertive communication skills through role-play and             other techniques     ?[x]  Explore thoughts and expectations about self and others      ?[x]  Learn and practice relaxation techniques and other coping         strategies     [x] Consider  inviting others to attend conjoint sessions, and/or          consider referral for couples  or family therapy     ?[x] Consider introduction of bibliotherapy and/or videos     ?   Degree to which this is a problem: 4  Degree to which goal is met: 1  Date of Review: 12/09/2019-03/09/2020      Chronic pain  Goal:  ? Decrease average pain level from 4/10 to 2/10.   ? Increase % acceptance of pain from 60 to 75.   Strategies: ? [x] Explore thoughts and expectations about self and others    [] Explore emotional reactions to illness/injury      ? [x] Learn and practice relaxation techniques and other coping          strategies            [x] Implement physical activity routine (with physician approval)     ? [x] Engage in values clarification and goal-setting     ? [x] Consider introduction of bibliotherapy and/or videos     ? [x] Increase involvement in meaningful activities     ? [x] Discuss sleep hygiene     ? [x] Process grief (loss of significant person, independence, role,          etc.)     ? [x] Assess for suicide risk     ?  Degree to which this is a problem: 4  Degree to which goal is met: 1  Date of Review:  12/09/2019-03/09/2020        Plan:   Other:  Exposure to war  Goal: Decrease average impact of trauma symptoms from 2 to 1                             Strategies:      ?[x] Forge a therapeutic alliance built on trust in order to allow for further processing of trauma experience.     ?[x] Assessment of client-acculturation process. Psycho-education about western-therapy.     ?[x] Exposure therapy and psycho-education to develop a sense normalization, legitimization, and description of trauma reactions.      ?[x] Development of trauma timeline or narrative. Rescue missions & Memory processing when appropriate.     ?[x] Cognitive Restructuring to help make sense of the trauma and to put meaning to the trauma- develop new insights and thorough understanding of behaviors during the event- modify feelings of  guilt and shame.      ?[x] Learn and implement somatic/experiential techniques.     ?[x] Learn and Implement Mindfulness/Grounding techniques to decrease hyperarousal.     ?[x]  Identify strengths, capacities and core values. Development of most-resourced self.     ?[x]  Learn and implement skills to control impulse and attune to internal needs.     ?Degree to which this is a problem: 2  Degree to which goal is met: 1  Date of Review: 12/09/2019-03/09/2020      Functional Impairment:  1=Not at all/Rarely  2=Some days  3=Most Days  4=Every Day    Personal : 4  Family : 4  Social : 3   Work/school : 4    DSM5 Diagnoses:  1. Adjustment disorder with mixed anxiety and depressed mood    R/O PTSD  R/O Major Depressive Disorder  R/O Generalized Anxiety Disorder     Psychosocial & Contextual Factors: Exposure to war, Acculturation difficulty; Family relational stress.     WHODAS 2.0 12-item version: 37 or 77%   Scores presented in qualifiers to represent level of disability.  SEVERE Problem (high, extreme, ...) 50-95%       Clinical assessments and measures completed:. ROMEO-7, PHQ-9 and CAGE-AID, C-SSRS.    HQ-9= 8  ROMEO-7= 5  CAGE: 0/4  C-SSRS: Passive SI. Low Risk    Strengths:  Relies on mar for coping.  Supportive PCA.  Resilient. Good medication adherence. Support of Clinic Care Coordination team.  Limitations:  Guarded in session. Communication will be a challenge in appointments if he continues to not wear his hearing aids and refuses to follow up with Audiology and also with language barrier. Unemployed. Not feeling supported or connected to family including his wife and children- this causes grief/depressed mood. Poor memory.  Cultural Considerations: Patient is a 79 y.o., [place] male. Patient did not receive access to adequate health care services during the war or while living in a refugee [place]. Patient also did not receive access to any mental health services at the time to process the trauma. Patient requires  the use of an  to communicate with medical providers.  Level of Acculturation: difficulty with acculturation, Verbal / Non-verbal Communication Style: use of  and also hard of hearing.  and Spiritual Beliefs: Mormon mar.  Patient reports he is not able to understand written materials. Transportation is a barrier to attend appointments.     Persons responsible for this plan: Patient and Provider. Coordinate care with PCP and Clinic Care Coordination team.            Samantha CONCEPCION 12/9/2019  Psychotherapist Signature             Teodoro Gregorio 12/9/2019  Patient Signature:              Guardian Signature             Provider: Performed and documented by CARLENE Wilson   Date:  12/9/2019

## 2021-06-05 VITALS
BODY MASS INDEX: 23.82 KG/M2 | DIASTOLIC BLOOD PRESSURE: 64 MMHG | SYSTOLIC BLOOD PRESSURE: 116 MMHG | WEIGHT: 126.04 LBS | TEMPERATURE: 96.9 F | RESPIRATION RATE: 12 BRPM | HEART RATE: 66 BPM

## 2021-06-05 NOTE — TELEPHONE ENCOUNTER
Per Meredith at pharmacy, patient's insurance does not cover this eye drop. Patient will have to pay out of pocket, price is over $100.    azelastine (OPTIVAR) 0.05 % ophthalmic solution 6 mL 12 1/21/2020  No   Sig: Use one drop in eye daily   Sent to pharmacy as: azelastine 0.05 % eye drops (OPTIVAR)   E-Prescribing Status: Receipt confirmed by pharmacy (1/21/2020  1:25 PM CST)     DOD, please advise?

## 2021-06-05 NOTE — PROGRESS NOTES
[facility] EXAM NOTE      Chief Complaint   Patient presents with     Follow-up     coughing, runny nose, sore throat       Due to language barrier, an  was present during the history-taking and subsequent discussion (and for part of the physical exam) with this patient.    ASSESSMENT & PLAN    Teodoro was seen today for follow-up.    Diagnoses and all orders for this visit:    Throat pain:  Rapid strep negative. Not suspicious for strep based on exam and cough. Suspect viral   -     Rapid Strep A Screen-Throat  -     Group A Strep, RNA Direct Detection, Throat    Cough: No sign of dangerous illness. No pneumonia, lungs are clear, O2 sat 94%. No fever. Most likely viral infection.  Reassured, supportive treatment with humidifier, OTC medications/ antipyretics as needed. Tessalon perles.  Expect resolution in 3-4 days, otherwise follow up.   -     benzonatate (TESSALON PERLES) 100 MG capsule; Take 1 capsule (100 mg total) by mouth every 6 (six) hours as needed for cough.    Bilateral low back pain without sciatica, unspecified chronicity:  Stable refill meds  -     acetaminophen-codeine (TYLENOL-CODEINE #3) 300-30 mg per tablet; Take 1 tablet by mouth every 12 (twelve) hours as needed for pain.  -     gabapentin (NEURONTIN) 300 MG capsule; Take 2 capsules (600 mg total) by mouth 2 (two) times a day.    Dry eyes:  Stable refill drops  -     azelastine (OPTIVAR) 0.05 % ophthalmic solution; Use one drop in eye daily    Dizziness:  Stable. Refill   -     meclizine (ANTIVERT) 25 mg tablet; Take 1 tablet (25 mg total) by mouth 3 (three) times a day as needed for dizziness or nausea.    Chronic pain of left knee:  Stable. refill  -     acetaminophen-codeine (TYLENOL-CODEINE #3) 300-30 mg per tablet; Take 1 tablet by mouth every 12 (twelve) hours as needed for pain.    Mild single current episode of major depressive disorder (H): improved per patient and daughter. Wants to discontinue therapy. Continue meds  for now. F/u in 4 weeks.         HISTORY    Teodoro Gregorio is a 80 y.o.  male who presents for the following issues:    1.  Depression follow-up: Reports getting better.  Does not want to see the therapist anymore.  Would like to continue on medications for now. F/u in 4 month to rediscuss coming off?    2.  Asking about citizenship:will defer to care guides.     3.  Asking for refill of eyedrops.last in the spring.     4.  Cough x2 to 3 days.  No fevers.  A little phlegm.  White.  No sick contacts.  Worse at night.  Sore throat.  Not currently coughing during visit. No respiratory distress.         MEDICATIONS    Current Outpatient Medications on File Prior to Visit   Medication Sig Dispense Refill     capsaicin (ZOSTRIX) 0.025 % cream Apply topically 2 (two) times a day. 60 g 1     FLUoxetine (PROZAC) 20 MG capsule Take 1 capsule (20 mg total) by mouth daily. 90 capsule 1     multivitamin (ONE A DAY) per tablet Take 1 tablet by mouth daily. 120 tablet 2     traZODone (DESYREL) 50 MG tablet Take 1 tablet (50 mg total) by mouth at bedtime. 90 tablet 1     triamcinolone (KENALOG) 0.025 % cream Apply thin layer to affected area twice a day 30 g 1     clotrimazole-betamethasone (LOTRISONE) cream Apply to affected area 2 times daily 15 g 1     No current facility-administered medications on file prior to visit.        Pertinent past medical, surgical, social and family history reviewed and updated in Epic.    Social History     Socioeconomic History     Marital status:      Spouse name: Not on file     Number of children: Not on file     Years of education: Not on file     Highest education level: Not on file   Occupational History     Not on file   Social Needs     Financial resource strain: Not on file     Food insecurity:     Worry: Not on file     Inability: Not on file     Transportation needs:     Medical: Not on file     Non-medical: Not on file   Tobacco Use     Smoking status: Former Smoker     Smokeless  tobacco: Current User     Types: Chew     Tobacco comment: betel nut   Substance and Sexual Activity     Alcohol use: No     Drug use: No     Sexual activity: Never   Lifestyle     Physical activity:     Days per week: Not on file     Minutes per session: Not on file     Stress: Not on file   Relationships     Social connections:     Talks on phone: Not on file     Gets together: Not on file     Attends Hindu service: Not on file     Active member of club or organization: Not on file     Attends meetings of clubs or organizations: Not on file     Relationship status: Not on file     Intimate partner violence:     Fear of current or ex partner: Not on file     Emotionally abused: Not on file     Physically abused: Not on file     Forced sexual activity: Not on file   Other Topics Concern     Not on file   Social History Narrative     Not on file         REVIEW OF SYSTEMS    ROS: 10 pt review of systems preformed and all negative except noted in HPI.      PHYSICAL EXAM    /68   Pulse 80   Temp 98.4  F (36.9  C) (Oral)   Resp 16   Wt 123 lb 4.8 oz (55.9 kg)   SpO2 94%   BMI 23.30 kg/m    GEN:  80 y.o. male sitting comfortably in no apparent distress.   HEENT: EOMI, no scleral icterus, buccal mucosa moist, no erythema or tonsillar exudate. TM clear with good cone of light reflex   Neck: Supple without lymphadenopathy or thyromegally   CHEST/LUNG: No respiratory distress, good air flow to bases, CTAB. Did not cough during visit.   CV: RRR, S1, S2 normal; no murmurs, rubs or gallops. No edema.   SKIN: warm, dry, no rashes or lesions  NEURO: Gait normal, coordination intact  PSYCH:  Mood and affect appropriate, still quite but less depressed affect       At the end of the encounter, I discussed results, diagnosis, medications. Discussed red flags for immediate return to clinic/ER, as well as indications for follow up if no improvement. Patient and/or caregiver understood and agreed to plan. Patient was  stable for discharge.      Merary Montes

## 2021-06-05 NOTE — PROGRESS NOTES
1-13-20  My Clinic Care Coordination Wellness Plan  This Maintenance Wellness Plan provides private information in regard to the work I have done with my Care Team at my Primary Care Clinic.  This document provides insight on the goals I have worked hard to achieve.  My Care Team congratulates me on my journey to become well.  With the assistance of the Clinic Care Coordination Team and my Primary Care Provider, I have succeeded in improving areas of my health that I identified as barriers to becoming well.  I will continue to seek wellness and use the skills I have obtained to further my journey.  My Community Health Worker will follow up with me in 2 months (3-13-20).  In the meantime, if I should have any questions or concerns I will contact my Community Health [place] [address]  [phone]    My Preferred Method of Contact:  Phone: [phone]    My Primary/Preferred Language:  Anamika    Preferred Learning Style:  Reading information, Face to face discussion, Pictures/Diagrams and Hands on teaching    Emergency Contact: Extended Emergency Contact Information  Primary Emergency Contact: Mine Dean  Address: [address] [place], [place] [id number] [place] [address]  Mobile Phone: [phone]  Relation: Friend  Secondary Emergency Contact: Windy Clarke  Address: [address]           [place], [place] [address] [place] [address]  Mobile Phone: [phone]  Relation: Child     PCP:  Merary Montes DO  Care Team            Merary Montes DO   [phone] PCP - General, Family Medicine    Roxane Kruse   [phone] Lead Care Coordinator    [facility] Care Coordination; Assistant Ching Finnegan - [phone]    [facility] Peter-PCA   [phone] Patient Care Assistant, Home Health Services    PCA; Consent on file    [facility]    Juan Sethi MD   [phone] Assigned PCP    Alicia Vo   [phone]     [facility]  Case#  [id number]    Serene Farias, CHW   [phone] Community Health Worker, Primary Care - [facility]-citizenship    [phone]      support with citizenship application [address], [place] [address]  MANJU signed: 10-21-19     Charleen Estrada, RN Clinic Care Coordinator, Primary Care -     Merary Montes DO   [phone] Assigned PCP    Ching Vences   [phone]     Care Coordinator Assistant- [facility]    [phone] Home Health Services    PCA agency  3.5 hours        Accomplishments:  Goals        Patient Stated      COMPLETED: Financial Wellbeing (pt-stated)      Goal Statement- [place]         COMPLETED: I already received  a cane to walk better. (pt-stated)      Action steps to achieve this goal    Per PCA, patient already received his walker cane.     Update: 9/22/2016  Date goal set: 7/20/2016        COMPLETED: Medical: Shower Chair (pt-stated)      Goal Statement- I now have a shower chair.    Personal Plan:  If I need help to get a new shower chair I will ask PCA Eh  Peter to schedule appt with Dr Montes at [facility]  [phone] for new order.  If I need help PCA contact Roxane Kruse [phone] [facility] Care Coordinator           COMPLETED: My ENT appointment rescheduled it to 9/2 at 1:00pm and I attended my appt successfully.  (pt-stated)      Action steps to achieve this goal:  1) Per PCA, she took patient  to follow up with ENT appointment on 9/2 at 1:00pm that I missed on 7/25. If patient needs to see ENT in the future, PCA will contact care guide to help schedule for patient.     Update: 9/22/2016  Goal created 8-4-16        COMPLETED: My joint pain and back pain slowly reducing by the medications that  I continue taking.  (pt-stated)      Action steps to achieve this goal  1. There is still some joint pain and back pain but it manage fine with the medications.  The  medications is slowly helping reduce the pain and there  is no major concerns at this time. PCA continue help patient take his medications as prescribed.   2. If patient pain is getting worse PCA will contact care guide or clinic to help schedule with patient PCP.      Update: 2017   Date goal set: 2016        COMPLETED: Psychosocial (pt-stated)      Goal Statement- I am unable to complete an intake with [facility] at this time, I need to wait until mid 2020         Other      COMPLETED: PCA continue hlep coordinated patient medications, appointments and transportations as directed.       Action steps to achieve this goal  1. Per PCA, patient is doing ok. PCA will continue help coordinated patient appointment and medications as scheduled.   2. PCA continue help patient attending  his appointment successfully as scheduled.    3. Per PCA, patient doesn't have any issues with his meds. If patient have any issues with his medications, PCA will contact care guide to notify his PCP or schedule with CCC RN.     4. Per PCA, patient appt  on  at 2:00pm was cancel and rescheduled on 2017.      Update: 2016  Date goal set:2016            Advanced Directive/Living Will: The patient was given information regarding Adanced Directives/Living Will  Depression  Everyone feels down at times. The blues are a natural part of life. But an unhappy period that s intense or lasts for more than a couple of weeks can be a sign of depression. Depression is a serious illness. It can disrupt the lives of family and friends. If you know someone you think may be depressed, find out what you can do to help.     Know the serious signals  Warning signals for suicide include:    Threats or talk of suicide    Statements such as  I won t be a problem much longer  or  Nothing matters     Giving away possessions or making a will or  arrangements    Buying a gun or other weapon    Sudden, unexplained cheerfulness or  calm after a period of depression  If you notice any of these signs, get help right away. Call a health care professional, mental health clinic, or suicide hotline and ask what action to take. In an emergency, don t hesitate to call the police.     [facility] Mental Health Crisis Lines:  [facility] [phone]  [facility] [phone]  [facility] [phone]  [facility] [phone]  [facility], Adults [phone]  [facility], Children [phone]  [facility], Adults [phone]  [facility], Children [phone]  When a Loved One Has a Mental Illness   It s hard to watch a loved one deal with mental illness. You want to help. Yet you may not know what to do. Your loved one may even push you away. But don t give up. Your support is needed now more than ever. Talk to your loved one s health care provider. Or contact a group for families of people with mental illness. They can help give you the guidance you need.  What you can do  Living with mental illness can be overwhelming. Your loved one may say or do things that shock or frighten you. Sometimes your loved one may resist treatment. Knowing what to do can help you cope:    Help your loved one get proper care. Often people with a mental illness deny there s a problem. Or they may not be able to seek help on their own.    Encourage your loved one to stick with treatment. This may be your most crucial job. Medicines that treat mental illness can have side effects. As a result, your loved one may stop taking them. But this will likely cause symptoms to come back. You might also want to go to healthcare provider visits with your loved one to discuss medicine and other issues.    Provide emotional support. Encourage your loved one to share his or her feelings. Listen and don t . Let your loved one know he or she can count on you.    Be patient. The healing process takes time. In some cases, your loved one may never fully  recover. But his or her symptoms will likely improve.    Ask them to join in. Invite your loved one to take part in activities. But don t push.    Take care of yourself. Helping your loved one can be very stressful. Take time to care for yourself. You ll have more patience and will be better able to cope.       Seeking help    If you are worried your loved one may harm themselves or attempt suicide, ask him or her. Asking doesn t cause suicide.    If the suicide risk is not immediate, call the person s healthcare provider, go to a local mental health clinic, or call the National Suicide Prevention Lifeline at 292-329-QGZP (8617). It is open 24 hours a day, every day. They speak English and Martiniquais. This resource provides immediate crisis intervention and information on local resources. It is free and confidential.     Don t ignore a person's talk of suicide or think it s just an attention-seeking behavior. As hard as it is, talking can save lives.  Call 911    Call 911 if the person is at immediate risk of suicide (he or she has a suicide plan and access to a method such as guns or drugs). Or take the person to the nearest emergency room. Don t leave the person alone. Remove any guns and medicines.   Resources    National [place] on Mental Illness 781-010-2202 www.[place].org    National [place] of Mental Health 623-074-4510 www.nimh.nih.gov    Mental Health [place] [phone] www.[facility].org    National Suicide Prevention Lifeline 593-545-KVHW (0490) www.suicidepreventionlifeline.org           [facility] Mental Health Crisis Lines:  [facility] [phone]  [facility] [phone]  [facility] [phone]  [facility] [phone]  [facility], Adults [phone]  [facility], Children [phone]  [facility], Adults [phone]  [facility], Children [phone]     Emergency Plan Recommendation:     When to Use the Emergency Department (ED)  An emergency means you could die  if you don t get care quickly. Or you could be hurt permanently (disabled). Read below to know when to use -- and when not to use -- an emergency department (also called ED).     Dangers to your life  Here are examples of emergencies. These need immediate care:  A hard time breathing  Severe chest pain  Choking  Severe bleeding  Suddenly not able to move or speak  Blacking out (fainting)`  Poisoning     Dangers of permanent injuries  Here are other emergencies. These also need immediate care:  Deep cuts or severe burns  Broken bones, or sudden severe pain and swelling in a joint     When it s an emergency  If you have an emergency, follow these steps:     1. Go to the nearest emergency department  If you can, go to the hospital ED closest to you right away.  If you cannot get there right away, or if it is not safe to take yourself, call 911 or your police emergency number.  2. Call your primary care doctor  Tell your doctor about the emergency. Call within 24 hours of going to the ED.  If you cannot call, have someone call for you.  Go to your doctor (not the ED) for any follow-up care.     When it s not an emergency  If a problem is not an emergency, follow these steps:     1. Call your primary care doctor  If you don t know the name of your doctor, call your health plan.  If you cannot call, have someone call for you.  2. Follow instructions  Your doctor will tell you what you should do.  You may be told to see your doctor right away. You may be told to go to the ED. Or you may be told to go to an urgent care center.  Follow your doctor s advice.     Clinical Emergency Plan    All [facility] clinic patients have access to a Nurse 24 hours a day, 7 days a week.  If you have questions or want advice from a Nurse, please know [facility] is here for you.  You can call your clinic and they will connect you or you can call [facility] at [phone].  [facility] also has Walk In Care clinics in multiple locations.   Call the number listed above for more information about our Walk In Care clinics or visit the [employer] website at [email or web address].

## 2021-06-06 NOTE — PROGRESS NOTES
Clinic Care Coordination Contact  Care Team Conversations     SW used language line to attempt to contact patient twice for appointment. The phone rang but did not go to voicemail.    Please reschedule a phone visit with LIOR.

## 2021-06-06 NOTE — PROGRESS NOTES
3/13/2020  Met and consulted with [facility] LIOR. No goals to follow up because patient is scheduled for re assessment 3-18-20 with [facility] LIOR.  Patient needed help with citizenship.  Per [facility] LIOR no need for CHW to follow up with patient today.     Plan:  appt with [facility] LIOR on 3-18-20 at 1pm for Re Assessment/ [facility]  CHW follow up 4-13-20

## 2021-06-06 NOTE — PROGRESS NOTES
Assessment and Plan:       Teodoro was seen today for annual wellness visit.    Routine general medical examination at a health care facility:  Declined PSA screening.   -     Lipid Cascade, RANDOM  -     Glucose    Encounter for screening for diabetes mellitus  -     Glucose    Encounter for screening for lipoid disorders  -     Lipid Cascade, RANDOM        The patient's current medical problems were reviewed.    I have had an Advance Directives discussion with the patient.  The following health maintenance schedule was reviewed with the patient and provided in printed form in the after visit summary:   Health Maintenance   Topic Date Due     ZOSTER VACCINES (1 of 2) 01/01/1988     MEDICARE ANNUAL WELLNESS VISIT  01/01/2003     FALL RISK ASSESSMENT  09/25/2020     ADVANCE CARE PLANNING  11/26/2024     TD 18+ HE  04/07/2026     DEPRESSION ACTION PLAN  Completed     PNEUMOCOCCAL IMMUNIZATION 65+ LOW/MEDIUM RISK  Completed     INFLUENZA VACCINE RULE BASED  Completed        Subjective:   Chief Complaint: Teodoro Gregorio is an 80 y.o. male here for an Annual Wellness visit.   HPI: Patient presents for an annual wellness care visit.  He reports doing well.  Mood has greatly improved.  I did just see patient recently last month and we covered multiple topics at that time.  Normally comes in with his granddaughter but states she is sick today so is my first time seeing him by himself.  He does appear that his mood is better and he actually smiled a couple times during the visit which I really have not seen him do in the past.  At the last visit we addressed his chronic low back pain, chronic dry eyes, chronic dizziness, chronic left knee pain and his mild recurrent depression.  Please see that note for further details.    Review of Systems:    Please see above.  The rest of the review of systems are negative for all systems.    Patient Care Team:  Merary Montes DO as PCP - General (Family Medicine)  Roxane Kruse as Lead Care  Coordinator  [facility] Peter-SAVANNAH as Patient Care Assistant (Home Health Services)  Alicia Farias, Aun, CHW as Community Health Worker (Primary Care - CC)  [facility]  ()  Charleen Estrada, RN as Clinic Care Coordinator (Primary Care - CC)  Merary Montes DO as Assigned PCP  Ching Vences  [facility]  (Home Health Services)     Patient Active Problem List   Diagnosis     Insomnia     Multiple joint pain     Hearing loss     Soft tissue mass     SNHL (sensorineural hearing loss)     Incontinence     Back pain     Memory difficulty     Mild single current episode of major depressive disorder (H)     Loose, teeth     Loss of appetite     Past Medical History:   Diagnosis Date     Constipation      Hearing loss      Insomnia      Multiple joint pain      TB lung, latent       Past Surgical History:   Procedure Laterality Date     FOOT SURGERY Left       No family history on file.   Social History     Socioeconomic History     Marital status:      Spouse name: Not on file     Number of children: Not on file     Years of education: Not on file     Highest education level: Not on file   Occupational History     Not on file   Social Needs     Financial resource strain: Not on file     Food insecurity:     Worry: Not on file     Inability: Not on file     Transportation needs:     Medical: Not on file     Non-medical: Not on file   Tobacco Use     Smoking status: Former Smoker     Smokeless tobacco: Current User     Types: Chew     Tobacco comment: betel nut   Substance and Sexual Activity     Alcohol use: No     Drug use: No     Sexual activity: Never   Lifestyle     Physical activity:     Days per week: Not on file     Minutes per session: Not on file     Stress: Not on file   Relationships     Social connections:     Talks on phone: Not on file     Gets together: Not on file     Attends Zoroastrianism service: Not on file     Active member of club or organization: Not on file     Attends  meetings of clubs or organizations: Not on file     Relationship status: Not on file     Intimate partner violence:     Fear of current or ex partner: Not on file     Emotionally abused: Not on file     Physically abused: Not on file     Forced sexual activity: Not on file   Other Topics Concern     Not on file   Social History Narrative     Not on file      Current Outpatient Medications   Medication Sig Dispense Refill     acetaminophen-codeine (TYLENOL-CODEINE #3) 300-30 mg per tablet Take 1 tablet by mouth every 12 (twelve) hours as needed for pain. 45 tablet 0     azelastine (OPTIVAR) 0.05 % ophthalmic solution Use one drop in eye daily 6 mL 12     benzonatate (TESSALON PERLES) 100 MG capsule Take 1 capsule (100 mg total) by mouth every 6 (six) hours as needed for cough. 30 capsule 0     capsaicin (ZOSTRIX) 0.025 % cream Apply topically 2 (two) times a day. 60 g 1     clotrimazole-betamethasone (LOTRISONE) cream Apply to affected area 2 times daily 15 g 1     FLUoxetine (PROZAC) 20 MG capsule Take 1 capsule (20 mg total) by mouth daily. 90 capsule 1     gabapentin (NEURONTIN) 300 MG capsule Take 2 capsules (600 mg total) by mouth 2 (two) times a day. 120 capsule 2     meclizine (ANTIVERT) 25 mg tablet Take 1 tablet (25 mg total) by mouth 3 (three) times a day as needed for dizziness or nausea. 30 tablet 1     multivitamin (ONE A DAY) per tablet Take 1 tablet by mouth daily. 120 tablet 2     olopatadine (PATANOL) 0.1 % ophthalmic solution Administer 1 drop to both eyes 2 (two) times a day. 5 mL 1     traZODone (DESYREL) 50 MG tablet Take 1 tablet (50 mg total) by mouth at bedtime. 90 tablet 1     triamcinolone (KENALOG) 0.025 % cream Apply thin layer to affected area twice a day 30 g 1     No current facility-administered medications for this visit.       Objective:   Vital Signs:   Visit Vitals  /64 (Patient Site: Left Arm, Patient Position: Sitting, Cuff Size: Adult Regular)   Pulse 66   Resp 16   Ht 5'  "1\" (1.549 m)   Wt 122 lb 3.2 oz (55.4 kg)   BMI 23.09 kg/m         VisionScreening:  No exam data present     PHYSICAL EXAM  PHYSICAL EXAM  Vitals:    02/19/20 1518   BP: 106/64   Pulse: 66   Resp: 16     GENERAL APPEARANCE:  The patient is a pleasant, normal appearing male with normal affect and in no distress.  HEENT: Normocephalic atraumatic.  PERRL, ocular muscles intact.  No conjunctivitis or icterus noticed.   Oropharynx clear and moist mucous membranes.  Mostly edentulous/poor dentition  NECK: supple.  No cervical lymphadenopathy.  No thyromegaly, no nodules palpated, trachea midline.  LUNGS: Clear bilaterally with normal respiratory effort  HEART:   Regular rate and rhythm.  No murmurs noted.  Pulses are full and symmetrical.  ABDOMEN: Soft, non-tender, non-distended.  No hepatosplenomegaly.  Normal bowel sounds.  No umbilical or inguinal hernias.  SKIN:  Warm and dry to touch.  No lesions or rashes noted.    PSYCHIATRIC/NEUROLOGIC:  Appropriate mood and affect, normal recall, alert and oriented x 3.  Antalgic gait walks with a cane  EXTREMITIES:  Warm and well perfused.  No edema noted.  Muscle strength and sensation are normal bilaterally 5/5 in both upper and lower extremities.         Assessment Results 2/19/2020   Activities of Daily Living 5-6 - Severe functional impairment   Instrumental Activities of Daily Living 5-6 - Severe functional impairment   Get Up and Go Score Less than 12 seconds   Some recent data might be hidden     A Mini-Cog score of 0-2 suggests the possibility of dementia, score of 3-5 suggests no dementia    Identified Health Risks:     He is at risk for lack of exercise and has been provided with information to increase physical activity for the benefit of his well-being.  The patient reports that he does not have all recommended working emergency equipment available. He was provided with information about emergency preparedness, including smoke detectors.  The patient reports that " he has difficulty with activities of daily living. I have asked that the patient make a follow up appointment in 8 weeks where this issue will be further evaluated and addressed.  The patient reports that he has difficulty with instrumental activities of daily living.  He was provided with a list of local organizations that provide support services and advised to make a follow up appointment in 8 weeks to address this further.   Information on urinary incontinence and treatment options given to patient.  The patient was provided with suggestions to help him develop a healthy emotional lifestyle.   The patient's PHQ-9 score is consistent with mild depression. He was provided with information regarding depression and was advised to schedule a follow up appointment in 8 weeks to further address this issue.  Patient's advanced directive was discussed and I am comfortable with the patient's wishes.

## 2021-06-06 NOTE — PROGRESS NOTES
Clinic Care Coordination Contact  Care Team Conversations     SW used language line to inform patient to not come to the clinic on 3/18 for his appointment. SW will change appointment to a phone visit and will call him. Left CHW number for callback.      LIOR changed appointment to phone visit.

## 2021-06-07 NOTE — PROGRESS NOTES
Clinic Care Coordination Contact    Follow Up Progress Note      Assessment: Phone Visit re-assessment appt scheduled Monday, 3/30/2020 at 2:00PM with [facility] .     Goals addressed this encounter:   Goals Addressed    None       Intervention/Education provided during outreach:   CHW called a different patient (Lev, Alexi) regarding referral to [facility] service today. Language line  is use;  name Kiran; ID#: [id number]. This patient and the pca person, Lev Ohara Peter request CHW help with rescheduling the missed appt schedule 3/18/20 at 1:00PM with [facility] SW to sometime next week. Pt reassessment phone visit appt scheduled Monday, 3/30/20 at 2:00PM with [facility] LIOR. Pt and PCA confirmed no other questions. NOTE: Lev Hernandeze speak broken/little English.     Plan:   Reassessment appt with [facility] SW on 3/30/20 at 2:00PM via Phone Visit.   CHW follow up 4/13/20 per encounter dated 3/13/20.

## 2021-06-07 NOTE — PROGRESS NOTES
4/13/2020   Clinic Care Coordination Contact    Follow Up Progress Note      Assessment:     Goals addressed this encounter:   Goals Addressed                 This Visit's Progress       Patient Stated      Psychosocial (pt-stated)        Goal Statement: I want to be connected with an agency to assist me with citizenship within 1-2 months  Date Goal set: 03/30/20  Barriers: Language  Strengths: Family support  Date to Achieve By: 5/30/2020  Patient expressed understanding of goal: yes  Action steps to achieve this goal:  1. PCA will wait for [facility] to call to complete an intake  2. I will update CCC team on the status of the intake      Updated: 4-13-20 AL               Intervention/Education provided during outreach;  Called and spoke to patient's [facility] Peter through [employer] :       Language and follow up on goals due to patient hard of hearing..  Consent on file to communicate with [facility] Lev Green  Patient's PCA reported:   -he is doing fine.  -still waiting for [facility] to call the citizenship process/intake did not get a call on 3-31-20.from [facility]  -has enough foods.  -help with citoizenzhsip  Per CCC SW on 3-30-20 [facility] office will call on 3-31-20 to do intake  Provided PCA COVID-19 Hotline number [phone] if she has any questions about COVID 19 virus or resources.  PCA confirmed contact information.    Plan  CHW follow up 5-14-20   Check with [facility] status        Patient advised of the right to post-operative care by the surgeon. Patient is fully informed of, and agreed to, co-management with their primary optometric physician. Post-operative care by the surgeon is not medically necessary and co-management is clinically appropriate. Patient has received itemization of fees related to cataract surgery. Transfer of care letter completed for the patient. Transfer care of OS eye to Dr. Santana Clancy on 04/09/2021. Patient instructed to call immediately if any new distortion, blurring, decreased vision or eye pain.

## 2021-06-07 NOTE — PROGRESS NOTES
Clinic Care Coordination Contact    Follow Up Progress Note      Assessment: LIOR spoke with  Lev Green, patient's PCA to create a citizenship goal.    SW contacted [facility], Tonia ([employer] speaking employee) will call on 3/31/2020 to complete an intake.    Goals addressed this encounter:   Goals Addressed                 This Visit's Progress      Psychosocial (pt-stated)        Goal Statement: I want to be connected with an agency to assist me with citizenship within 1-2 months  Date Goal set: 03/30/20  Barriers: Language  Strengths: Family support  Date to Achieve By: 5/30/2020  Patient expressed understanding of goal: yes  Action steps to achieve this goal:  1. I will wait for [facility] to call me on 3/31/2020 to complete an intake  2. I will update CCC team on the status of the intake               Intervention/Education provided during outreach: [facility] referral          Plan:   Standard Outreach

## 2021-06-08 NOTE — PROGRESS NOTES
2-7-17  No answer.  Patient switch to [facility] and PCP is .  Patient has an appointment with Dr. Sethi on 2-9-17.    Notified previous Care Guide and PCP regarding the change.

## 2021-06-08 NOTE — PROGRESS NOTES
Clinic Care Coordination Contact    Situation: Patient chart reviewed by care coordinator.    Background: SW competed chart review    Assessment: Patient is progressing on goals. CHW assisted in providing information on how to confirm medication refills and is assisting with application status with [facility].    Plan/Recommendations: SW to chart review/outreach in 45 days

## 2021-06-08 NOTE — PROGRESS NOTES
My Clinic Care Coordination Maintenance Plan  This Maintenance Wellness Plan provides private information in regards to the work I have done with my Care Team from my Primary Care Clinic.  This document provides insight on the goals I have worked hard to achieve.  My Care Team congratulates me on my journey to become well.  With the assistance of my Clinic Care Guide and Primary Care Physician,  I have succeeded in improving areas of my health that I identified as barriers to becoming well.  I will continue to seek wellness and use the skills I have obtained to further my journey.  My Care Guide will follow up with me in 6 months.  In the meantime, if I should have any questions or concerns I will contact my Care Guide.      [facility]  [address] [place]  [address]  [phone]      My Preferred Method of Contact:  Phone: [phone]    My Primary/Preferred Language:  Anamika    Emergency Contact: Extended Emergency Contact Information  Primary Emergency Contact: Windy Clarke   [facility]  Home Phone: [phone]  Mobile Phone: [phone]  Relation: Spouse     PCP:  Juan Sethi MD  Specialists:        Advanced Directive/Living Will: The patient was given information regarding Adanced Directives/Living Will      My Care Guide's Name and Phone Number:  Tabe Ema Phone [phone]   The Care Guide and myself agreed to be in contact every 6 months.    Goals       COMPLETED: I already received  a cane to walk better. (pt-stated)            Action steps to achieve this goal    Per PCA, patient already received his walker cane.     Update: 9/22/2016  Date goal set: 7/20/2016        COMPLETED: My ENT appointment rescheduled it to 9/2 at 1:00pm and I attended my appt successfully.  (pt-stated)            Action steps to achieve this goal:  1) Per PCA, she took patient  to follow up with ENT appointment on 9/2 at 1:00pm that I missed on 7/25. If patient needs to see ENT in the  future, PCA will contact care guide to help schedule for patient.     Update: 9/22/2016  Goal created 8-4-16        COMPLETED: My joint pain and back pain slowly reducing by the medications that  I continue taking.  (pt-stated)            Action steps to achieve this goal  1. There is still some joint pain and back pain but it manage fine with the medications.  The medications is slowly helping reduce the pain and there  is no major concerns at this time. PCA continue help patient take his medications as prescribed.   2. If patient pain is getting worse PCA will contact care guide or clinic to help schedule with patient PCP.      Update: 2/17/2017   Date goal set: 7/20/2016        COMPLETED: PCA continue hlep coordinated patient medications, appointments and transportations as directed.             Action steps to achieve this goal  1. Per PCA, patient is doing ok. PCA will continue help coordinated patient appointment and medications as scheduled.   2. PCA continue help patient attending  his appointment successfully as scheduled.    3. Per PCA, patient doesn't have any issues with his meds. If patient have any issues with his medications, PCA will contact care guide to notify his PCP or schedule with CCC RN.     4. Per PCA, patient appt  on 1/20 at 2:00pm was cancel and rescheduled on 2/9/2017.      Update: 2/17/2016  Date goal set:7/20/2016         Emergency Plan Recommendation:     When to Use the Emergency Department (ED)  An emergency means you could die if you don t get care quickly. Or you could be hurt permanently (disabled). Read below to know when to use -- and when not to use -- an emergency department (also called ED).     Dangers to your life  Here are examples of emergencies. These need immediate care:  A hard time breathing  Severe chest pain  Choking  Severe bleeding  Suddenly not able to move or speak  Blacking out (fainting)`  Poisoning     Dangers of permanent injuries  Here are other  emergencies. These also need immediate care:  Deep cuts or severe burns  Broken bones, or sudden severe pain and swelling in a joint     When it s an emergency  If you have an emergency, follow these steps:     1. Go to the nearest emergency department  If you can, go to the hospital ED closest to you right away.  If you cannot get there right away, or if it is not safe to take yourself, call 911 or your police emergency number.  2. Call your primary care doctor  Tell your doctor about the emergency. Call within 24 hours of going to the ED.  If you cannot call, have someone call for you.  Go to your doctor (not the ED) for any follow-up care.     When it s not an emergency  If a problem is not an emergency, follow these steps:     1. Call your primary care doctor  If you don t know the name of your doctor, call your health plan.  If you cannot call, have someone call for you.  2. Follow instructions  Your doctor will tell you what you should do.  You may be told to see your doctor right away. You may be told to go to the ED. Or you may be told to go to an urgent care center.  Follow your doctor s advice.        Constipation (Adult)   Constipation means that you have bowel movements that are less frequent than usual. Stools often become very hard and difficult to pass.  Constipation is very common. At some point in life it affects almost everyone. Since everyone's bowel habits are different, what is constipation to one person may not be to another. Your health care provider may do tests to diagnose constipation. It depends on what he or she finds when evaluating you.  When to seek medical advice  Call your health care provider right away if any of these occur:    Fever over 100.4 F (38 C)    Failure to resume normal bowel movements    Pain in your abdomen or back gets worse    Nausea or vomiting    Swelling in your abdomen    Blood in the stool    Weakness  Call 911  Call 911 if any of these occur:    Trouble  breathing    Stiff, rigid abdomen that is severely painful to touch    Confusion    Fainting or loss of consciousness  Rapid heart rate      All [facility] clinic patients have access to a Nurse 24 hours a day, 7 days a week.  If you have questions or want advice from a Nurse, please know [facility] is here for you.  You can call your clinic and they will connect you or you can call [facility] at [phone].  [facility] also has Walk In Care clinics in multiple locations.  Call the number listed above for more information about our Walk In Care clinics or visit the [facility] website at [email or web address].

## 2021-06-08 NOTE — PROGRESS NOTES
2-7-17  Anamika  Mine Dean verified that patient switched back to [facility]. PCP is Dr. Sethi.  Notified previous Care Guide Tabe Ema and PCP regarding the changed.

## 2021-06-08 NOTE — TELEPHONE ENCOUNTER
6/3/2020  [facility]  Re: Citizenship status  [phone]    Called and left voice message to call CHW back.

## 2021-06-08 NOTE — PROGRESS NOTES
6/17/2020  Clinic Care Coordination Contact  RUST/Voicemail       Clinical Data: Care Coordinator Outreach  Outreach attempted x 1.  No answer. No voice mailbox unable to leave message on patient's voicemail with call back information and requested return call.  Plan: Care Coordinator  will try to reach patient again 6-29-20

## 2021-06-08 NOTE — PROGRESS NOTES
ASSESMENT AND PLAN:  Diagnoses and all orders for this visit:  1. Dizziness    - Comprehensive Metabolic Panel  - Thyroid Cascade  - HM2(CBC w/o Differential)  - meclizine (ANTIVERT) 25 mg tablet; Take 1 tablet (25 mg total) by mouth 3 (three) times a day as needed for dizziness.  Dispense: 90 tablet; Refill: 1    2. Fatigue    - Comprehensive Metabolic Panel  - Thyroid Cascade  - HM2(CBC w/o Differential)    3. Insomnia  - traZODone (DESYREL) 50 MG tablet; Take 1 tablet (50 mg total) by mouth bedtime.  Dispense: 30 tablet; Refill: 5  ??Depression. Pt denied.    4. Loss of appetite  - multivitamin with minerals tablet; Take 1 tablet by mouth daily.  Dispense: 30 tablet; Refill: 11  Rx for ensure provided.  Wt Readings from Last 3 Encounters:   02/09/17 111 lb 1 oz (50.4 kg)   12/13/16 118 lb (53.5 kg)   10/20/16 114 lb 5 oz (51.9 kg)   Will monitor closely.    5. Constipation, unspecified constipation type  - docusate sodium (DOK) 100 MG capsule; TAKE 1 CAPSULE(100 MG) BY MOUTH TWICE DAILY AS NEEDED FOR CONSTIPATION  Dispense: 60 capsule; Refill: 2  Increase fluids and vegetables.    6. Incontinence  - diaper,brief,adult,disposable (ADULT BRIEFS - LARGE) Misc; Use 1 daily  Dispense: 120 each; Refill: 3      SUBJECTIVE: Teodoro Gregorio is a 77-year-old male with history of chronic insomnia multiple joint pain, hearing loss, incontinence here for follow-up.  She ran out of all his medications about a week ago.  He is here with his PCA.      PCA reported that he had one episode of severe dizziness with palpitation about a month ago, lasted for about 30 minutes, he missed Sikh that day..  He's been experiencing more frequent dizziness since then.  His appetite is significantly down.  Is no history of syncope or presyncopal episode.  No chest pain or shortness of breath with this symptom.    He lives with his PCA currently.  It appears that he has some problem with his family.  He has 2 children living in [place]. The   "children live with their mother.  He denied depression symptoms.  Denies suicidal or homicidal ideations.  He has trouble sleeping at night for long time, has been taking trazodone every night, works most night.  Denied nightmares or night terrors.    He denies nausea, vomiting but has no appetite.  He eats 2 meals a day most day.  He is took some Ensure a few months ago, wondering if he can get prescription for it.  He takes multivitamin daily for supplement.    He was referred to ENT for hearing loss.  He was provided hearing aids but patient does not like wearing them.  States\" too loud \".    He wears adult diapers when he goes out and about.  Does not wear it at home per PCA.        Past Medical History:   Diagnosis Date     Constipation      Hearing loss      Insomnia      Multiple joint pain      TB lung, latent      Patient Active Problem List   Diagnosis     Insomnia     Constipation     Multiple joint pain     Hearing loss     Screening for colon cancer     Soft tissue mass     SNHL (sensorineural hearing loss)     TB lung, latent     Incontinence       Allergies:  No Known Allergies    History   Smoking Status     Former Smoker   Smokeless Tobacco     Current User     Types: Chew     Comment: betel nut       Review of systems otherwise negative except as listed in HPI.   History   Smoking Status     Former Smoker   Smokeless Tobacco     Current User     Types: Chew     Comment: betel nut       OBJECTICE:   Visit Vitals     /64     Pulse 72     Temp 98.1  F (36.7  C) (Oral)     Resp 20     Ht 5' 1\" (1.549 m)     Wt 111 lb 1 oz (50.4 kg)     SpO2 98%     BMI 20.99 kg/m2           GEN-alert,  in no apparent distress.  HEENT-missing teeth mostly lower.  CV-regular rate and rhythm with no murmur.   RESP-lungs clear to auscultation .  ABDOMEN- Soft , not tender.  EXTREM- No edema.  SKIN-normal        [facility]   2/9/2017   This transcription uses voice recognition software, which may contain typographical " errors.

## 2021-06-08 NOTE — TELEPHONE ENCOUNTER
5/13/2020     Patient's PCA Eh Peter reported patient needs new refills/scripts for the following meds.  Meclizine,   Tylenol with codeine   eye drop     [facility]

## 2021-06-09 NOTE — PROGRESS NOTES
Subjective:   Teodoro Gregorio comes in today with an .  He has had a four-day history of headache on the left side of the head.  He describes the pain as a stabbing pain.  It comes and goes.  It seems to be fairly intense at times.  Nothing in general brings it on.  There appears to be no pattern to the headache.  His only over the left side of the head.  It seems to be mainly over the left temporal area and left ear area.  He states the ear gets sharp pains shooting through it as well.  He denies head congestion.  He's had no sore throat.  He denies any neck injury recently.  He has had no associated vision changes or nausea with this.  He has not noticed a rash at all.  His hearing seems good.      Objective:  HEENT: Pupils equally round and reactive to light.  Conjunctivae are clear.  Sinuses nontender.  Nasal mucosa minimally inflamed.  Pharynx is clear.  No erythema or exudate noted.  Both TMs are gray.  No rash is noted.  Neck: Minimal palpable tenderness noted in the occiput areas bilaterally.  Cervical musculature is nontender.  Sternocleidomastoid muscle is nontender.  No bruits heard.  Lungs: Lungs are totally clear.  Patient's in no respiratory distress.  Cardiac: There is a regular rhythm present.  No murmur heard.  Blood pressure well controlled.  Neurologic: Cranial nerves all appear intact.  Patient is alert and oriented.  He answers all questions appropriately.  Speech was clear.  Motor strength was normal in upper and lower extremities.  Gait was stable.      Assessment:  1.  Left-sided headaches.  Rule out atypical migraine.  Rule out muscle tension.  Rule out trigeminal neuralgia.      Plan:  Since patient's headaches have such a sharp quality and come intermittently we will treat for trigeminal neuralgia.  We will start Tegretol 200 mg twice daily.  He was instructed in use of this.  He will follow-up here in 2 weeks for recheck.  He states he does have an appointment with his PCP within the  next month.  If he has side effects of the medications he will come in sooner.  If he develops any new symptoms associated with the headache such as double vision, vomiting, or any neurologic symptoms he will be seen urgently.  All questions were answered through the professional  today.

## 2021-06-09 NOTE — PROGRESS NOTES
Clinic Care Coordination Contact    Situation: Patient chart reviewed by care coordinator.    Background: SW completed chart review    Assessment: Patient in contact with CHW. Doing well, has PCA, no citizenship update.    Plan/Recommendations: LIOR will chart review in 45 days

## 2021-06-09 NOTE — TELEPHONE ENCOUNTER
RN cannot approve Refill Request    RN can NOT refill this medication Protocol failed and NO refill given. Last office visit: 6/14/2019 Juan Sethi MD Last Physical: 8/11/2016 Last MTM visit: Visit date not found Last visit same specialty: 1/21/2020 Merary Montes DO.  Next visit within 3 mo: Visit date not found  Next physical within 3 mo: Visit date not found      Carlotta Yanes, [facility] Triage/Med Refill 6/21/2020    Requested Prescriptions   Pending Prescriptions Disp Refills     MULTIVITAMIN tablet [Pharmacy Med Name: ONE-DAILY MULTI-VITAMIN TAB] 30 tablet 0     Sig: TAKE ONE TABLET BY MOUTH ONCE DAILY       There is no refill protocol information for this order

## 2021-06-09 NOTE — PROGRESS NOTES
Emergency Plan Recommendation:    When to Use the Emergency Department (ED)  An emergency means you could die if you don t get care quickly. Or you could be hurt permanently (disabled). Read below to know when to use -- and when not to use -- an emergency department (also called ED).    Dangers to your life  Here are examples of emergencies. These need immediate care:  A hard time breathing  Severe chest pain  Choking  Severe bleeding  Suddenly not able to move or speak  Blacking out (fainting)`  Poisoning    Dangers of permanent injuries  Here are other emergencies. These also need immediate care:  Deep cuts or severe burns  Broken bones, or sudden severe pain and swelling in a joint    When it s an emergency  If you have an emergency, follow these steps:    1. Go to the nearest emergency department  If you can, go to the hospital ED closest to you right away.  If you cannot get there right away, or if it is not safe to take yourself, call 911 or your police emergency number.  2. Call your primary care doctor  Tell your doctor about the emergency. Call within 24 hours of going to the ED.  If you cannot call, have someone call for you.  Go to your doctor (not the ED) for any follow-up care.    When it s not an emergency  If a problem is not an emergency, follow these steps:    1. Call your primary care doctor  If you don t know the name of your doctor, call your health plan.  If you cannot call, have someone call for you.  2. Follow instructions  Your doctor will tell you what you should do.  You may be told to see your doctor right away. You may be told to go to the ED. Or you may be told to go to an urgent care center.  Follow your doctor s advice.      Constipation (Adult)   Constipation means that you have bowel movements that are less frequent than usual. Stools often become very hard and difficult to pass.  Constipation is very common. At some point in life it affects almost everyone. Since everyone's bowel  habits are different, what is constipation to one person may not be to another. Your health care provider may do tests to diagnose constipation. It depends on what he or she finds when evaluating you.  When to seek medical advice  Call your health care provider right away if any of these occur:    Fever over 100.4 F (38 C)    Failure to resume normal bowel movements    Pain in your abdomen or back gets worse    Nausea or vomiting    Swelling in your abdomen    Blood in the stool    Weakness  Call 911  Call 911 if any of these occur:    Trouble breathing    Stiff, rigid abdomen that is severely painful to touch    Confusion    Fainting or loss of consciousness    Rapid heart rate

## 2021-06-09 NOTE — PROGRESS NOTES
Clinic Care Coordination Contact:  Goals addressed this encounter:   Goals Addressed                 This Visit's Progress      Psychosocial (pt-stated)        Goal Statement: I want to be connected with an agency to assist me with citizenship within 1-2 months.  Date Goal set: 03/30/20  Barriers: Language  Strengths: Family support  Date to Achieve By: 5/30/2020  Patient expressed understanding of goal: yes  Action steps to achieve this goal:  1. I and the PCA will continue to wait for [facility] to mail the intake packet or connect with .  2. I will have my PCA, Lev Green to assist with update CCC team on the status of the intake.      [facility]support to apply for Naturalization ([id number])  [address]  [place] [address]  [phone]  Fax: [phone]  Intake Line [phone]    (Updated: 6-)            Intervention/Education provided during outreach: Language line  service is use for this called. Male in-house  name Candis. The Community Health Worker called the pt today regards to CHW/CCC follow up. Spoke with the pt. Introduced self. Explain reason of called. Pt okayed with pca present in the called. Follow up goal. Verified most and urgent needs. Pt and pca reported/shared info below. Refer pt to connect with pcp office for medical appt follow up, prefer pharmacy for med refill, CHW/CCC team for service/resource need and update on citizenship status. Pt and pca confirmed no other questions.     Patient reported;   -doing good.   -no update on citizenship status at this time.   -pca assist with needs, appt, med, and returning phone call.   -no needs at this time.     PCA shared;   -pt is doing good.   -no update for citizenship   -will continue to assist with needs.      Plan:   Care Coordinator will follow up in one month around 7-.

## 2021-06-10 NOTE — PROGRESS NOTES
Clinic Care Coordination Contact    Situation: Patient chart reviewed by care coordinator.    Background: SW completed chart review    Assessment: CHW attempted outreach, did not reach patient    Plan/Recommendations: SW will chart review in 45 days

## 2021-06-10 NOTE — PROGRESS NOTES
8/13/2020   Clinic Care Coordination Contact    Community Health Worker Follow Up    Goals:   Goals Addressed                 This Visit's Progress       Patient Stated      Psychosocial (pt-stated)   20%     Goal Statement: I want to be connected with an agency to assist me with citizenship within 1-2 months.  Date Goal set: 03/30/20  Barriers: Language  Strengths: Family support  Date to Achieve By: 5/30/2020  Patient expressed understanding of goal: yes  Action steps to achieve this goal:  1. PCA -Eh Eh Peter will wait for [facility] to call and connect with  and wait for intake packet packet in the mail.  2. I will have my PCA, Eh Eh Peter to assist with update CCC team on the status of the intake.      [facility] Legal Services ([facility])-support to apply for Naturalization (N-400)  [address]  [place] [address]  [phone]  Fax: [phone]  Intake Line [phone]    Updated: 8-13-20 AL            Called and spoke to patient and PCA Eh Eh Peter ping Willson  [facility] # [id number] Language Line and follow up on goals.  Patient's PCA Eh Eh Peter reported:   -did not get any mail or call from [facility] about citizenship application.    CHW will connect with [facility] to follow up with patient.  Verified patient's phone and address.    CHW Next Follow up 9-16-20  Call [facility] status of referral for citizenship process.        Care taker/PCA Eh Eh Peter did not receive or heard any

## 2021-06-10 NOTE — PROGRESS NOTES
7/31/2020   Clinic Care Coordination Contact  [facility]/Voicemail       Clinical Data: Care Coordinator Outreach  Outreach attempted x 1. Called with Anamika  Manuel  ID [id number] Anamika   left message on patient's voicemail with call back information and requested return call.  Plan: Care Coordinator outreach 8-13-20

## 2021-06-10 NOTE — PROGRESS NOTES
ASSESMENT AND PLAN:  Diagnoses and all orders for this visit:    Insomnia  Improving  Continue trazodone 50 mg at bedtime.    Constipation  -     polyethylene glycol (MIRALAX) 17 gram packet; Take 1 packet (17 g total) by mouth daily as needed.  Dispense: 14 each; Refill: 0    SNHL (sensorineural hearing loss)  Seen by ENT.  Has hearing aid but does not like to wear it.    Loss of appetite  Improving.  Continue multivitamin supplement.      SUBJECTIVE: Teodoro Gregorio is a 77-year-old male with history of chronic insomnia multiple joint pain, hearing loss here for follow-up. She ran out of all his medications about a week ago. He is here with his PCA.    He has hearing aid but PCA reported that he does not like to wear it.     Takes trazodone 50 mg daily for insomnia.  PCA reported that he sleeps well most nights but still having trouble sleeping especially when he has argument with his wife.  Is not living with his wife currently, he lives with his PCA.  He denied depression symptoms. Denies suicidal or homicidal ideations.      His appetite is significantly improved.  He denies nausea, vomiting. He eats 2- 3 meals a day now. He takes multivitamin daily for supplement.     He wears adult diapers sometimes when he has diarrhea.  But he is complaining of constipation states Colace is not working.  He tried someone else's Miralax, which helped.     No new complaint today.          Past Medical History:   Diagnosis Date     Constipation      Hearing loss      Insomnia      Multiple joint pain      TB lung, latent      Patient Active Problem List   Diagnosis     Insomnia     Constipation     Multiple joint pain     Hearing loss     Screening for colon cancer     Soft tissue mass     SNHL (sensorineural hearing loss)     TB lung, latent     Incontinence       Allergies:  No Known Allergies    History   Smoking Status     Former Smoker   Smokeless Tobacco     Current User     Types: Chew     Comment: betel nut       Review of  systems otherwise negative except as listed in HPI.   History   Smoking Status     Former Smoker   Smokeless Tobacco     Current User     Types: Chew     Comment: betel nut       OBJECTICE: /76  Pulse 78  Temp 97.9  F (36.6  C)  Wt 112 lb (50.8 kg)  BMI 21.16 kg/m2          GEN-alert,  in no apparent distress.  HEENT-mucous membranes are moist, neck is supple. Missing teeth.  CV-regular rate and rhythm with no murmur.   RESP-lungs clear to auscultation .  ABDOMEN- Soft , not tender.  EXTREM- no swelling or tenderness.   SKIN-normal        [facility]   4/20/2017   This transcription uses voice recognition software, which may contain typographical errors.

## 2021-06-11 ENCOUNTER — COMMUNICATION - HEALTHEAST (OUTPATIENT)
Dept: CARE COORDINATION | Facility: CLINIC | Age: 81
End: 2021-06-11

## 2021-06-11 NOTE — PROGRESS NOTES
9/17/2020   Clinic Care Coordination Contact    Community Health Worker Follow Up    Goals:   Goals Addressed                 This Visit's Progress       Patient Stated      Psychosocial (pt-stated)   40%     Goal Statement: I want to be connected with an agency to assist me with citizenship within 1-2 months.  Date Goal set: 03/30/20  Barriers: Language  Strengths: Family support  Date to Achieve By: 5/30/2020  Patient expressed understanding of goal: yes  Action steps to achieve this goal:  1. PCA -Eh Eh Peter and I will talk to immigration on 9-22-20 about the citizenship.  2. PCA, Eh Eh Peter will support to update CCC team at next outreach.      [facility] Legal Services [facility]support to apply for Naturalization (N-400)  [address]  [place] [address]  [phone]  Fax: [phone]  Intake Line [phone]    Updated: 9-16-20 AL    Updated: 8-13-20 AL            [facility]  : Mayur   follow up on goals.  Patient's PCA supported to update on goal.   Due to patient hard of hearing.  Spoke to patient's PCA Eh Eh Peter for support to update on citizenship.    PCa reported someone called and schedule christiano ton 9-22-20 to help with citizenship.  She does not know the name of the person  .    CHW Next Follow-up:10-19-20  CHW Plan: schedule re assessment due 10-21-20

## 2021-06-11 NOTE — TELEPHONE ENCOUNTER
RN cannot approve Refill Request    RN can NOT refill this medication med is not covered by policy/route to provider. Last office visit: 1/21/2020 Merary Montes,  Last Physical: 2/19/2020 Last MTM visit: Visit date not found Last visit same specialty: Visit date not found.  Next visit within 3 mo: Visit date not found  Next physical within 3 mo: Visit date not found      Gregoria Mart, [facility] Triage/Med Refill 9/4/2020    Requested Prescriptions   Pending Prescriptions Disp Refills     multivitamin (MULTIVITAMIN) per tablet 45 tablet 0     Sig: Take 1 tablet by mouth daily.       There is no refill protocol information for this order

## 2021-06-11 NOTE — TELEPHONE ENCOUNTER
RN cannot approve Refill Request    RN can NOT refill this medication med is not covered by policy/route to provider. Last office visit: 1/21/2020 Merary Montes DO Last Physical: 2/19/2020 Last MTM visit: Visit date not found Last visit same specialty: 1/21/2020 Merary Montes DO.  Next visit within 3 mo: Visit date not found  Next physical within 3 mo: Visit date not found      Torie Ricks, [facility] Triage/Med Refill 9/4/2020    Requested Prescriptions   Pending Prescriptions Disp Refills     meclizine (ANTIVERT) 25 mg tablet 45 tablet 1     Sig: Take 1 tablet (25 mg total) by mouth 3 (three) times a day as needed for dizziness or nausea.       There is no refill protocol information for this order

## 2021-06-11 NOTE — TELEPHONE ENCOUNTER
Refill Approved    Rx renewed per Medication Renewal Policy. Medication was last renewed on 11/08/2019-fluoxetine.  Last office visit was 02/19/2020 with PCP.    Gregoria Mart, [facility] Triage/Med Refill 9/4/2020     Requested Prescriptions   Pending Prescriptions Disp Refills     FLUoxetine (PROZAC) 20 MG capsule 90 capsule 1     Sig: Take 1 capsule (20 mg total) by mouth daily.       SSRI Refill Protocol  Passed - 9/2/2020  4:43 PM        Passed - PCP or prescribing provider visit in last year     Last office visit with prescriber/PCP: 1/21/2020 Merary Montes DO OR same dept: 1/21/2020 Merary Montes DO OR same specialty: 1/21/2020 Merary Montes DO  Last physical: 2/19/2020 Last MTM visit: Visit date not found   Next visit within 3 mo: Visit date not found  Next physical within 3 mo: Visit date not found  Prescriber OR PCP: Merary Montes DO  Last diagnosis associated with med order: 1. Mild single current episode of major depressive disorder (H)  - FLUoxetine (PROZAC) 20 MG capsule; Take 1 capsule (20 mg total) by mouth daily.  Dispense: 90 capsule; Refill: 1    2. Loss of appetite  - multivitamin (MULTIVITAMIN) per tablet; Take 1 tablet by mouth daily.  Dispense: 45 tablet; Refill: 0    If protocol passes may refill for 12 months if within 3 months of last provider visit (or a total of 15 months).                multivitamin (MULTIVITAMIN) per tablet 45 tablet 0     Sig: Take 1 tablet by mouth daily.       There is no refill protocol information for this order

## 2021-06-11 NOTE — TELEPHONE ENCOUNTER
Refill Request  Did you contact pharmacy: Yes  Medication name:   Requested Prescriptions     Pending Prescriptions Disp Refills     FLUoxetine (PROZAC) 20 MG capsule 90 capsule 1     Sig: Take 1 capsule (20 mg total) by mouth daily.      multivitamin (MULTIVITAMIN) per tablet 1 tablet, Oral, DAILY EditCancel Reorder      Summary: Take 1 tablet by mouth daily., Starting Fri 7/31/2020, Normal   Dose, Frequency: 1 tablet, DAILY  Start: 7/31/2020  Ord/Sold: 7/31/2020 (O)  Report  Adh:   Taking:   Long-term:   Pharmacy: [facility] - [place], [place] - [address] [place] [address]  Med Dose History       Patient Sig: Take 1 tablet by mouth daily.       Ordered on: 7/31/2020       Authorized by: MERARY AMAYA       Dispense: 45 tablet          Who prescribed the medication: Merary Amaya DO   [facility]  Is patient out of medication: NA  Patient notified refills processed in 3 business days:  ASIA  Okay to leave a detailed message: no

## 2021-06-11 NOTE — PROGRESS NOTES
Clinic Care Coordination Contact    Follow Up Progress Note      Assessment: SW assisted Eh Peter (patient's PCA) in completing an intake with [facility] for Teodoro Gregorio. They will be contacting them to continue the next steps.     Goals addressed this encounter:   Goals Addressed                 This Visit's Progress      Psychosocial (pt-stated)        Goal Statement: I want to be connected with an agency to assist me with citizenship within 1-2 months.  Date Goal set: 03/30/20  Barriers: Language  Strengths: Family support  Date to Achieve By: 5/30/2020  Patient expressed understanding of goal: yes  Action steps to achieve this goal:  1. PCA -Eh Eh Peter and I will wait to hear from [facility], intake completed on 10/1/2020  2. PCA, Eh Eh Peter will support to update CCC team at next outreach.      [facility] Legal Services ([facility])-support to apply for Naturalization (N-400)  [address]  [place] [address]  [phone]  Fax: [phone]  Intake Line [phone]    Updated: 10/01/2020               Intervention/Education provided during outreach: See above          Plan:   Standard Outreach

## 2021-06-11 NOTE — PROGRESS NOTES
Clinic Care Coordination Contact  Care Team Conversations     SW contacted patient's PCA, [facility] Peter, to attempt to complete an intake with [facility] today.     [facility] was not open for intakes due to an upgrade to their system.    SW explained this to [facility] Peter and rescheduled.

## 2021-06-11 NOTE — PROGRESS NOTES
ASSESMENT AND PLAN:  Diagnoses and all orders for this visit:    Insomnia  -     traZODone (DESYREL) 100 MG tablet; Take 1 tablet (100 mg total) by mouth at bedtime.  Dispense: 30 tablet; Refill: 5  Increased trazodone to 100 mg from 50 mg.  F/U in 2 months.    Memory difficulty  -     Ambulatory referral to Dementia/Memory Loss Clinic  -     Vitamin B12  -     Syphilis Screen, [facility]  -     CT Head Without Contrast; Future; Expected date: 7/19/17  ??due to depression.     Multiple joint pain  Advised to take Tylenol 650 mg every 6 hours as needed for headaches, back pain and joint pain.    Back pain  Tylenol as above.      Other orders  -     acetaminophen (TYLENOL) 325 MG tablet; Take 2 tablets (650 mg total) by mouth every 6 (six) hours as needed for pain.  Dispense: 90 tablet; Refill: 5        Spent 25 min face to face with patient with more the 50% spent in counseling and coordination of care and discussing problems listed above.      SUBJECTIVE: Teodoro Gregorio is a 77-year-old male with history of depression, insomnia, headaches and back pain here for follow-up.  He is here with his PCA.  He lives with his PCA and her .     He takes trazodone 50 mg daily for insomnia, somewhat helpful but still having problem falling asleep.  He is still up until 2:58 AM some days.  He feels tired during the day.  No nightmare.  He was started on Tegretol for headaches by 1 of my partners here, states is somewhat improving he takes Tegretol 200 mg twice a day.  He takes Tylenol once a day as needed only for headaches.    New concern today is worsening memory problem.  His PCA states that his memory has been getting worse in the past few months.  He forgets to turn off the water after washing hands.  He tends to misplace his belongings.  He does not wander outside the house by himself.  He has significant family problems.  His wife is living with his daughter who is in [place].  Per PCA the daughter refuses to take care of  "the patient.  These memory problems were brought up by his PCA, wondering if he can be reevaluated to increase his PCA hours.  He currently receives 3 hours a day.    Past Medical History:   Diagnosis Date     Constipation      Hearing loss      Insomnia      Multiple joint pain      TB lung, latent      Patient Active Problem List   Diagnosis     Insomnia     Constipation     Multiple joint pain     Hearing loss     Screening for colon cancer     Soft tissue mass     SNHL (sensorineural hearing loss)     TB lung, latent     Incontinence     Back pain     Memory difficulty       Allergies:  No Known Allergies    History   Smoking Status     Former Smoker   Smokeless Tobacco     Current User     Types: Chew     Comment: betel nut       Review of systems otherwise negative except as listed in HPI.   History   Smoking Status     Former Smoker   Smokeless Tobacco     Current User     Types: Chew     Comment: betel nut       OBJECTICE: /62 (Patient Site: Right Arm, Patient Position: Sitting, Cuff Size: Adult Regular)  Pulse 64  Temp 97.7  F (36.5  C) (Oral)   Ht 5' 1\" (1.549 m)  Wt 113 lb 3 oz (51.3 kg)  BMI 21.39 kg/m2    DATA REVIEWED:    Radiology Tests Summarized or Ordered (1):   Labs Reviewed or Ordered (1):       GEN-alert,  in no apparent distress.  Able to answer simple questions.  HEENT-mucous membranes are moist, neck is supple.  CV-regular rate and rhythm with no murmur.   RESP-lungs clear to auscultation .  ABDOMEN- Soft , not tender.  EXTREM- No joint swelling or tenderness.   BACK-lower lumbar paraspinal tenderness, negative straight leg raising test.  NEURO-cranial nerves II through XII intact.        Juan Sethi   7/19/2017   This transcription uses voice recognition software, which may contain typographical errors.      "

## 2021-06-11 NOTE — TELEPHONE ENCOUNTER
Controlled Substance Refill Request  Medication Name:   Requested Prescriptions     Pending Prescriptions Disp Refills     acetaminophen-codeine (TYLENOL-CODEINE #3) 300-30 mg per tablet 45 tablet 0     Sig: Take 1 tablet by mouth every 12 (twelve) hours as needed for pain.     Date Last Fill: 7/31/2020  [facility]acy[facility]  Submit electronically to pharmacy  Controlled Substance Agreement on file:   Encounter-Level CSA Scan Date:    There are no encounter-level csa scan date.        Last office visit:  2/19/20

## 2021-06-12 NOTE — PROGRESS NOTES
My Clinic Care Coordination Wellness Plan  This Graduation Wellness Plan provides private information in regards to the work I have done with my Care Team from my Primary Care Clinic.  This document provides insight on the goals I have accomplished.  My Care Team congratulates me on my journey to maintain wellness.  This document will help guide me on my journey to maintain a healthy lifestyle.  I will use this to help me overcome any barriers I may encounter.  If I should have any questions or concerns, I will continue to contact the members of my Care Team or contact my Primary Care Clinic for assistance.      [facility]  [address] [place]  [address]  [phone]        My Preferred Method of Contact:  Phone: [phone]    My Primary/Preferred Language:  Anamika    Preferred Learning Style:  Pictures/Diagrams and Hands on teaching    Emergency Contact: Extended Emergency Contact Information  Primary Emergency Contact: Windy Clarke   [facility]  Home Phone: [phone]  Mobile Phone: [phone]  Relation: Spouse     PCP:  Juan Sethi MD  Specialists:      Accomplishments:  Goals       COMPLETED: I already received  a cane to walk better. (pt-stated)            Action steps to achieve this goal    Per PCA, patient already received his walker cane.     Update: 9/22/2016  Date goal set: 7/20/2016        COMPLETED: My ENT appointment rescheduled it to 9/2 at 1:00pm and I attended my appt successfully.  (pt-stated)            Action steps to achieve this goal:  1) Per PCA, she took patient  to follow up with ENT appointment on 9/2 at 1:00pm that I missed on 7/25. If patient needs to see ENT in the future, PCA will contact care guide to help schedule for patient.     Update: 9/22/2016  Goal created 8-4-16        COMPLETED: My joint pain and back pain slowly reducing by the medications that  I continue taking.  (pt-stated)            Action steps to achieve this goal  1. There  is still some joint pain and back pain but it manage fine with the medications.  The medications is slowly helping reduce the pain and there  is no major concerns at this time. PCA continue help patient take his medications as prescribed.   2. If patient pain is getting worse PCA will contact care guide or clinic to help schedule with patient PCP.      Update: 2/17/2017   Date goal set: 7/20/2016        COMPLETED: PCA continue hlep coordinated patient medications, appointments and transportations as directed.             Action steps to achieve this goal  1. Per PCA, patient is doing ok. PCA will continue help coordinated patient appointment and medications as scheduled.   2. PCA continue help patient attending  his appointment successfully as scheduled.    3. Per PCA, patient doesn't have any issues with his meds. If patient have any issues with his medications, PCA will contact care guide to notify his PCP or schedule with CCC RN.     4. Per PCA, patient appt  on 1/20 at 2:00pm was cancel and rescheduled on 2/9/2017.      Update: 2/17/2016  Date goal set:7/20/2016            Advanced Directive/Living Will: The patient was given information regarding Adanced Directives/Living Will    Clinical Emergency Plan  Updated emergency care plan for graduation plan.     Emergency Plan Recommendation:     When to Use the Emergency Department (ED)  An emergency means you could die if you don t get care quickly. Or you could be hurt permanently (disabled). Read below to know when to use -- and when not to use -- an emergency department (also called ED).     Dangers to your life  Here are examples of emergencies. These need immediate care:  A hard time breathing  Severe chest pain  Choking  Severe bleeding  Suddenly not able to move or speak  Blacking out (fainting)`  Poisoning     Dangers of permanent injuries  Here are other emergencies. These also need immediate care:  Deep cuts or severe burns  Broken bones, or sudden severe  pain and swelling in a joint     When it s an emergency  If you have an emergency, follow these steps:     1. Go to the nearest emergency department  If you can, go to the hospital ED closest to you right away.  If you cannot get there right away, or if it is not safe to take yourself, call 911 or your police emergency number.  2. Call your primary care doctor  Tell your doctor about the emergency. Call within 24 hours of going to the ED.  If you cannot call, have someone call for you.  Go to your doctor (not the ED) for any follow-up care.     When it s not an emergency  If a problem is not an emergency, follow these steps:     1. Call your primary care doctor  If you don t know the name of your doctor, call your health plan.  If you cannot call, have someone call for you.  2. Follow instructions  Your doctor will tell you what you should do.  You may be told to see your doctor right away. You may be told to go to the ED. Or you may be told to go to an urgent care center.  Follow your doctor s advice.                            All [facility] clinic patients have access to a Nurse 24 hours a day, 7 days a week.  If you have questions or want advice from a Nurse, please know [facility] is here for you.  You can call your clinic and they will connect you or you can call Care Connection at [phone].  [facility] also has Walk In Care clinics in multiple locations.  Call the number listed above for more information about our Walk In Care clinics or visit the [facility] website at [email or web address].

## 2021-06-12 NOTE — PROGRESS NOTES
10/6/2020   Clinic Care Coordination Contact    Community Health Worker Follow Up    Goals:   Goals Addressed                 This Visit's Progress       Patient Stated      Psychosocial (pt-stated)   50%     Goal Statement: I want to be connected with an agency to assist me with citizenship within 1-2 months.  Date Goal set: 03/30/20  Barriers: Language  Strengths: Family support  Date to Achieve By: 5/30/2020  Patient expressed understanding of goal: yes  Action steps to achieve this goal:  1. PCA -Eh Eh Peter and I will talk to [facility] on 10-14-20 at 3pm for support to complete the form from [facility].  2. PCA, Eh Eh Peter will support to update CCC team at next outreach.    [facility] Legal Services ([facility])-support to apply for Naturalization (N-400)  [address]  [place] [address]  [phone]  Fax: [phone]  Intake Line [phone]    Updated: 10/6/2020 AL            Received call from patient's PCA Eh Peter.   Stated patient received form and paperwork from [facility].  Scheduled appt with [facility] 10-14-20 at 3pm for support to fill out the form from [facility] and schedule reassessment      CHW Next Follow-up: 11-13-20  CCC SW 10-14-20 at 3pm  CHW Plan: due for reassessment 10-21-20

## 2021-06-12 NOTE — PROGRESS NOTES
Per patient PCA, patient is doing ok. Patient still have some back pain and joints pain. The medications help with some of patient symptoms. Per PCA continue help coordinated patient appointments and medications as directed. PCA continue take patient out for a walk and helping him to improve his memory by reminding him what to do everyday.  PCA continue encourage patient  to eat healthy and taking enough sleep to help him function daily. If there is questions or concerns about patient health, PCA will contact the clinic for help.     At this time patient PCA reports that patient does not needs refills or forms. Per patient PCA, patient insurance is active.

## 2021-06-12 NOTE — PROGRESS NOTES
Updated emergency care plan for graduation plan.    Emergency Plan Recommendation:    When to Use the Emergency Department (ED)  An emergency means you could die if you don t get care quickly. Or you could be hurt permanently (disabled). Read below to know when to use -- and when not to use -- an emergency department (also called ED).    Dangers to your life  Here are examples of emergencies. These need immediate care:  A hard time breathing  Severe chest pain  Choking  Severe bleeding  Suddenly not able to move or speak  Blacking out (fainting)`  Poisoning    Dangers of permanent injuries  Here are other emergencies. These also need immediate care:  Deep cuts or severe burns  Broken bones, or sudden severe pain and swelling in a joint    When it s an emergency  If you have an emergency, follow these steps:    1. Go to the nearest emergency department  If you can, go to the hospital ED closest to you right away.  If you cannot get there right away, or if it is not safe to take yourself, call 911 or your police emergency number.  2. Call your primary care doctor  Tell your doctor about the emergency. Call within 24 hours of going to the ED.  If you cannot call, have someone call for you.  Go to your doctor (not the ED) for any follow-up care.    When it s not an emergency  If a problem is not an emergency, follow these steps:    1. Call your primary care doctor  If you don t know the name of your doctor, call your health plan.  If you cannot call, have someone call for you.  2. Follow instructions  Your doctor will tell you what you should do.  You may be told to see your doctor right away. You may be told to go to the ED. Or you may be told to go to an urgent care center.  Follow your doctor s advice.

## 2021-06-13 NOTE — PROGRESS NOTES
11/13/2020  Clinic Care Coordination Contact  [facility]/Voicemail  [facility]     Clinical Data: Care Coordinator Outreach  Outreach attempted x 1. Anamika  left message on patient's voicemail with call back information and requested return call.  Plan: Care Coordinatorwijuan try to reach patient again in 10 business days.      CHW Follow up: 11-24-20

## 2021-06-13 NOTE — PROGRESS NOTES
"Clinic Care Coordination Contact  Program: SNAP Renewal   County: [facility] Case #:  [id number]   County Worker: Stephen MACHADO [phone]         Outreach:   12/11/20: Left [facility] a VM. Attempt x 1.   12/4/20: FRW received a call back from [facility]. FRW was able to speak with her, patient received a notice that on 11/10 the [facility] would call to complete a phone interview, didn't receive a call that day. Received a letter that on 12/1 benefits will end. FRW called pt's , Stephen. The VM message stated that he is out of the office for \"a long time\" and to call Susan at [phone] or the supervisor, Stephanie, at [phone]. FRW left Susan a detailed VM and will check in with [facility] next week.       Referral/Screening:   Renew SNAP     Any other information for the FRW?: Received letter from [facility] to renew food stamp/SNAP . talk to [facility] [phone] speak some English.    Around 9am, and call twice.     Goals Addressed:     "

## 2021-06-13 NOTE — TELEPHONE ENCOUNTER
11/16/2020   [facility]'s [facility] reported someone call patient and that he has COVID symptoms and for him to take the COVID test.  PCA stated will call her back to schedule COVID test but has not heard back.    She said the doctor gave him a 5 day prescription to take and that he is feeling better. She is wondering if he still need the do the COVID test.    Patient has nurse visit on 11-25-20 at 3:30pm for blood pressure check.    Please advise.

## 2021-06-13 NOTE — PROGRESS NOTES
"Teodoro Gregorio is a 80 y.o. male who is being evaluated via a billable telephone visit.      The patient has been notified of following:     \"This telephone visit will be conducted via a call between you and your physician/provider. We have found that certain health care needs can be provided without the need for a physical exam.  This service lets us provide the care you need with a short phone conversation.  If a prescription is necessary we can send it directly to your pharmacy.  If lab work is needed we can place an order for that and you can then stop by our lab to have the test done at a later time.    Telephone visits are billed at different rates depending on your insurance coverage. During this emergency period, for some insurers they may be billed the same as an in-person visit.  Please reach out to your insurance provider with any questions.    If during the course of the call the physician/provider feels a telephone visit is not appropriate, you will not be charged for this service.\"    Patient has given verbal consent to a Telephone visit? Yes    What phone number would you like to be contacted at? [phone]    Patient would like to receive their AVS by AVS Preference: Mail a copy.    Additional provider notes:     Subjective: This patient had a virtual visit, telephone, due to the coronavirus pandemic.    This patient's had a 2-day history of congestion cough also has had some fatigue for about a week.    No known exposure to COVID-19.    He is with PCA I am not sure if it is a family member.    They were concerned about possible TB.  The patient had a positive QuantiFERON gold test back in 2016 was treated at [facility] for 9 months.    Has had no weight loss no hemoptysis no fever chills.  No exposure to tuberculosis.    I think the odds of TB is quite low.    Patient has had mild congestion in through the nose and also productive cough sometimes some slight yellowish disc coloration no " hemoptysis.    No myalgia normal smell and taste no GI symptoms of diarrhea or vomiting.  No shortness of breath or chest pressure.    I discussed we need to check COVID-19 self quarantine until results are back.    Also will treat with a azithromycin for the upper respiratory infection.    Push fluids get rest avoid aspirin.    Tobacco status: He  reports that he has quit smoking. His smokeless tobacco use includes chew.    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 30 capsule 0     capsaicin (ZOSTRIX) 0.025 % cream Apply topically 2 (two) times a day. 60 g 1     clotrimazole-betamethasone (LOTRISONE) cream Apply to affected area 2 times daily 15 g 1     FLUoxetine (PROZAC) 20 MG capsule Take 1 capsule (20 mg total) by mouth daily. 90 capsule 1     gabapentin (NEURONTIN) 300 MG capsule Take 2 capsules (600 mg total) by mouth 2 (two) times a day. 120 capsule 2     meclizine (ANTIVERT) 25 mg tablet Take 1 tablet (25 mg total) by mouth 3 (three) times a day as needed for dizziness or nausea. 45 tablet 1     multivitamin (MULTIVITAMIN) per tablet Take 1 tablet by mouth daily. 45 tablet 0     olopatadine (PATANOL) 0.1 % ophthalmic solution Administer 1 drop to both eyes 2 (two) times a day. 10 mL 1     traZODone (DESYREL) 50 MG tablet Take  1 tablet (50 mg total) by mouth at bedtime. 90 tablet 1     triamcinolone (KENALOG) 0.025 % cream Apply thin layer to affected area twice a day 30 g 1     acetaminophen-codeine (TYLENOL-CODEINE #3) 300-30 mg per tablet Take 1 tablet by mouth every 12 (twelve) hours as needed for pain. 45 tablet 0     azithromycin (ZITHROMAX Z-EASTON) 250 MG tablet Take 2 tablets (500 mg) on  Day 1,  followed by 1 tablet (250 mg) once daily on Days 2 through 5. 6 tablet 0     No current facility-administered medications for this visit.        ROS:   10 point review of systems positive as outlined above otherwise negative    Objective:    There were no vitals taken for this visit.  There is no height or weight on file to calculate BMI.      General: No acute distress    HEENT: Some mild congestion through the nose also cough no sore throat no earache no drainage from the ear.    Lungs: Nonlabored breathing, no wheezing.    Has had some wheezing in the past.    He no longer smokes.    Heart: No palpitations or rapid heart rate    Abdomen no swelling or abdominal pain    Extremities no edema no calf redness warmth or tenderness.    We will check COVID-19 PCR    Results for orders placed or performed in visit on 02/19/20   Lipid Cascade, RANDOM   Result Value Ref Range    Cholesterol 208 (H) <=199 mg/dL    Triglycerides 110 <=149 mg/dL    HDL Cholesterol 49 >=40 mg/dL    LDL Calculated 137 (H) <=129 mg/dL    Patient Fasting > 8hrs? No    Glucose   Result Value Ref Range    Glucose 71 (L) 74 - 125 mg/dL    Patient Fasting > 8hrs? No        Assessment:  1. Upper respiratory tract infection, unspecified type  Symptomatic COVID-19 Virus (CORONAVIRUS) PCR    azithromycin (ZITHROMAX Z-EASTON) 250 MG tablet   2. 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  No signs of TB presently    Plan: As discussed above  patient be contacted with results    If any worsening should be evaluated at the emergency room    This transcription uses voice recognition software, which may contain typographical errors.      Phone call duration: 14 minutes, from 3:23 PM through 3:37 PM    Chip Perez MD

## 2021-06-13 NOTE — PROGRESS NOTES
Clinic Care Coordination Contact    Follow Up Progress Note      Assessment: RN CC outreach and spoke with PCA for patient with support of interpretive services.     PCA states that patient is feeling better, but was unclear if COVID test was necessary.  RN CC chart review notes message from PCP on topic  RN CC was able to share message that no test necessary   RN CC reviewed covid precautions and support for  14 day quarantine  PCA verbalized understanding

## 2021-06-13 NOTE — PROGRESS NOTES
"ASSESMENT AND PLAN:  Diagnoses and all orders for this visit:    Back pain  Restarted Tylenol.    Memory difficulty  B12, CBC, thyroid, CMP and syphilis screen were normal.  CT head was ordered in July, has not been done, no scheduled in our  system.  Try to reschedule today.  We will consider referral to memory clinic.  May likely due to depression.  Started on fluoxetine today.  Potential side effects discussed.  Follow-up in 6 weeks.    Insomnia  She reported  trazodone was helping.  Out of it for 1 month.  Refilled today.    Depression  Started on Prozac 20 mg daily.  Follow-up in 6 weeks.    Immunization due  -     Influenza High Dose, Seasonal 65+ yrs  -     Pneumococcal polysaccharide vaccine 23-valent 3 yo or older, subq/IM    Loss of appetite  -     multivitamin with minerals tablet; Take 1 tablet by mouth daily.  Dispense: 30 tablet; Refill: 11    His new PCA will  new prescriptions from pharmacy today.  Advised to bring all pill bottles at next visit.  PCA will help with his medications.      SUBJECTIVE: Teodoro Gregorio is a 77-year-old male with history of depression, insomnia, headaches, back pain and memory problems here for follow-up.  He is here with his new PCA.  He is currently living with his new PCA for about a month now.     He ran out of all his medications about a month ago per his knee PCA..  He forgets to turn off the water after washing hands.  He tends to misplace his belongings.  He does not wander outside the house by himself.  He has significant family problems.  His wife is living with his daughter who is in [place].    He is talking to himself sometimes.  Sometimes he will stare at the iPad and started talking to the iPad.  No reported aggressive behaviors.  He is currently attending adult  3 days a week.    Today he is complaining of back pain which is not new.  He states\" something is moving up and down my back\".  States his leg is improving even without medications for a " "month.  His appetite is still down, but not worsened.    No new significant problems today.    Past Medical History:   Diagnosis Date     Constipation      Hearing loss      Insomnia      Multiple joint pain      TB lung, latent      Patient Active Problem List   Diagnosis     Insomnia     Constipation     Multiple joint pain     Hearing loss     Screening for colon cancer     Soft tissue mass     SNHL (sensorineural hearing loss)     TB lung, latent     Incontinence     Back pain     Memory difficulty       Allergies:  No Known Allergies    History   Smoking Status     Former Smoker   Smokeless Tobacco     Current User     Types: Chew     Comment: betel nut       Review of systems otherwise negative except as listed in HPI.   History   Smoking Status     Former Smoker   Smokeless Tobacco     Current User     Types: Chew     Comment: betel nut       OBJECTICE: /62 (Patient Site: Left Arm, Patient Position: Sitting, Cuff Size: Adult Regular)  Pulse 68  Temp 97.5  F (36.4  C) (Oral)   Ht 5' 1\" (1.549 m)  Wt 118 lb 1 oz (53.6 kg)  BMI 22.31 kg/m2            GEN-alert,  in no apparent distress.  HEENT-mucous membranes are moist, neck is supple.  CV-regular rate and rhythm with no murmur.   RESP-lungs clear to auscultation .  ABDOMEN- Soft , not tender.  BACK- No significant tenderness today.  SKIN-normal        [facility]   10/19/2017   This transcription uses voice recognition software, which may contain typographical errors.      "

## 2021-06-13 NOTE — PROGRESS NOTES
Clinic Care Coordination Contact    Situation: Patient chart reviewed by care coordinator.    Background: SW completed chart review     Assessment: Patient's  at [facility] received completed medical waiver form. FRW attempting to reach patient PCA for support     Plan/Recommendations: LIOR will chart review in 45 days

## 2021-06-13 NOTE — PROGRESS NOTES
11/18/2020   Clinic Care Coordination Contact    Community Health Worker Follow Up    Goals:   Goals Addressed                 This Visit's Progress       Patient Stated      Psychosocial (pt-stated)   80%     Goal Statement: I want to be connected with an agency to assist me with citizenship within 1-2 months.  Date Goal set: 03/30/20  Barriers: Language  Strengths: Family support  Date to Achieve By: 5/30/2020  Patient expressed understanding of goal: yes  Action steps to achieve this goal:  1. PCA will support to attend appt on 11-25-20 at 8:30am at [facility] with Dr Cortez to complete the Medical Waiver Form N-648. Bring green card, social security card, insurance card, [place] ID.    2. [facility] will support to update [facility] SW on 12-1-20 at 9am.    Dr. Cortez's office   Complete Medical Waiver Form N-648  [address]  [place] [address]  Paei [phone]  Fax: [phone]      [facility] ([facility])-support to apply for Naturalization (N-400)  [place],  support with N-400 citizenship application and process.  [address]  [place] [address]  [phone]  Fax: [phone]  Intake Line [phone]    Updated: 11/18/2020 [facility] : Mayur Pollack    Received call from patient's [facility].  Stated she missed call from the clinic and to call back.  She is not sure who called.  Informed PCA that referral fax over to Dr Cortez for support to complete the medical waiver form.    Conference call with PCA and connected with Paei at Dr Cortez's office to schedule appt.  Requested if he could be scheduled same day as his wife on 11-25-20 at 9:45am.  Paei  able to schedule for both on 11-25-20 at 8:30am with Dr Cortez.  PCA confirmed appt and will support to gather document-green cards, [place] ID, social security cards, and insurance cards to bring to appt.    Scheduled appt  with [facility] SW for support and follow up after the medical waiver forms are completed.    Explained to PCA that the original medical waiver goes to the  and patient keeps a copy.      CCC LIOR 12-1-20 at 9am follow up and support     CHW Next Follow-up: 12-21-20     CHW Plan: Follow up on goal

## 2021-06-13 NOTE — TELEPHONE ENCOUNTER
Does not need COVID test however should complete the 2 wk self quarantine to be safe- from the start of his symptoms.

## 2021-06-13 NOTE — PROGRESS NOTES
"11/18/2020  Message from Speciality  Yara Hobbs for medical waiver faxed to Dr Cortez's office  Paei at Dr Cortez will contact patient to schedule appt.    \"I faxed referral to  if you would like to tell pt. To expect a call from Paei that would be great. \"  "

## 2021-06-13 NOTE — PROGRESS NOTES
Clinic Care Coordination Contact  Program: SNAP Renewal   County:  [facility] Case #:  [facility] Worker:   Kane #:   PMI #:   Date Applied:   Renewal Month:       Outreach:   12/2/20:  Outreach attempted x 1. Left message on voicemail with call back information and requested return call.  Plan: FRW will call again within one week.   11/25/20: FRW received a VM from Pending sale to [facility]. FRW returned call and left VM. Attempt x 1.   11/18/20: Outreach attempted x 1. Left message on voicemail with call back information and requested return call.  Plan: FRW will call again within one week.   (VM left with PCA [facility])    Health Insurance:      Referral/Screening:   Renew SNAP     Any other information for the FRW?: Received letter from [facility] to renew food stamp/SNAP . talk to [facility] [phone] speak some English.    Goals Addressed:

## 2021-06-13 NOTE — PROGRESS NOTES
11/16/2020   referral to [facility]  Clinic Care Coordination Contact  Financial Resource Worker Screening     renew food stamp  [facility] Benefits  Is patient requesting help applying for [facility] benefits?: Yes  Have you recently applied for any [facility] benefits?: No  Do you buy/eat food together?: Yes  What is the monthly gross income for the household (wages, social security, workers comp, and pension)? : 750  Is patient requesting an increase in SNAP/CASH?: No    Insurance:  Was [place] verified for active insurance?: No  Is this an insurance renewal?: No  Is this a new insurance application request?: No    Any other information for the [facility]?: Received letter from [facility] to renew food stamp/SNAP . talk to [facility] [phone] speak some English.    Care Coordination team will tell patient:   Thank you for answering all the questions, based on screening questions, our Financial Resource Worker will reach out to you with additional questions and next steps.

## 2021-06-13 NOTE — PROGRESS NOTES
"Clinic Care Coordination Contact  Program: SNAP Renewal   County: [facility] Case #:  [id number]   County Worker: Stephen MACHADO [phone]         Outreach:   12/17/20: FRW checked [employer] info line. SNAP was reinstated. The [facility] received pt's renewal on 12/11. His benefits have been issued for November and December. [facility] team updated.   12/11/20: Left [facility] a VM. Attempt x 1.   12/4/20: FRW received a call back from [facility]. FRW was able to speak with her, patient received a notice that on 11/10 the [facility] would call to complete a phone interview, didn't receive a call that day. Received a letter that on 12/1 benefits will end. FRW called pt's , Stephen. The VM message stated that he is out of the office for \"a long time\" and to call Susan at [phone] or the supervisor, Stephanie, at [phone]. FRW left Susan a detailed VM and will check in with [facility] next week.       Referral/Screening:   Renew SNAP     Any other information for the FRW?: Received letter from [facility] to renew food stamp/SNAP . talk to [facility] [phone] speak some English.    Around 9am, and call twice.     Goals Addressed:     "

## 2021-06-13 NOTE — TELEPHONE ENCOUNTER
PCP Care team please call patient or PCA ([facility] Peter) to relay PCP's  message below in case  patient or PCA has any more questions.  Thank you

## 2021-06-13 NOTE — TELEPHONE ENCOUNTER
Dr. Montes, Medication refill requested. Please authorize medication if appropriate. Thank you.

## 2021-06-13 NOTE — PROGRESS NOTES
11/25/2020  Received call from patient's PCA [facility].  Anamika : [facility] Agency: Language Line  She reported she picked up the medical waiver forms today and she does not know where to send it to.     Explained to [facility] to mail the original medical waiver form the  Tracy Hudson at [facility].  She is not sure what else to send.  Explained that she needs to send copy of green card, social security card, [place] ID, financial income, county benefits, and original medical waiver form.    Stated she will be at [facility] today at 2:40pm and requested to see [facility] LIOR for support.  Explained [facility] LIOR not able to see her in the clinic working remotely due to COVID.     CHW will connect with  staff at [facility] for support to write down [facility] address and make copy of the medical waiver form for PCP to review and scan to chart.        [facility] SW follow up on 12-1-20 at 9am for support if needed.     CHW Follow up: Monthly     CHW Plan: Follow up on goals     CHW Next Follow Up:12-29-20

## 2021-06-13 NOTE — PROGRESS NOTES
Clinic Care Coordination Contact  Program: SNAP Renewal   County:  [facility] Case #:  [facility] Worker:   Kane #:   PMI #:   Date Applied:   Renewal Month:       Outreach:   11/25/20: FRW received a VM from [facility]. FRW returned call and left VM. Attempt x 1.   11/18/20: Outreach attempted x 1. Left message on voicemail with call back information and requested return call.  Plan: FRW will call again within one week.   (VM left with PCA [facility])    Health Insurance:      Referral/Screening:   Renew SNAP     Any other information for the FRW?: Received letter from [facility] to renew food stamp/SNAP . talk to [facility] [phone] speak some English.    Goals Addressed:

## 2021-06-13 NOTE — TELEPHONE ENCOUNTER
11/18/20  Receieved from PCA [facility] Peter.  Seh confirmed the information from PCP.  Stated someone call that he does not need to be tested for COVID.

## 2021-06-13 NOTE — PROGRESS NOTES
11/16/2020   Clinic Care Coordination Contact    Community Health Worker Follow Up    Goals:   Goals Addressed                 This Visit's Progress       Patient Stated      Psychosocial (pt-stated)   70%     Goal Statement: I want to be connected with an agency to assist me with citizenship within 1-2 months.  Date Goal set: 03/30/20  Barriers: Language  Strengths: Family support  Date to Achieve By: 5/30/2020  Patient expressed understanding of goal: yes  Action steps to achieve this goal:  1. PCA will stop by the clinic tomorrow at 9am  to drop off some paperwork and form from [facility] to copy and fax to [facility] SW to review.    2. PCA, [facility] Peter will support to update CCC team at next outreach.    [facility]support to apply for Naturalization (N-400)  [facility],  support with N-400 citizenship application and process.  [address]  [place] [address]  [phone]  Fax: [phone]  Intake Line [phone]    Updated: 11/16/2020 AL            Received call from patient's PCA [facility] Peter.  [facility] : Charleen  She reported:  -he had symptoms of COVID-runny nose, cough. Someone call to take  the COVID test but they did not call back to schedule for the test.   Stated he took the 5 days  prescription prescribed by the doctor and that he seems to fill better.  She is wondering if he still need to test for COVID.    CHW sent message to PCP Care Team regarding COVID test.    Reminded PCA that he has appt nurse at the clinic on 11-25-20 at 3:30pm to check blood pressure.    -received return forms/paperwork from [facility] not sure what to do.  PCA will drop off the paperwork/form at the clinic tomorrow 9am to make copy and fax to [facility] SW/CHW.  -appt on 11-30-20 for medical waiver at 10am on [facility].  -need to help to renew food stamp with [facility].    Scheduled appt with [facility] RN 11-17-20 3 pm for support regarding COVID  testing      Called and spoke to Ivon Chacko and Dr Cortez's office to verify/clarify appt for medical waiver form.  They reported there is no appt scheduled for patient on 11-30-20.    Called and spoke to PCA again to clarify appt for medical waiver form and location.  PCA reported it is for patient's wife (Jennifer Ohara) she has appt 11-25-20 with Ivon for the medical waiver not the patient.  She is not sure of the address and location of the place it is on [facility].  Patient does not have any appt for medical waiver   Stated the 11-30-20 appt was for her daughter.    Will check referral for medical waiver with PCP and  try to get the medical waiver form done at the same place as wife.    Sent referral to [facility] for support with renewing food stamp.    CCC RN appt on 11-17-20 at 3pm.    CHW Follow up: 12-17-20

## 2021-06-14 NOTE — PROGRESS NOTES
12/23/2020  Clinic Care Coordination Contact  [facility]/Voicemail       Clinical Data: Care Coordinator Outreach  Outreach attempted x 1.  Left message on patient's voicemail with call back information and requested return call.  Plan: Care Coordinator  will try to reach patient again in 10 business days.      CHW Follow up: 1-6-21

## 2021-06-14 NOTE — PROGRESS NOTES
Clinic Care Coordination Contact    Situation: Patient chart reviewed by care coordinator.    Background: SW completed chart review    Assessment: [facility], waiting to hear from them.     Plan/Recommendations: Standard Outreach

## 2021-06-14 NOTE — PROGRESS NOTES
1/6/2021   Clinic Care Coordination Contact    Community Health Worker Follow Up    Goals:   Goals Addressed                 This Visit's Progress       Patient Stated      COMPLETED: Psychosocial (pt-stated)   100%     Goal Statement: I have connected with  at [facility] for support with citizenship application and process.   Date Goal set: 03/30/20  Barriers: Language  Strengths: Family support  Date to Achieve By: 5/30/2020 Completed 1--6-21  Patient expressed understanding of goal: yes  Personal Plan:   PCA?niece Eh Eh Peter will continue to support and connect with Tracy [facility] ([facility]) [phone] to complete citizenship application and process.      [facility] Legal Services ([facility])-support to apply for Naturalization (N-400)  [address]  [place] [address]  [phone]  Fax: [phone]  Intake Line [phone]    Dr. Cortez's office   Complete Medical Waiver Form [id number]  [address]  [place] [address]  Paei [phone]  Fax: [phone]                    [facility] : Kadeem Boo    Called and spoke to patient's patient PCA (Eh Eh Peter)/niece  through An[employer]  and follow up on goal.  Patient's PCA/niece updated on goal.    -she sent every necessary document-financial to the  [place].  -just waiting to hear from the  to tell her what is the next process and steps.     Goal completed.  PCA/niece will support to connect with immigration of next step and continue to support patient.      CHW Next Follow-up: Monthly    CHW Plan: follow up on next month to discuss transition to maintenance if no other goals or needs    CHW Follow up: 2-5-21

## 2021-06-15 NOTE — PROGRESS NOTES
2/4/2021  [facility] An[facility] : Kadeem Boo    Called and spoke to patient's niece/care giver through [employer] :   follow up if he has any other goals or needs.  Patient's niece/care giver  reported:   -he has issues with hearing and that he has hearing aid but he does not want to wear. Stated it makes loud noises.  She is worry if he can't hear when if someone can go with him if has the citizenship interview.  Niece encouraged to wear it but he refused. She took him to see audiology to have hearing adjusted and then takes it out and moved it around.   He reluctant to go see audiologist again because he feels it was not helping.  Offered to talk to PCP.   Stated he refused to go.    Reinforced and encouraged to see audiologist again to get the hearing adjusted and fixed so he can hear better to prepare for citizenship interview.  Sally will supportl to schedule ENT appt to see audiologist when he is ready.    -she is not able to copy of the 2021 social security statement. SSA closed. She will have wait for it in th email.  She submit all the document to [facility] SW and SW submitted to the  [facility].    CHW sent message to Teri to clarify document and information with citizenship application.  Sally have Tracy contact number.      CHW Follow up: Monthly    CHW Plan: Follow up on goals    CHW Next Follow Up: 3-5-21

## 2021-06-15 NOTE — PROGRESS NOTES
3/8/2021   Per [facility] SW connected with patient on 2-23-21 gathered all information to complete the citizenship application and sent to Tracy Hudson at [facility].  CHW deferred outreach to 3-24-21       CHW Follow up: Monthly    CHW Plan: Follow up on goals    CHW Next Follow Up: 3-24-21

## 2021-06-15 NOTE — PROGRESS NOTES
Clinic Care Coordination Contact    Follow Up Progress Note      Assessment: [facility] SW spoke with [facility] Peter with an  to review the additional information needed for [facility].    This is the information sent to [facility] for now:    Here is what I was able to find out so far:    Teodoro ogden child, May [place] is a citizen so there is no A#    [facility] Peter (Teodoro ogden PCA) is going to work on getting their  s and addresses.    Teodoro Gregorio s marriage with [facility] was a cultural marriage in [facility]. They don t know the date and there is no documentation. They had official separate addresses in 2019, but [facility] Peter reported they had been  about 2-3 years prior to 2019, living temporarily with separate family members.     The address for the [place] residence was:    [address] [place] [address], [place] [address]    Not sure of dates when they lived there though.    She gave me a number for a  he was connected to, no name, [phone].    They haven t received an SSI statement yet. [facility] Peter will let us know when they do.       Goals addressed this encounter:   Goals Addressed                 This Visit's Progress      Problem Solving (pt-stated)        Goal Statement: I want to submit all necessary information for my citizenship process within 1-2 months  Date Goal set: 21  Barriers:   Strengths:   Date to Achieve By: 2021  Patient expressed understanding of goal: Yes  Action steps to achieve this goal:  1. I will submit my Social Security statement when I receive it to [facility]  2. I will try to get my children's addresses, , and A#'s to submit to [facility]   3. I will update CCC team                Intervention/Education provided during outreach: See above     Outreach Frequency: monthly    Plan:   Standard Outreach, [facility] Peter will try to find the additional information needed

## 2021-06-16 PROBLEM — K08.9 POOR DENTITION: Status: ACTIVE | Noted: 2020-02-19

## 2021-06-16 PROBLEM — R41.3 MEMORY DIFFICULTY: Status: ACTIVE | Noted: 2017-07-19

## 2021-06-16 PROBLEM — F32.0 MILD SINGLE CURRENT EPISODE OF MAJOR DEPRESSIVE DISORDER (H): Status: ACTIVE | Noted: 2018-07-20

## 2021-06-16 PROBLEM — G89.29 CHRONIC BILATERAL LOW BACK PAIN WITHOUT SCIATICA: Status: ACTIVE | Noted: 2017-07-19

## 2021-06-16 PROBLEM — M54.50 CHRONIC BILATERAL LOW BACK PAIN WITHOUT SCIATICA: Status: ACTIVE | Noted: 2017-07-19

## 2021-06-16 NOTE — TELEPHONE ENCOUNTER
Controlled Substance Refill Request  Medication Name:   Requested Prescriptions     Pending Prescriptions Disp Refills     multivitamin (MULTIVITAMIN) per tablet 45 tablet 0     Sig: Take 1 tablet by mouth daily.     acetaminophen-codeine (TYLENOL-CODEINE #3) 300-30 mg per tablet 45 tablet 0     Sig: Take 1 tablet by mouth every 12 (twelve) hours as needed for pain.     benzonatate (TESSALON PERLES) 100 MG capsule 30 capsule 0     Sig: Take 1 capsule (100 mg total) by mouth every 6 (six) hours as needed for cough.     Date Last Fill: 11/27/20  [facility]ed [facility]acy[facility]  Submit electronically to pharmacy  Controlled Substance Agreement on file:   Encounter-Level CSA Scan Date:    There are no encounter-level csa scan date.        Last office visit:  11/10/21  Carlotta Yanes RN, MA  [facility]    Triage Nurse Advisor

## 2021-06-16 NOTE — TELEPHONE ENCOUNTER
Telephone Encounter by Pam Dao at 5/9/2019  3:27 PM     Author: Pam Dao Service: -- Author Type: --    Filed: 5/9/2019  3:30 PM Encounter Date: 5/5/2019 Status: Signed    : Pam Dao       PRIOR AUTHORIZATION DENIED    Denial Rational:  Diagnosis provided of dry eye is not a FDA approved use.  If patient did have a supported use they must have also tried/failed Cromolyn 4% opth soln, Azelastine 0.05% opth soln, and Over the counter Ketotifen or Alaway 0.025% opth soln.       Appeal Information: This medication was denied. If physician would like to appeal because patient has contraindication or allergy to covered medication please write letter of medical necessity and route back to PA team to initiate.  If no further action is needed please close encounter thank you.

## 2021-06-16 NOTE — TELEPHONE ENCOUNTER
3/24/2021  Patient reported out of refills for the following medications:  Multivitamin  Tylenol Codeine  Benzonatate -for dizziness     Niece spoke to pharmacy but that they have not hear back from the clinic.

## 2021-06-16 NOTE — PROGRESS NOTES
Clinic Care Coordination Contact    Situation: Patient chart reviewed by care coordinator.    Background: SW completed chart review    Assessment: Patient had an appointment with [facility] on 3/10/2021 to complete paperwork with Tracy. Still progressing on goal    Plan/Recommendations: Standard Outreach

## 2021-06-16 NOTE — PROGRESS NOTES
Subjective:    Teodoro Gregorio is a 81 y.o. male who presents with chief complaint of dizziness.  He and his wife are here today with the same concerns.  Symptoms have been going on for about 1 week.  They both started with dizziness at the same time.  Symptoms are similar.  They have arthritis pain.    Granddaughter is here with him today and does most of the talking during visit.  She feels like his mood is normal.  No fevers or coughing.  Appetite can fluctuate, but is overall normal.  Initially she said he is up-to-date on all of his medicines.  When I asked if he was taking his meclizine, she says he is run out of this.    Of note, patient declines Covid vaccine.  Family states they have discussed the importance of getting this with him and he still declines.  I strongly encouraged him to get this.  He will notify us if he would like this done.    Patient Active Problem List   Diagnosis     Insomnia     Multiple joint pain     Hearing loss     Soft tissue mass     SNHL (sensorineural hearing loss)     Incontinence     Chronic bilateral low back pain without sciatica     Memory difficulty     Mild single current episode of major depressive disorder (H)     Poor dentition       Current Outpatient Medications:      acetaminophen-codeine (TYLENOL-CODEINE #3) 300-30 mg per tablet, Take 1 tablet by mouth every 12 (twelve) hours as needed for pain., Disp: 45 tablet, Rfl: 0     azelastine (OPTIVAR) 0.05 % ophthalmic solution, Use one drop in eye daily, Disp: 6 mL, Rfl: 12     FLUoxetine (PROZAC) 20 MG capsule, Take 1 capsule (20 mg total) by mouth daily., Disp: 90 capsule, Rfl: 1     gabapentin (NEURONTIN) 300 MG capsule, Take 2 capsules (600 mg total) by mouth 2 (two) times a day., Disp: 120 capsule, Rfl: 2     multivitamin (MULTIVITAMIN) per tablet, Take 1 tablet by mouth daily., Disp: 45 tablet, Rfl: 0     olopatadine (PATANOL) 0.1 % ophthalmic solution, Administer 1 drop to both eyes 2 (two) times a day., Disp: 10 mL,  Rfl: 1     traZODone (DESYREL) 50 MG tablet, Take 1 tablet (50 mg total) by mouth at bedtime., Disp: 90 tablet, Rfl: 1     benzonatate (TESSALON PERLES) 100 MG capsule, Take 1 capsule (100 mg total) by mouth every 6 (six) hours as needed for cough., Disp: 30 capsule, Rfl: 0     capsaicin (ZOSTRIX) 0.025 % cream, Apply topically 2 (two) times a day., Disp: 60 g, Rfl: 1     clotrimazole-betamethasone (LOTRISONE) cream, Apply to affected area 2 times daily, Disp: 15 g, Rfl: 1     meclizine (ANTIVERT) 25 mg tablet, Take 1 tablet (25 mg total) by mouth 3 (three) times a day as needed for dizziness or nausea., Disp: 45 tablet, Rfl: 1     triamcinolone (KENALOG) 0.025 % cream, Apply thin layer to affected area twice a day, Disp: 30 g, Rfl: 1     Objective:   Allergies:  Patient has no known allergies.    Vitals:  Vitals:    04/22/21 1401   BP: 116/64   Patient Site: Left Arm   Patient Position: Sitting   Cuff Size: Adult Small   Pulse: 66   Resp: 12   Temp: 96.9  F (36.1  C)   TempSrc: Temporal   Weight: 126 lb 0.6 oz (57.2 kg)     Body mass index is 23.82 kg/m .    Vital signs reviewed.  General: Patient is alert and oriented x 3, in no apparent distress, alert and calm, in no apparent distress  Cardiac: regular rate and rhythm, no murmurs  Pulmonary: lungs clear to auscultation bilaterally, no crackles, rales, rhonchi, or wheezing noted      Assessment and Plan:   1. Dizziness  No new symptoms.  Worsened since he ran out of meclizine.  No recent falls or injuries.  Exam reassuring.  He will contact us if dizziness persists once meclizine is re-started.  - meclizine (ANTIVERT) 25 mg tablet; Take 1 tablet (25 mg total) by mouth 3 (three) times a day as needed for dizziness or nausea.  Dispense: 45 tablet; Refill: 1    2. Eczema  Stable on current medication.  - triamcinolone (KENALOG) 0.025 % cream; Apply thin layer to affected area twice a day  Dispense: 30 g; Refill: 1    3. Rash of neck  Stable.  Medication refilled.  -  clotrimazole-betamethasone (LOTRISONE) cream; Apply to affected area 2 times daily  Dispense: 15 g; Refill: 1    4. Chronic pain of left knee  Well controlled with this medication, refilled today.  - capsaicin (ZOSTRIX) 0.025 % cream; Apply topically 2 (two) times a day.  Dispense: 60 g; Refill: 1    5. Dry eyes  Stable on present medication.  - azelastine (OPTIVAR) 0.05 % ophthalmic solution; Use one drop in eye daily  Dispense: 6 mL; Refill: 12    This dictation uses voice recognition software, which may contain typographical errors.

## 2021-06-16 NOTE — TELEPHONE ENCOUNTER
Telephone Encounter by Viktor Khan at 2/5/2020 12:22 PM     Author: Viktor Khan Service: -- Author Type: --    Filed: 2/5/2020 12:27 PM Encounter Date: 1/29/2020 Status: Signed    : Viktor Khan       These are the medications that are covered under the patient's plan:    If the provider does not want to change to a covered option, they may route back to [facility] MED ([id number]) and a Prior Authorization can be submitted for the prescribed product.

## 2021-06-16 NOTE — PROGRESS NOTES
ASSESMENT AND PLAN:  Diagnoses and all orders for this visit:    Back pain  -     acetaminophen (TYLENOL) 325 MG tablet; Take 1 tablet (325 mg total) by mouth every 4 (four) hours as needed for pain.  Dispense: 90 tablet; Refill: 5  -     gabapentin (NEURONTIN) 300 MG capsule; Take 1 capsule (300 mg total) by mouth 2 (two) times a day.  Dispense: 60 capsule; Refill: 3    Loss of appetite  -     multivitamin with minerals tablet; Take 1 tablet by mouth daily.  Dispense: 30 tablet; Refill: 11  ~2 pounds weight gain since last visit.    Depression  -     FLUoxetine (PROZAC) 20 MG capsule; Take 1 capsule (20 mg total) by mouth daily.  Dispense: 30 capsule; Refill: 5    Eczema  -     triamcinolone (KENALOG) 0.025 % cream; Apply thin layer to affected area twice a day  Dispense: 30 g; Refill: 1    SNHL (sensorineural hearing loss)  He has hearing aid but does not like to use it.    He will follow-up in 3 months for medications review.    SUBJECTIVE: Teodoro Gregorio  is a 77-year-old male with history of depression, insomnia, headaches, back pain and memory problems here for follow-up.  He is here with his new PCA.  He is currently living with his PCA who is living with him.      He ran out of all his medications a week ago per PCA. ( they did not know he has refills ). He has significant memory problems.  He was referred to memory clinic twice in the past.  He was not able to keep his appointment.  No worsening memory problems per PCA.  PCA witnesses him crying for no reason, that is not new.  Denied visual or auditory hallucinations.  Denied aggressive behaviors.  His wife is still living with his daughter but they see each other at  and the wife visits him sometimes.  He goes to adult  3 days a week.    His main concern today is the rash on the back of his neck.  He was given topical steroid cream in the past, which helped but never went away.  It is itchy but not painful.  He has this for several years.    Past  "Medical History:   Diagnosis Date     Constipation      Hearing loss      Insomnia      Multiple joint pain      TB lung, latent      Patient Active Problem List   Diagnosis     Insomnia     Constipation     Multiple joint pain     Hearing loss     Screening for colon cancer     Soft tissue mass     SNHL (sensorineural hearing loss)     TB lung, latent     Incontinence     Back pain     Memory difficulty       Allergies:  No Known Allergies    History   Smoking Status     Former Smoker   Smokeless Tobacco     Current User     Types: Chew     Comment: betel nut       Review of systems otherwise negative except as listed in HPI.   History   Smoking Status     Former Smoker   Smokeless Tobacco     Current User     Types: Chew     Comment: betel nut       OBJECTICE: /80  Pulse 63  Temp 97.4  F (36.3  C) (Oral)   Resp 12  Ht 5' 1\" (1.549 m)  Wt 120 lb 4 oz (54.5 kg)  SpO2 95%  BMI 22.72 kg/m2          GEN-alert,  in no apparent distress.  HEENT-mucous membranes are moist, neck is supple.  CV-regular rate and rhythm with no murmur.   RESP-lungs clear to auscultation .  ABDOMEN- Soft , not tender.  BACK- Mild lower lumbar tenderness, Negative SLR test.   SKIN-normal    This transcription uses voice recognition software, which may contain typographical errors.        Juan Sethi   2/16/2018   "

## 2021-06-17 NOTE — PATIENT INSTRUCTIONS - HE
Patient Instructions by Roosevelt Vail PT at 8/27/2019  2:00 PM     Author: Roosevelt Vail PT Service: -- Author Type: Physical Therapist    Filed: 8/27/2019  2:30 PM Encounter Date: 8/27/2019 Status: Signed    : Roosevelt Vail PT (Physical Therapist)           HAMSTRING STRETCH - SUPINE    While lying on your back, raise up your leg and hold the back of your knee until a stretch is felt.    Move your ankle back and forth.  10x each leg. 2 sets.    2x/day.      SIT TO STAND    While making sure you bend at the hip, SLOWLY sit down    Your chest may come forward over your toes, but your spine should stay in neutral as shown.    Do 15x.  2x/day.                 Area L Indication Text: Tumors in this location are included in Area L (trunk and extremities).  Mohs surgery is indicated for larger tumors, or tumors with aggressive histologic features, in these anatomic locations.

## 2021-06-17 NOTE — PATIENT INSTRUCTIONS - HE
"Patient Instructions by Roosevelt Vail PT at 8/8/2019  2:30 PM     Author: Roosevelt Vail PT Service: -- Author Type: Physical Therapist    Filed: 8/8/2019  2:58 PM Encounter Date: 8/8/2019 Status: Signed    : Roosevelt Vail PT (Physical Therapist)        SINGLE KNEE TO CHEST STRETCH - SKTC    While Lying on your back,  hold your knee and gently pull it up towards your chest.    Hold 30 seconds. 2x each leg.  2x/day.      LOWER TRUNK ROTATIONS - LTR    Lying on your back with your knees bent, gently move your knees side-to-side.    Hold 2 seconds. 5-10x each side.  2x/day.      PELVIC TILT    While lying on your back, use your stomach muscles to press your spine downwards towards the ground. Do not move into a painful position.    Hold 3 seconds. 10x.  2x/day.      BRIDGING    While lying on your back, tighten your lower abdominals, squeeze your buttocks and then raise your buttocks off the floor/bed as creating a \"Bridge\" with your body.    Hold 3 seconds. 10x.  2x/day.            "

## 2021-06-17 NOTE — TELEPHONE ENCOUNTER
Medication Request  Medication name: tylenol #3  Requested [facility]  Reason for request: Refill  When did you use medication last?:  Unknown   Patient offered appointment:  N/A - electronic request  Okay to leave a detailed message: no

## 2021-06-17 NOTE — PROGRESS NOTES
Clinic Care Coordination Contact    Situation: Patient chart reviewed by care coordinator.    Background: CHW messaged SW for maintenance review    Assessment: Patient completed goals    Plan/Recommendations: SW moved patient to maintenance, SW will chart review in 45 days, CHW to do standard outreach

## 2021-06-17 NOTE — PATIENT INSTRUCTIONS - HE
Patient Instructions by Roosevelt Vail PT at 9/5/2019 11:00 AM     Author: Roosevelt Vail PT Service: -- Author Type: Physical Therapist    Filed: 9/5/2019 11:30 AM Encounter Date: 9/5/2019 Status: Signed    : Roosevelt Vail PT (Physical Therapist)        STANDING MARCHING    While standing, draw up your knee, set it down and then alternate to your other side.    Use your arms for support if needed for balance and safety.     HIP ABDUCTION - STANDING     While standing, raise your leg out to the side. Keep your knee straight and maintain your toes pointed forward the entire time.      Use your arms for support if needed for balance and safety.     STANDING HAMSTRING CURLS    While standing, bend your knee so that your heel moves towards your buttock. Lower back down until first contact with floor and repeat.   Keep knees in-line with one another.      STANDING HEEL RAISES    While standing, raise up on your toes as you lift your heels off the ground,  Then go back on your heels and lift your toes off the ground.     Try not to hold on unless needed.  Do 15x each leg, each exercise.  2x/week.

## 2021-06-17 NOTE — PROGRESS NOTES
4/26/2021  Clinic Care Coordination Contact    Community Health Worker Follow Up    Goals:   Goals Addressed                 This Visit's Progress       Patient Stated      COMPLETED: Other (pt-stated)   100%     Goal Statement: I have established care with ENT audiologist to address hearing issues or issues with hearing aid.  Date Goal set: 2/4/2021  Barriers: language, hear loss  Strengths: niece/care guiver  Date to Achieve By:Completed  Patients niece expressed understanding of goal: Yes  Personal Plan:   Niece/care giver/PCA (  Eh Peter will support to schedule appt to see Audiologist to get hearing aid adjusted if needed in the future.    [facility]  [phone]               COMPLETED: Problem Solving (pt-stated)   100%     Goal Statement: I completed the citizenship application with [facility].    Date Goal set: 02/23/21  Barriers: language  Strengths: sup[ort from [facility]  Date to Achieve By: 4/30/2021  Patient expressed understanding of goal: Yes  Personal Plan     Eh Peter/PCA/Care Giver will continue to support and help check the mail any letters from immigration office for interview date.    Eh Peter will contact nancy Molina at [facility] if she has any questions or received any letters from immigration office.    [facility] [employer] ([facility])-support to apply for Naturalization (N-400)  [address]  [place] [address]  [phone]  Fax: [phone]  Intake Line [phone]             Anamika : Consuelo ID #[id number]  Called and spoke to patient's niece [facility] Peter and follow up on goals.  Patient's niece reported:  -received copy of citizenship from [employer].  Informed her that  sent out the application to immigration office and to wait to received any notices from immigration office.  -does not want to go adjust his hearing aid.  If he does want to get it adjusted she will call to ENT to schedule appt to see audiologist.  -got COVID vaccine and  2nd one is on 5-22-21.    Completed both goals.  Discussed if there any other goals or needs before transition to maintenance.  Lev Green stated okay to follow up in 2 months and if she has questions she will call CHW for support.    Patient has support from twyla Green and [facility] care coordinator.    Patient has completed all goals with Clinic Care Coordination.    Routed to [facility] SW to review the chart and confirm if maintenance  is approved.      CHW Follow up: 2 Months    CHW Plan: Discuss graduating from [facility] if no other goals or needs    CHW Next Follow Up: 6-25-21

## 2021-06-17 NOTE — PATIENT INSTRUCTIONS - HE
Patient Instructions by Roosevelt Vail PT at 9/3/2019 11:00 AM     Author: Roosevelt Vail PT Service: -- Author Type: Physical Therapist    Filed: 9/3/2019 11:29 AM Encounter Date: 9/3/2019 Status: Signed    : Roosevelt Vail PT (Physical Therapist)        BRACE MARCHING    While holding the crunch, alternate marching, 10-15x each leg. 3 sets.    1-2x/day.

## 2021-06-17 NOTE — PROGRESS NOTES
4/26/2021  Clinic Care Coordination Contact  Patient has completed all goals with Clinic Care Coordination.  Please review the chart and confirm if maintenance  is approved.

## 2021-06-18 NOTE — PROGRESS NOTES
ASSESMENT AND PLAN:  Diagnoses and all orders for this visit:    Back pain  Chronic, not severe.  Advised to take Neurontin 300 mg twice a day (not once a day)    Depression  Stable.    SNHL (sensorineural hearing loss)  Has hearing aid but does not like to wear it.    Loss of appetite  Improving.  5 pounds weight gain since last visit 2 months ago.    No medication changes today.  Follow-up in 3 months.         SUBJECTIVE: Teodoro Gregorio  is a 77-year-old male with history of depression, insomnia, headaches, back pain and memory problems here for follow-up.  He is here with his new PCA.      PCA is helping him with his medications.  No new problems or concerns today but states his back pain is not improving much.  He was prescribed Neurontin to take 300 mg twice a day but he is taking it only once a day.        He has significant memory problems.  He was referred to memory clinic twice in the past.  He was not able to keep his appointment.  No worsening memory problems per PCA.  PCA witnesses him crying for no reason, that is not new.  Denied visual or auditory hallucinations.  Denied aggressive behaviors.   He goes to adult  3 days a week.    Past Medical History:   Diagnosis Date     Constipation      Hearing loss      Insomnia      Multiple joint pain      TB lung, latent      Patient Active Problem List   Diagnosis     Insomnia     Constipation     Multiple joint pain     Hearing loss     Screening for colon cancer     Soft tissue mass     SNHL (sensorineural hearing loss)     TB lung, latent     Incontinence     Back pain     Memory difficulty       Allergies:  No Known Allergies    History   Smoking Status     Former Smoker   Smokeless Tobacco     Current User     Types: Chew     Comment: betel nut       Review of systems otherwise negative except as listed in HPI.   History   Smoking Status     Former Smoker   Smokeless Tobacco     Current User     Types: Chew     Comment: betel nut       OBJECTICE: BP  "102/70 (Patient Site: Right Arm, Patient Position: Sitting, Cuff Size: Adult Regular)  Pulse 80  Temp 97.4  F (36.3  C) (Oral)   Resp 16  Ht 5' 1\" (1.549 m)  Wt 125 lb 4 oz (56.8 kg)  BMI 23.67 kg/m2      GEN-alert,  in no apparent distress.  HEENT-mucous membranes are moist, neck is supple.  CV-regular rate and rhythm with no murmur.   RESP-lungs clear to auscultation .  ABDOMEN- Soft , not tender.  SKIN-normal    This transcription uses voice recognition software, which may contain typographical errors.        Juan Sethi   5/18/2018   "

## 2021-06-19 NOTE — PROGRESS NOTES
ASSESMENT AND PLAN:  Diagnoses and all orders for this visit:    Back pain  -     gabapentin (NEURONTIN) 300 MG capsule; Take one capsule in the morning and 2 capsules in the evening  Dispense: 90 capsule; Refill: 3  Increased Neurontin to 300 mg 1 in the morning and 2 at night.  Follow-up in 3 months.    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  Continue Prozac 20 mg daily.    Hearing loss  Was referred to ENT.  He has hearing aid but refuses to wear it.    Loose, teeth  Does not want dentures.  States he has 5 teeth that are working fine.  Seen by dentist already.    Loss of appetite  Weight is stable.      SUBJECTIVE: Teodoro Gregorio is an 78-year-old male with history of depression and ongoing back pain here for follow-up.  He is here with his PCA and professional .  He lives with PCA.  He is on Neurontin 300 mg twice a day and Tylenol as needed for his back pain.  PCAs is helping him with medications and states he is taking all medications as prescribed.  He is also on Prozac 20 mg daily and multivitamin supplement.  No new complaints today, only concerns about his back pain.  States Neurontin is helping but still feeling the pain most day.  No injury to the back.  The pain does not radiate.  He is also having bilateral knee pain, which is not new.  Client x-ray and physical therapy in the past.  He has occasional tingling and numbness sensation in the feet.  Mood is stable per PCA.  He denied suicidal homicidal ideations.    Past Medical History:   Diagnosis Date     Constipation      Hearing loss      Insomnia      Multiple joint pain      TB lung, latent      Patient Active Problem List   Diagnosis     Insomnia     Multiple joint pain     Hearing loss     Soft tissue mass     SNHL (sensorineural hearing loss)     TB lung, latent     Incontinence     Back pain     Memory difficulty       Allergies:  No Known Allergies    History   Smoking Status     Former Smoker   Smokeless  "Tobacco     Current User     Types: Chew     Comment: betel nut       Review of systems otherwise negative except as listed in HPI.   History   Smoking Status     Former Smoker   Smokeless Tobacco     Current User     Types: Chew     Comment: betel nut       OBJECTICE: /66 (Patient Site: Left Arm, Patient Position: Sitting, Cuff Size: Adult Regular)  Pulse 68  Temp 97.7  F (36.5  C) (Oral)   Resp 20  Ht 5' 1\" (1.549 m)  Wt 125 lb 5 oz (56.8 kg)  BMI 23.68 kg/m2          GEN-alert,  in no apparent distress.  HEENT-mucous membranes are moist, neck is supple. Missing teeth.  CV-regular rate and rhythm with no murmur.   RESP-lungs clear to auscultation .  ABDOMEN- Soft , not tender.  EXTREM- No edema. No joint swelling or tenderness.   BACK- No tenderness on palpation. Negative SLR test.   SKIN-normal      This transcription uses voice recognition software, which may contain typographical errors.      Juan Sethi   7/20/2018     "

## 2021-06-19 NOTE — LETTER
Letter by Gary Giang LGSW at      Author: Gary Giang LGSW Service: -- Author Type: --    Filed:  Encounter Date: 10/21/2019 Status: Signed       Care Plan  About Me:    Patient Name:  Teodoro Gregorio    YOB: 1940  Age:         79 y.o.   [facility] MRN:    [id number] Telephone Information:  Home Phone [phone]   Mobile [phone]       Address:  [address]  [place] [address] Email address:  No e-mail address on record      Emergency Contact(s)  Extended Emergency Contact Information      Name: Peter, Eh Eh    Relation: Other      Name: Tricia,Day  Address:       [address]      [place] [address]  Home Phone Number: [phone]  Relation: Child          Primary language:  Anamika     needed? Yes   [facility]:  [phone] op. 1  Other communication barriers: Language barrier  Preferred Method of Communication:     Current living arrangement: I live in a private home  Mobility Status/ Medical Equipment: Independent w/Device    Health Maintenance  Health Maintenance Reviewed: Not assessed    My Access Plan  Medical Emergency 911   Primary Clinic Line Merary Montes DO - [phone]   24 Hour Appointment Line [phone] or  [phone] ([phone]) (toll-free)   24 Hour Nurse Line [phone] (toll-free)   Preferred Urgent Care [facility], [phone]   [facility]  [phone]   Preferred Pharmacy [facility] - [place] [id number] [facility]     Behavioral Health Crisis Line The National Suicide Prevention Lifeline at 1-184.838.4826 or 911             My Care Team Members  Patient Care Team       Relationship Specialty Notifications Start End    Merary Montes DO PCP - General Family Medicine  8/23/19     Phone: [phone] Fax: [phone]         [address] [place] [address]    Roxane Kruse Lead Care Coordinator   6/14/19     [facility] Care Coordination;  Assistant Ching Finnegan - [phone]    Phone: [phone] Fax: [phone]        [facility] Patient Care Assistant Home Health Services  6/14/19     PCA; Consent on file    Phone: [phone]         Juan Sethi MD Assigned PCP   7/28/19     Phone: [phone] Fax: [phone]         [address] [place] [address] [address] [place] [address]    Alicia Tavo    10/21/19     [facility]  Case# [id number]    Phone: [phone]         Serene Farias, CHW Community Health Worker Primary Care - [facility] Admissions 10/21/19     Phone: [phone] Fax: [phone]        [facility]     10/21/19      support with citizenship application [address], [place] [address]  MANJU signed: 10-21-19     Phone: [phone] Fax: [phone]        [facility] [address] [place] [address]     Gary Giang LGSW Lead Care Coordinator Primary Care - [facility] Admissions 10/21/19     Fax: [phone]         Charleen Estrada RN Clinic Care Coordinator Primary Care - [facility] Admissions 10/21/19             My Care Plans  Self Management and Treatment Plan  Goals and (Comments)  Goals        General    Financial Wellbeing (pt-stated)     Notes - Note created  10/21/2019 12:49 PM by Gary Giang LGSW    Goal Statement- I want to make sure my renewal information is done for my medical insurance as soon as possible    Action steps to achieve this goal  1.  [facility] SW will contact financial worker and see if renewal needs to be completed  2.  I will work with FRW if my renewal needs to be compelted      Date goal set:  10/21/2019 BC          Medical (pt-stated)     Notes - Note created  10/21/2019 12:50 PM by Gary Giang LGSW    Goal Statement- I want a shower chair within the next 1-2 months    Action steps to achieve this goal  1.  [facility] SW will contact elderly waiver  to inquire about getting a shower chair      Date goal set:  10/21/2019 BC          Psychosocial (pt-stated)     Notes -  Note created  10/21/2019 12:48 PM by Gary Giang LGSW    Goal Statement- I want to be connected to an agency to help me obtain citizenship within 1-2 months    Action steps to achieve this goal  1.  I will attend a follow up appt. With  to complete an intake with [facility]  2.  I will let my CHW know if I receive any correspondence from them and schedule with CCC if I need assistance    Date goal set:  10/21/2019 BC                   Advance Care Plans/Directives Type:        My Medical and Care Information  Problem List   Patient Active Problem List   Diagnosis   ? Insomnia   ? Multiple joint pain   ? Hearing loss   ? Soft tissue mass   ? SNHL (sensorineural hearing loss)   ? Incontinence   ? Back pain   ? Memory difficulty   ? Mild single current episode of major depressive disorder (H)   ? Loose, teeth   ? Loss of appetite   ? TB lung, latent      Current Medications and Allergies:  See printed Medication Report.    Care Coordination Start Date: 10/21/2019   Frequency of Care Coordination:     Form Last Updated: 10/21/2019

## 2021-06-19 NOTE — LETTER
Letter by Gary Giang LGSW at      Author: Gary Giang LGSW Service: -- Author Type: --    Filed:  Encounter Date: 10/21/2019 Status: Signed       CARE COORDINATION    October 21, 2019    Teodoro Gregorio  [address] [place] [address]      Dear Teodoro,    I am a clinic care coordinator who works with Merary Montes DO at [facility]. I wanted to thank you for spending the time to talk with me.  Below is a description of clinic care coordination and how I can further assist you.     The clinic care coordinator team is made up of a registered nurse,  and community health worker who understand the health care system. The goal of clinic care coordination is to help you manage your health and improve access to the health care system in the most efficient manner. The team can assist you in meeting your health care goals by providing education, coordinating services, strengthening the communication among your providers, assist you with any financial, behavioral, psychosocial, chemical dependency, counseling, and/or psychiatric resources if needed.    Please feel free to contact Serene Farias, the Community Health Worker, at [phone] with any questions or concerns. We are focused on providing you with the highest-quality healthcare experience possible and that all starts with you.     Sincerely,   Gary Giang  Enclosed: I have enclosed a copy of the Care Plan. This has helpful information and goals that we have talked about. Please keep this in an easy to access place to use as needed.

## 2021-06-20 NOTE — LETTER
Letter by Samantha Velazco LICSW at      Author: Samantha Velazco LICSW Service: -- Author Type: --    Filed:  Encounter Date: 12/30/2019 Status: Signed         Teodoro Gregorio  [address] [place] [address]                 December 30, 2019       Dear Mr. Gregorio,      I hope this letter finds you well.  I missed you at your last appointment and at this time, you do not have a future appointment scheduled with me.     If you would like to reschedule please call [phone] ([facility] outpatient clinic) or [phone] ([facility]: scheduling) to set up an appointment with me.  If we do not hear from you for 3 months, we will close your file.  If you prefer not to reschedule at this time please know you can reopen your file at a later date or seek counseling with another mental health professional.  If you would like a referral or assistance in locating another mental health agency or professional please let us know.        Sincerely,        Electronically signed by CARLENE Wilsno

## 2021-06-21 NOTE — PROGRESS NOTES
"ASSESMENT AND PLAN:  Diagnoses and all orders for this visit:    Back pain  Increased neurontin to 2 tbs two times a day.    Mild single current episode of major depressive disorder (H)  Improving. Continue current med.  Follow-up in 3 months.    Insomnia  Improving.    Hearing loss  Offered reevaluation by audiology for hearing aid adjustment.  Patient declined.    TB lung, latent  Treated in 2016.  Will try to get records from [facility].    Need for influenza vaccination  -     Influenza High Dose, Seasonal 65+ yrs    Teeth missing  Offered referral for dentures, patient declined.      SUBJECTIVE: Teodoro Gregorio is some 78-year-old male with history of depression, insomnia, hearing loss and back pain here for follow-up.  He is here with her his PCA.  His lives with his PCA.  Main concern is back pain, has been complaining of back pain for several years.  States Neurontin is helping, it was prescribed to take 1 in the morning and 2 at night but he is taking only once a day at night.  Denied urinary or bowel symptoms.    PCA states his depression is improving.  He is not crying anymore.  Denied irritability and frequent anger outbursts.  He is sleeping better and eating better.  Denied suicidal or homicidal ideations.  His wife is living with his daughter, he lives with PCA.  He goes to adult  several times a week.    He has hearing aid, does not like to wear it.  States\" it's too loud \".    Past Medical History:   Diagnosis Date     Constipation      Hearing loss      Insomnia      Multiple joint pain      TB lung, latent      Patient Active Problem List   Diagnosis     Insomnia     Multiple joint pain     Hearing loss     Soft tissue mass     SNHL (sensorineural hearing loss)     TB lung, latent     Incontinence     Back pain     Memory difficulty     Mild single current episode of major depressive disorder (H)     Loose, teeth     Loss of appetite       Allergies:  No Known " "Allergies    History   Smoking Status     Former Smoker   Smokeless Tobacco     Current User     Types: Chew     Comment: betel nut       Review of systems otherwise negative except as listed in HPI.   History   Smoking Status     Former Smoker   Smokeless Tobacco     Current User     Types: Chew     Comment: betel nut       OBJECTICE: BP 98/68 (Patient Site: Left Arm, Patient Position: Sitting, Cuff Size: Adult Regular)  Pulse 64  Temp 97.9  F (36.6  C) (Oral)   Resp 16  Ht 5' 1\" (1.549 m)  Wt 122 lb (55.3 kg)  BMI 23.05 kg/m2        GEN-alert,  in no apparent distress. Mood is normal today.  HEENT-mucous membranes are moist, neck is supple.  CV-regular rate and rhythm with no murmur.   RESP-lungs clear to auscultation .  ABDOMEN- Soft , not tender.  EXTREM- No edema.  No joint swelling or tenderness.  BACK- No tenderness today.   SKIN-normal    This transcription uses voice recognition software, which may contain typographical errors.        Juan Sethi   10/24/2018     "

## 2021-06-25 ENCOUNTER — COMMUNICATION - HEALTHEAST (OUTPATIENT)
Dept: NURSING | Facility: CLINIC | Age: 81
End: 2021-06-25

## 2021-06-28 NOTE — PROGRESS NOTES
Progress Notes by Gary Giang LGSW at 10/21/2019 10:00 AM     Author: Gary Giang LGSW Service: -- Author Type:     Filed: 10/21/2019  1:02 PM Encounter Date: 10/21/2019 Status: Signed    : Gary Giang LGSW ()       Clinic Care Coordination Contact  SW met with Lev Valerio (friend/PCA), and  to complete a SW assessment for enrollment into [facility].    Patient is enrolled and current goals will focus on obtaining a shower chair, confirming MA renewal is complete, and citizenship.     Left voicemail for Alicia Vo at [facility] to inquire if a renewal is due at this time.    MANJU signed for DHS.     Attempted to contact Mabel Betts Care Coordinator, but number is not working.    Clinic Care Coordination Contact  OUTREACH    Referral Information:  Referral Source: PCP    Primary Diagnosis: Psychosocial    Chief Complaint   Patient presents with   ? Clinic Care Coordination - Initial   ? Clinic Care Coodination - Face To Face        Universal Utilization:   Clinic Utilization  Difficulty keeping appointments:: No  Compliance Concerns: No  No-Show Concerns: No  No PCP office visit in Past Year: No  Utilization    Last refreshed: 10/21/2019 12:41 PM:  Hospital Admissions 0           Last refreshed: 10/21/2019 12:41 PM:  ED Visits 0           Last refreshed: 10/21/2019 12:41 PM:  No Show Count (past year) 1              Current as of: 10/21/2019 12:41 PM              Clinical Concerns:  Current Medical Concerns:  Reported arthritis, back and knee pain  Current Behavioral Concerns: Reported experiencing depression    Education Provided to patient: Role of CCC   Pain  Pain (GOAL):: No  Health Maintenance Reviewed: Not assessed  Clinical Pathway: None     Medication Management:  No medication concerns      Functional Status:  Dependent ADLs:: Ambulation-cane, Ambulation-walker, Bathing, Dressing, Grooming  Dependent IADLs:: Cleaning, Cooking, Laundry,  Shopping, Meal Preparation, Transportation  Bed or wheelchair confined:: No  Mobility Status: Independent w/Device  Fallen 2 or more times in the past year?: No  Any fall with injury in the past year?: No  Teodoro has a PCA for 3.5 hours per day who helps with the items noted above. He has a cane and a walker. PCA asked if SW can assist in getting a shower chair for him, will reach out to EW CM.   Living Situation:  Current living arrangement:: I live in a private home  [facility]  Teodoro is living with [facility] and pays $350 per month in rent.   Diet/Exercise/Sleep:  Inadequate nutrition (GOAL):: No  Food Insecurity: No  Tube Feeding: No  Exercise:: Yes  Days per week of moderate to strenuous exercise (like a brisk walk): 5  On average, minutes per day of exercise at this level: 10  How intense was your typical exercise?: Light (like stretching or slow walking)  Exercise Minutes per Week: 50  Inadequate activity/exercise (GOAL):: No  Significant changes in sleep pattern (GOAL): Yes  Teodoro reported not being able to sleep due to depression. No food concerns. He is doing his physical therapy exercises daily.   Transportation:  Transportation concerns (GOAL):: No  Transportation means:: Regular car, Family, Medical transport     Psychosocial:  Rastafari or spiritual beliefs that impact treatment:: No  Mental health DX:: Yes  Mental health management concern (GOAL):: Yes  Informal Support system:: Family   Teodoro reported experiencing depression and will be seeing a therapist at [facility]  Financial/Insurance:   Financial/Insurance concerns (GOAL):: Yes  Teodoro receives $771 in SSI and $20 in food support. [facility] was worried about making sure his renewal is done for MA.     Resources and Interventions:  Current Resources:    ;   Community Resources: PCA, , County Worker     Equipment Currently Used at Home: cane, straight, walker, standard    Advance Care Plan/Directive  Advanced Care  Plans/Directives on file:: No          Goals:   Goals        General    Financial Wellbeing (pt-stated)     Notes - Note created  10/21/2019 12:49 PM by Gary Giang LGSW    Goal Statement- I want to make sure my renewal information is done for my medical insurance as soon as possible    Action steps to achieve this goal  1.  CCC LIOR will contact financial worker and see if renewal needs to be completed  2.  I will work with FRW if my renewal needs to be compelted      Date goal set:  10/21/2019 BC          Medical (pt-stated)     Notes - Note created  10/21/2019 12:50 PM by Gary Giang LGSW    Goal Statement- I want a shower chair within the next 1-2 months    Action steps to achieve this goal  1.  CCC LIOR will contact elderly waiver  to inquire about getting a shower chair      Date goal set:  10/21/2019 BC          Psychosocial (pt-stated)     Notes - Note created  10/21/2019 12:48 PM by Gary Giang LGSW    Goal Statement- I want to be connected to an agency to help me obtain citizenship within 1-2 months    Action steps to achieve this goal  1.  I will attend a follow up appt. With  to complete an intake with [facility]  2.  I will let my CHW know if I receive any correspondence from them and schedule with [facility] if I need assistance    Date goal set:  10/21/2019 BC                  Patient/Caregiver understanding: Reported understanding       Future Appointments              In 2 weeks Merary Montes [facility] Family Medicine/OB, [facility]    In 3 weeks [facility] Clinical Support, [facility]          Plan: SW will contact GABRIELA MORRISON to inquire about shower chair, will contact [facility] Financial worker to check about renewal form

## 2021-06-29 NOTE — PROGRESS NOTES
Progress Notes by Gary Giang LGSW at 10/14/2020  3:00 PM     Author: Gary Giang LGSW Service: -- Author Type:     Filed: 10/14/2020  3:42 PM Encounter Date: 10/14/2020 Status: Signed    : Gary Giang LGSW ()       Clinic Care Coordination Contact  CCC LIOR contacted patient and his PCA, Eh, with an  to complete an updated assessment.    SW completed the N-400 form for [employer].         Clinic Care Coordination Contact  OUTREACH    Referral Information:  Referral Source: PCP    Primary Diagnosis: Psychosocial    Chief Complaint   Patient presents with   ? Clinic Care Coordination - Initial        Universal Utilization:   Clinic Utilization  Difficulty keeping appointments:: No  Compliance Concerns: No  No-Show Concerns: No  No PCP office visit in Past Year: No  Utilization    Last refreshed: 10/14/2020  3:25 PM: Hospital Admissions 0           Last refreshed: 10/14/2020  3:25 PM: ED Visits 0           Last refreshed: 10/14/2020  3:25 PM: No Show Count (past year) 1              Current as of: 10/14/2020  3:25 PM              Clinical Concerns:  Current Medical Concerns:  No concerns    Current Behavioral Concerns: No concerns    Education Provided to patient: Role of CCC   Pain  Pain (GOAL):: No  Health Maintenance Reviewed: Not assessed  Clinical Pathway: None     Medication Management:  No concerns      Functional Status:  Dependent ADLs:: Ambulation-walker, Ambulation-cane, Bathing, Dressing, Grooming  Dependent IADLs:: Cleaning, Cooking, Laundry, Shopping, Meal Preparation, Medication Management  Bed or wheelchair confined:: No  Mobility Status: Independent w/Device  Fallen 2 or more times in the past year?: No  Any fall with injury in the past year?: No    Living Situation:  Current living arrangement:: I live in a private home(Lives with PCA)  Type of residence:: Private home - stairs    Lifestyle & Psychosocial Needs:        Diet::  Regular(Appetie: Good, Have enough food at home)  Inadequate nutrition (GOAL):: No  Tube Feeding: No  Inadequate activity/exercise (GOAL):: No  Significant changes in sleep pattern (GOAL): No  Transportation means:: Regular car, Family, Medical transport     Episcopal or spiritual beliefs that impact treatment:: No  Mental health DX:: Yes  Mental health DX how managed:: Medication  Mental health management concern (GOAL):: Yes  Informal Support system:: Family   Socioeconomic History   ? Marital status:      Spouse name: Not on file   ? Number of children: Not on file   ? Years of education: Not on file   ? Highest education level: Not on file     Tobacco Use   ? Smoking status: Former Smoker   ? Smokeless tobacco: Current User     Types: Chew   ? Tobacco comment: betel nut   Substance and Sexual Activity   ? Alcohol use: No   ? Drug use: No   ? Sexual activity: Never                Resources and Interventions:  Current Resources:      Community Resources: PCA, , County Worker     Equipment Currently Used at Home: cane, straight, walker, standard  Type of Employment: Disability       Advance Care Plan/Directive  Advanced Care Plans/Directives on file:: No  Advanced Care Plan/Directive Status: Not Applicable          Goals:   Goals        General    Psychosocial (pt-stated)     Notes - Note edited  10/6/2020 11:38 AM by Serene Farias, CHW    Goal Statement: I want to be connected with an agency to assist me with citizenship within 1-2 months.  Date Goal set: 03/30/20  Barriers: Language  Strengths: Family support  Date to Achieve By: 5/30/2020  Patient expressed understanding of goal: yes  Action steps to achieve this goal:  1. PCA -Eh Eh Peter and I will talk to CCC SW on 10-14-20 at 3pm for support to complete the form from [facility].  2. PCA, Eh Eh Peter will support to update [facility] team at next outreach.    [facility])-support to apply for Naturalization (N-400)  [address]  [place] [address]  [phone]  Fax: [phone]  Intake Line [phone]    Updated: 10/6/2020 AL              Patient/Caregiver understanding: Rpeorted understanding           Plan: SW will send the completed N-400 form once [facility]

## 2021-06-30 NOTE — PROGRESS NOTES
Progress Notes by Omar Gregory CMA at 11/25/2020  3:30 PM     Author: Omar Gregory CMA Service: -- Author Type: Certified Medical Assistant    Filed: 11/25/2020  2:23 PM Encounter Date: 11/25/2020 Status: Attested    : Omar Gregory CMA (Certified Medical Assistant) Cosigner: Merary Montes DO at 11/25/2020  9:46 PM    Attestation signed by Merary Montes DO at 11/25/2020  9:46 PM    noted                I met with Teodoro Gregorio at the request of Dr. Montes to recheck his blood pressure.  Blood pressure medications on the MAR were reviewed with patient.    Patient has taken all medications as per usual regimen: Yes  Patient reports tolerating them without any issues or concerns: Yes    Vitals:    11/25/20 1418   BP: 105/61   Patient Site: Left Arm   Patient Position: Sitting   Cuff Size: Adult Regular   Pulse: 65       Blood pressure was taken, previous encounter was reviewed, recorded blood pressure below 140/90.  Patient was discharged and the note will be sent to the provider for final review.

## 2021-07-03 NOTE — ADDENDUM NOTE
Addendum Note by Merary Montes DO at 7/22/2019  2:30 PM     Author: Merary Montes DO Service: -- Author Type: Physician    Filed: 7/22/2019  2:30 PM Encounter Date: 7/22/2019 Status: Signed    : Merary Montes DO (Physician)    Addended by: MERARY MONTES on: 7/22/2019 02:30 PM        Modules accepted: Orders

## 2021-07-05 ENCOUNTER — RECORDS - HEALTHEAST (OUTPATIENT)
Dept: ADMINISTRATIVE | Facility: OTHER | Age: 81
End: 2021-07-05

## 2021-07-07 NOTE — PROGRESS NOTES
6/25/2021   Clinic Care Coordination Contact    Community Health Worker Follow Up  Maintenance 2 months follow up call.  [facility] Anamika : Chaka    Called and spoke to patient and patient's niece/PCA through Anamika   Patient's niece/PCA reported:  -received letter from the immigration that they received approved the application  -just waiting for the interview or fingerprint appt with immigration office.  Explained to niece that  at [facility] will contact her to prep for interview and support to find [employer] .  Encouraged her to keep in contact with  at [facility].    Discussed with patient if he has any goals or other needs before graduating from [employer].    Patient's niece to wait to graduate from [employer] until he receives the interview date from immigration office.  She might need help to connect with the  when she receives the letter or what she needs to do next.   Explained that when she receives the letter she can call back for support.  We will graduate from [employer] at this time.  Niece aware to call CHW for support.    Patient has completed all goals with Clinic Care Coordination.   Routed to [facility] to review the chart and confirm if graduation is approved.

## 2021-08-06 NOTE — PATIENT INSTRUCTIONS - HE
Patient Instructions by Merary Montes DO at 2/19/2020  3:00 PM     Author: Merary Montes DO Service: -- Author Type: Physician    Filed: 2/19/2020  3:35 PM Encounter Date: 2/19/2020 Status: Signed    : Merary Montes DO (Physician)         Patient Education     Exercise for a Healthier Heart  You may wonder how you can improve the health of your heart. If youre thinking about exercise, youre on the right track. You dont need to become an athlete, but you do need a certain amount of brisk exercise to help strengthen your heart. If you have been diagnosed with a heart condition, your doctor may recommend exercise to help stabilize your condition. To help make exercise a habit, choose safe, fun activities.       Be sure to check with your health care provider before starting an exercise program.    Why exercise?  Exercising regularly offers many healthy rewards. It can help you do all of the following:    Improve your blood cholesterol levels to help prevent further heart trouble    Lower your blood pressure to help prevent a stroke or heart attack    Control diabetes, or reduce your risk of getting this disease    Improve your heart and lung function    Reach and maintain a healthy weight    Make your muscles stronger and more limber so you can stay active    Prevent falls and fractures by slowing the loss of bone mass (osteoporosis)    Manage stress better  Exercise tips  Ease into your routine. Set small goals. Then build on them.  Exercise on most days. Aim for a total of 150 or more minutes of moderate to  vigorous intensity activity each week. Consider 40 minutes, 3 to 4 times a week. For best results, activity should last for 40 minutes on average. It is OK to work up to the 40 minute period over time. Examples of moderate-intensity activity is walking one mile in 15 minutes or 30 to 45 minutes of yard work.  Step up your daily activity level. Along with your exercise program, try being more active  throughout the day. Walk instead of drive. Do more household tasks or yard work.  Choose one or more activities you enjoy. Walking is one of the easiest things you can do. You can also try swimming, riding a bike, or taking an exercise class.  Stop exercising and call your doctor if you:    Have chest pain or feel dizzy or lightheaded    Feel burning, tightness, pressure, or heaviness in your chest, neck, shoulders, back, or arms    Have unusual shortness of breath    Have increased joint or muscle pain    Have palpitations or an irregular heartbeat      9435-1748 [employer]. [address] [place] [address]. All rights reserved. This information is not intended as a substitute for professional medical care. Always follow your healthcare professional's instructions.         Patient Education    Home Fire Safety  Each year, thousands of people, including children, are injured and killed in home fires. Children are often curious about fire, and may not understand the dangers. This makes home fire safety practices especially important. Three important things you can do to keep your home safe from fire are:    Install smoke alarms in your home and make sure they work properly.    Teach children not to play with matches, lighters, and other materials that can be used to start fires. And keep these materials out of childrens reach.    Teach children what to do in case of fire. Create a fire safety action plan and practice it.  Read on for more details about keeping your family and home safe from fire.        Being Prepared for a Fire  A home fire can happen at any time. The following can help you be prepared:    Install smoke alarms on every level of your home, including the basement and outside all sleeping areas. This simple step cuts your familys risk of dying in a fire nearly in half.    Test smoke alarms monthly, and change the batteries once a year or when the alarm chirps.    Dont disable  smoke alarms, even for a short time.    Ask your local fire department for tips on where to place smoke alarms in your home.    Replace all smoke alarms every 10 years.    Consider using voice smoke alarms. These alarms allow you to substitute your own voice for the alarm sound. They are helpful because many children dont wake up to the sound of a regular smoke alarm.    Install carbon monoxide detectors near sleeping areas.    Be aware that carbon monoxide is a byproduct of smoke that can be deadly. Its a gas that you cant see, smell, or taste.    Consider buying a combination smoke alarm / carbon monoxide detector.    Keep fire extinguishers in the home.    Keep them in accessible locations, especially in the kitchen.    Check usage dates to make sure they are not .    Use fire extinguishers only when the fire is in a contained area and is not spreading. (Otherwise, you should focus on getting out of the home.)    Train adults to use fire extinguishers. (Children should focus on getting out of the home during a fire.)    If you live in an apartment, talk to your landlord about where smoke alarms are and how often they are tested. Also ask about fire extinguisher locations and emergency exit routes.  Indoor Fire Safety  Many things in your home are potential fire hazards. Follow these steps to help keep your home safe.    Be careful in the kitchen.    Never leave food thats cooking unattended.    If a fire breaks out in a cooking pan, put a lid on it to smother it. And never throw water on a grease fire. It will make the fire worse.    Conduct a home safety inspection. Look for anything, such as frayed wires and cords, that can cause a fire. Fix or remove any fire safety hazards you find.    Keep all matches and lighters in a secured drawer or cabinet out of the reach of children. Use childproof lighters.    Check to make sure all appliances, including the stove, are turned off before leaving the  home.    Know where the gas main shut-off is located.    Make sure space heaters are stable and have protective covers. Keep them at least 3 feet from anything that can burn, such as curtains. Dont use space heaters in areas where young kids spend time alone.    Keep flammable liquids such as kerosene and gasoline locked up and safely stored away from kids and heat.    Keep all smoking materials out of reach of children. And never smoke in bed. If possible, smoke outdoors only.  Outdoor Fire Safety  Fire can be a hazard outdoors as well as indoors. When outdoors, be sure to do the following:    Always supervise kids near a barbecue grill, campfire, or portable stove.    Dont use fire pits around children. Kids can fall into them, and pits can be hot even after the fire goes out.    Keep a garden hose or fire extinguisher handy when cooking outdoors in case of fire.  Teaching Your Child About Fire Safety  One of the best ways to keep your home safe from fire is education. Make sure everyone in your family knows fire safety rules, including children.    Teach your children the dangers of matches, lighters, and other dangerous items.    Teach them to never touch these and other objects that are hot, such as candles.    Have them tell you right away whenever they find matches or lighters. Explain that these items are tools for grown-ups, not toys. And never amuse children with matches or lighters.    Round up all matches and lighters and store them safely. In case you missed some, ask your children to tell you where any are located throughout your home.    Never leave a child alone in a room with a lit candle. Dont allow teens to have candles in their rooms.    Show children what to do in case of fire.    Be sure your kids know what the fire alarm sounds like and what to do if it goes off.    Teach kids what to do if their clothes catch fire: Stop, Drop to the ground, and Roll until the fire is put out. They should also  cover their face with their hands. Practice these steps with your children. Make sure they understand that running will make the fire burn faster.    Show children how to crawl below smoke during a fire.    Make sure kids know at least two escape routes from each room in the home. These escape routes can be windows.    Teach kids to test doors for nearby fire by feeling for heat with the back of their hand. If the door is warm or hot, they should try their second exit.    Explain to children that they cant hide from a fire. Hiding in a closet or under a bed wont make them safe. Instead, they should try to escape the home. And if they cant escape, they should let others know they are trapped. They can do this by shutting the door to the room, opening a window, and turning on the lights.    Talk to your local fire department.    Introduce your children to a . Let them know that firefighters will look different when in full protective gear. Tell them to never hide from firefighters, and to follow all directions from firefighters during a fire.    Find out if the fire department has a fire safety program for kids.      Create a Fire Safety Plan  Create a plan for your family to follow in case of a fire. Try making it a family project. Important steps for the plan include leaving the home right away and having a designated meeting place.    Make sure your child understands to get out and stay out. He or she should get out of the home immediately and not go back in, even if family members or pets are still inside.    Decide on a safe meeting place away from the home for everyone to gather.    Teach children to call 911 or emergency services from a cell phone or neighbors phone. Make sure they know to do this only after they are safely out of the home.    Teach your children the fire safety plan. Practice it and make sure they understand it.    Have fire drills twice a year to keep your children prepared in case  of fire.    Visit the National Fire Protection Association web site at www.nfpa.org for more information.      2331-7627 [employer]. [address] [place] [address], [place] [address]. All rights reserved. This information is not intended as a substitute for professional medical care. Always follow your healthcare professional's instructions.         Patient Education   Activities of Daily Living  Your Health Risk Assessment indicates you have difficulties with activities of daily living such as eating, getting dressed, grooming, bathing, walking, or using the toilet. Please make a follow up appointment for us to address this issue in more detail.     Patient Education   Instrumental Activities of Daily Living  Your Health Risk Assessment indicates you have difficulties with instrumental activities of daily living which include laundry, housekeeping, banking, shopping, using the telephone, food preparation, transportation, or taking your own medications. Please make a follow up appointment for us to address this issue in more detail.    [employer] has resources available on the following website: [email or web address]     Also, here is a local agency that provides help with meals and other assistance:   [facility] Line: [phone]     Patient Education   Urinary Incontinence (Male)    Urinary incontinence means not being able to control the release of urine from the bladder.  Causes  Common causes of urinary incontinence in men include:    Infection    Certain medicines    Aging    Poor pelvic muscle tone    Bladder spasms    Obesity    Urinary retention  Nervous system diseases, diabetes, sleep apnea, urinary tract infections, prostate surgery, and pelvic trauma can also cause incontinence. Constipation and smoking have also been identified as risk factors.  Symptoms    Urge incontinence (also called overactive bladder) is a sudden urge to urinate even though there may not be much  urine in the bladder. The need to urinate often during the night is common. It is due to bladder spasms.    Stress incontinence is involuntary urine leakage that can occur with sneezing, coughing, and other actions that put stress on the bladder.  Treatment  Treatment of urinary incontinence depends on the cause. Infections of the bladder are treated with antibiotics. Urinary retention is treated with a bladder catheter.  Home care  Follow these guidelines when caring for yourself at home:    Don't consume foods and drinks that may irritate the bladder. These include drinks containing alcohol, caffeinate, or carbonation; chocolate; and acidic fruits and juices.    Limit fluid intake to 6 to 8 cups a day.    Lose weight if you are overweight. This will reduce your symptoms.    If needed, wear absorbent pads to catch urine. Change pads frequently to maintain hygiene and prevent skin and bladder infections.    Bathe daily to maintain good hygiene.    If an antibiotic was prescribed to treat a bladder infection, be sure to take it until finished, even if you are feeling better before then. This is to make sure your infection has cleared.    If a catheter was left in place, it is important to keep bacteria from getting into the collection bag. Don't disconnect the catheter from the collection bag.    Use a leg band to secure the catheter drainage tube, so it does not pull on the catheter. Drain the collection bag when it becomes full using the drain spout at the bottom of the bag. Don't disconnect the bag from the catheter.    Don't pull on or try to remove a catheter. The catheter must be removed by a healthcare provider.  Follow-up care  Follow up with your healthcare provider, or as advised.  When to seek medical advice  Call your healthcare provider right away if any of these occur:    Fever over 100.4 F (38 C), or as directed by your healthcare provider    Bladder pain or fullness    Abdominal swelling, nausea, or  vomiting    Back pain    Weakness, dizziness, or fainting    If a catheter was left in place, return if:  ? Catheter falls out  ? Catheter stops draining for 6 hours  Date Last Reviewed: 10/1/2017    7986-7948 [employer]. [address] [facility], [place] [address]. All rights reserved. This information is not intended as a substitute for professional medical care. Always follow your healthcare professional's instructions.     Patient Education   Your Health Risk Assessment indicates you feel you are not in good emotional health.    Recreation   Recreation is not limited to sports and team events. It includes any activity that provides relaxation, interest, enjoyment, and exercise. Recreation provides an outlet for physical, mental, and social energy. It can give a sense of worth and achievement. It can help you stay healthy.    Mental Exercise and Social Involvement  Mental and emotional health is as important as physical health. Keep in touch with friends and family. Stay as active as possible. Continue to learn and challenge yourself.   Things you can do to stay mentally active are:    Learn something new, like a foreign language or musical instrument.     Play SCRABBLE or do crossword puzzles. If you cannot find people to play these games with you at home, you can play them with others on your computer through the Internet.     Join a games club--anything from card games to chess or checkers or lawn bowling.     Start a new hobby.     Go back to school.     Volunteer.     Read.     Keep up with world events.       Patient Education   Depression and Suicide in Older Adults  Nearly 2 million older Americans have some type of depression. Sadly, some of them even take their own lives. Yet depression among older adults is often ignored. Learn the warning signs. You may help spare a loved one needless pain. You may also save a life.       What Is Depression?  Depression is a mood disorder that affects the  way you think and feel. The most common symptom is a feeling of deep sadness. People who are depressed also may seem tired and listless. And nothing seems to give them pleasure. Its normal to grieve or be sad sometimes. But sadness lessens or passes with time. Depression rarely goes away or improves on its own. Other symptoms of depression are:    Sleeping more or less than normal    Eating more or less than normal    Having headaches, stomachaches, or other pains that dont go away    Feeling nervous, empty, or worthless    Crying a great deal    Thinking or talking about suicide or death    Feeling confused or forgetful  What Causes It?  The causes of depression arent fully known. Certain chemicals in the brain play a role. Depression does run in families. And life stresses can also trigger depression in some people. That may be the case with older adults. They often face great burdens, such as the death of friends or a spouse. They may have failing health. And they are more likely to be alone, lonely, or poor.  How You Can Help  Often, depressed people may not want to ask for help. When they do, they may be ignored. Or, they may receive the wrong treatment. You can help by showing parents and older friends love and support. If they seem depressed, help them find the right treatment. Talk to your doctor. Or contact a local mental health center, social service agency, or hospital. With modern treatment, no one has to suffer from depression.  Resources:    National [place] of Mental Health  397.991.3911  www.nimh.nih.gov    National [place] on Mental Illness  736.811.5053  www.[place].org    [facility]  651.112.4892  www.[facility].org    National Suicide Hotline  113.852.5526 (800-SUICIDE)      4003-6641 [employer]. [address] [place] [address], [place] [address]. All rights reserved. This information is not intended as a substitute for professional medical care. Always follow your healthcare  professional's instructions.           Advance Directive  Patients advance directive was discussed and I am comfortable with the patients wishes.  Patient Education   Personalized Prevention Plan  You are due for the preventive services outlined below.  Your care team is available to assist you in scheduling these services.  If you have already completed any of these items, please share that information with your care team to update in your medical record.  Health Maintenance   Topic Date Due   ? ZOSTER VACCINES (1 of 2) 01/01/1988   ? MEDICARE ANNUAL WELLNESS VISIT  01/01/2003   ? FALL RISK ASSESSMENT  09/25/2020   ? ADVANCE CARE PLANNING  11/26/2024   ? TD 18+ HE  04/07/2026   ? DEPRESSION ACTION PLAN  Completed   ? PNEUMOCOCCAL IMMUNIZATION 65+ LOW/MEDIUM RISK  Completed   ? INFLUENZA VACCINE RULE BASED  Completed

## 2021-10-06 ENCOUNTER — OFFICE VISIT (OUTPATIENT)
Dept: FAMILY MEDICINE | Facility: CLINIC | Age: 81
End: 2021-10-06
Payer: COMMERCIAL

## 2021-10-06 VITALS
RESPIRATION RATE: 16 BRPM | SYSTOLIC BLOOD PRESSURE: 108 MMHG | BODY MASS INDEX: 21.63 KG/M2 | TEMPERATURE: 97.8 F | DIASTOLIC BLOOD PRESSURE: 58 MMHG | WEIGHT: 114.5 LBS | HEART RATE: 72 BPM

## 2021-10-06 DIAGNOSIS — R42 DIZZINESS: ICD-10-CM

## 2021-10-06 DIAGNOSIS — M25.562 CHRONIC PAIN OF LEFT KNEE: ICD-10-CM

## 2021-10-06 DIAGNOSIS — L30.8 OTHER ECZEMA: ICD-10-CM

## 2021-10-06 DIAGNOSIS — R53.83 FATIGUE, UNSPECIFIED TYPE: Primary | ICD-10-CM

## 2021-10-06 DIAGNOSIS — R63.0 POOR APPETITE: ICD-10-CM

## 2021-10-06 DIAGNOSIS — G47.00 INSOMNIA, UNSPECIFIED TYPE: ICD-10-CM

## 2021-10-06 DIAGNOSIS — H04.123 DRY EYES: ICD-10-CM

## 2021-10-06 DIAGNOSIS — F32.0 MILD SINGLE CURRENT EPISODE OF MAJOR DEPRESSIVE DISORDER (H): ICD-10-CM

## 2021-10-06 DIAGNOSIS — G89.29 CHRONIC PAIN OF LEFT KNEE: ICD-10-CM

## 2021-10-06 DIAGNOSIS — M54.50 BILATERAL LOW BACK PAIN WITHOUT SCIATICA, UNSPECIFIED CHRONICITY: ICD-10-CM

## 2021-10-06 LAB
ALBUMIN SERPL-MCNC: 4 G/DL (ref 3.5–5)
ALBUMIN UR-MCNC: NEGATIVE MG/DL
ALP SERPL-CCNC: 59 U/L (ref 45–120)
ALT SERPL W P-5'-P-CCNC: 22 U/L (ref 0–45)
ANION GAP SERPL CALCULATED.3IONS-SCNC: 11 MMOL/L (ref 5–18)
APPEARANCE UR: CLEAR
AST SERPL W P-5'-P-CCNC: 31 U/L (ref 0–40)
BACTERIA #/AREA URNS HPF: NORMAL /HPF
BILIRUB SERPL-MCNC: 0.6 MG/DL (ref 0–1)
BILIRUB UR QL STRIP: NEGATIVE
BUN SERPL-MCNC: 21 MG/DL (ref 8–28)
CALCIUM SERPL-MCNC: 9.7 MG/DL (ref 8.5–10.5)
CHLORIDE BLD-SCNC: 105 MMOL/L (ref 98–107)
CO2 SERPL-SCNC: 25 MMOL/L (ref 22–31)
COLOR UR AUTO: YELLOW
CREAT SERPL-MCNC: 1.37 MG/DL (ref 0.7–1.3)
ERYTHROCYTE [DISTWIDTH] IN BLOOD BY AUTOMATED COUNT: 15.4 % (ref 10–15)
GFR SERPL CREATININE-BSD FRML MDRD: 47 ML/MIN/1.73M2
GLUCOSE BLD-MCNC: 65 MG/DL (ref 70–125)
GLUCOSE UR STRIP-MCNC: NEGATIVE MG/DL
HCT VFR BLD AUTO: 38 % (ref 40–53)
HGB BLD-MCNC: 12.2 G/DL (ref 13.3–17.7)
HGB UR QL STRIP: NEGATIVE
KETONES UR STRIP-MCNC: ABNORMAL MG/DL
LEUKOCYTE ESTERASE UR QL STRIP: NEGATIVE
MCH RBC QN AUTO: 26.9 PG (ref 26.5–33)
MCHC RBC AUTO-ENTMCNC: 32.1 G/DL (ref 31.5–36.5)
MCV RBC AUTO: 84 FL (ref 78–100)
NITRATE UR QL: NEGATIVE
PH UR STRIP: 6 [PH] (ref 5–8)
PLATELET # BLD AUTO: 188 10E3/UL (ref 150–450)
POTASSIUM BLD-SCNC: 4.1 MMOL/L (ref 3.5–5)
PROT SERPL-MCNC: 7.4 G/DL (ref 6–8)
RBC # BLD AUTO: 4.53 10E6/UL (ref 4.4–5.9)
RBC #/AREA URNS AUTO: NORMAL /HPF
SODIUM SERPL-SCNC: 141 MMOL/L (ref 136–145)
SP GR UR STRIP: >=1.03 (ref 1–1.03)
TSH SERPL DL<=0.005 MIU/L-ACNC: 1.31 UIU/ML (ref 0.3–5)
UROBILINOGEN UR STRIP-ACNC: 0.2 E.U./DL
VIT B12 SERPL-MCNC: 433 PG/ML (ref 213–816)
WBC # BLD AUTO: 5.4 10E3/UL (ref 4–11)
WBC #/AREA URNS AUTO: NORMAL /HPF

## 2021-10-06 PROCEDURE — 85027 COMPLETE CBC AUTOMATED: CPT | Performed by: FAMILY MEDICINE

## 2021-10-06 PROCEDURE — 82306 VITAMIN D 25 HYDROXY: CPT | Performed by: FAMILY MEDICINE

## 2021-10-06 PROCEDURE — 84443 ASSAY THYROID STIM HORMONE: CPT | Performed by: FAMILY MEDICINE

## 2021-10-06 PROCEDURE — 82607 VITAMIN B-12: CPT | Performed by: FAMILY MEDICINE

## 2021-10-06 PROCEDURE — 99214 OFFICE O/P EST MOD 30 MIN: CPT | Mod: 25 | Performed by: FAMILY MEDICINE

## 2021-10-06 PROCEDURE — 90471 IMMUNIZATION ADMIN: CPT | Performed by: FAMILY MEDICINE

## 2021-10-06 PROCEDURE — 36415 COLL VENOUS BLD VENIPUNCTURE: CPT | Performed by: FAMILY MEDICINE

## 2021-10-06 PROCEDURE — 80053 COMPREHEN METABOLIC PANEL: CPT | Performed by: FAMILY MEDICINE

## 2021-10-06 PROCEDURE — 80307 DRUG TEST PRSMV CHEM ANLYZR: CPT | Performed by: FAMILY MEDICINE

## 2021-10-06 PROCEDURE — 90662 IIV NO PRSV INCREASED AG IM: CPT | Performed by: FAMILY MEDICINE

## 2021-10-06 PROCEDURE — 81001 URINALYSIS AUTO W/SCOPE: CPT | Performed by: FAMILY MEDICINE

## 2021-10-06 RX ORDER — GABAPENTIN 300 MG/1
600 CAPSULE ORAL 2 TIMES DAILY
Qty: 120 CAPSULE | Refills: 2 | Status: SHIPPED | OUTPATIENT
Start: 2021-10-06 | End: 2022-03-09

## 2021-10-06 RX ORDER — OLOPATADINE HYDROCHLORIDE 1 MG/ML
1 SOLUTION/ DROPS OPHTHALMIC 2 TIMES DAILY
Qty: 10 ML | Refills: 1 | Status: SHIPPED | OUTPATIENT
Start: 2021-10-06 | End: 2022-06-06

## 2021-10-06 RX ORDER — ACETAMINOPHEN AND CODEINE PHOSPHATE 300; 30 MG/1; MG/1
1 TABLET ORAL EVERY 12 HOURS PRN
Qty: 45 TABLET | Refills: 0 | Status: SHIPPED | OUTPATIENT
Start: 2021-10-06 | End: 2022-12-27

## 2021-10-06 RX ORDER — TRAZODONE HYDROCHLORIDE 50 MG/1
50 TABLET, FILM COATED ORAL AT BEDTIME
Qty: 90 TABLET | Refills: 1 | Status: CANCELLED | OUTPATIENT
Start: 2021-10-06

## 2021-10-06 RX ORDER — CLOTRIMAZOLE AND BETAMETHASONE DIPROPIONATE 10; .64 MG/G; MG/G
CREAM TOPICAL
Qty: 15 G | Refills: 1 | Status: CANCELLED | OUTPATIENT
Start: 2021-10-06

## 2021-10-06 RX ORDER — TRIAMCINOLONE ACETONIDE 0.25 MG/G
CREAM TOPICAL
Qty: 30 G | Refills: 1 | Status: SHIPPED | OUTPATIENT
Start: 2021-10-06 | End: 2022-10-04

## 2021-10-06 RX ORDER — AZELASTINE HYDROCHLORIDE 0.5 MG/ML
SOLUTION/ DROPS OPHTHALMIC
Qty: 6 ML | Refills: 12 | Status: CANCELLED | OUTPATIENT
Start: 2021-10-06

## 2021-10-06 RX ORDER — MECLIZINE HYDROCHLORIDE 25 MG/1
25 TABLET ORAL 3 TIMES DAILY PRN
Qty: 45 TABLET | Refills: 1 | Status: SHIPPED | OUTPATIENT
Start: 2021-10-06 | End: 2022-03-09

## 2021-10-06 RX ORDER — MIRTAZAPINE 15 MG/1
15 TABLET, FILM COATED ORAL AT BEDTIME
Qty: 90 TABLET | Refills: 1 | Status: SHIPPED | OUTPATIENT
Start: 2021-10-06 | End: 2022-03-09

## 2021-10-06 RX ORDER — CAPSAICIN 0.025 %
CREAM (GRAM) TOPICAL 2 TIMES DAILY
Qty: 60 G | Refills: 1 | Status: CANCELLED | OUTPATIENT
Start: 2021-10-06

## 2021-10-06 ASSESSMENT — PATIENT HEALTH QUESTIONNAIRE - PHQ9: SUM OF ALL RESPONSES TO PHQ QUESTIONS 1-9: 9

## 2021-10-06 NOTE — PROGRESS NOTES
Teodoro was seen today for refill request, fatigue, anorexia, rash and covid concern.    Diagnoses and all orders for this visit:    Fatigue, unspecified type  Comments:  With poor appetite.  Rule out organic causes with labs below looking for signs of infection, kidney disease, liver disease, thyroid or anemia. Most likely Poor sleep and depression. Currently on trazodone and Prozac but they can't say if these are helpful. Have been on for awhile. Discussed option of trying mirtazapine and they would like to do. Stop trazodone and prozac.  Orders:  -     UA with Microscopic reflex to Culture - Clinic Collect  -     Comprehensive metabolic panel (BMP + Alb, Alk Phos, ALT, AST, Total. Bili, TP); Future  -     TSH; Future  -     REVIEW OF HEALTH MAINTENANCE PROTOCOL ORDERS  -     Urine Drugs of Abuse Screen Panel 1 - Drug Screen (Full); Future  -     Vitamin D Deficiency; Future  -     Vitamin B12; Future  -     CBC with platelets; Future    Poor appetite: as above start mirtazapine  -     Multiple Vitamin (MULTI VITAMIN) TABS; Take 1 tablet by mouth daily  -     mirtazapine (REMERON) 15 MG tablet; Take 1 tablet (15 mg) by mouth At Bedtime    Insomnia, unspecified type:  As above.   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 (H):  As above  -     mirtazapine (REMERON) 15 MG tablet; Take 1 tablet (15 mg) by mouth At Bedtime    Dry eyes  Comments:  Stable refill provided  Orders:  -     olopatadine (PATANOL) 0.1 % ophthalmic solution; Place 1 drop into both eyes 2 times daily    Bilateral low back pain without sciatica, unspecified chronicity  -     gabapentin (NEURONTIN) 300 MG capsule; Take 2 capsules (600 mg) by mouth 2 times daily  -     acetaminophen-codeine (TYLENOL W/CODEINE #3) 300-30 MG per tablet; Take 1 tablet by mouth every 12 hours as needed for moderate to severe pain    Chronic pain of left  knee  Comments:  Stable on the Tylenol 3's.  This does help his pain when he takes it.  Urine drug screen ordered today for ongoing opioid use  Orders:  -     acetaminophen-codeine (TYLENOL W/CODEINE #3) 300-30 MG per tablet; Take 1 tablet by mouth every 12 hours as needed for moderate to severe pain  -     Urine Drugs of Abuse Screen Panel 1 - Drug Screen (Full); Future    Dizziness  Comments:  Stable on meclizine.  Was seen by my partner back in the spring regarding this and back to baseline with meclizine  Orders:  -     meclizine (ANTIVERT) 25 MG tablet; Take 1 tablet (25 mg) by mouth 3 times daily as needed for dizziness    Other eczema  Comments:  Discontinue the clotrimazole/betamethasone and the capsaicin creams are on his chart to avoid confusion.  We will just do triamcinolone as at this point just lo  Orders:  -     triamcinolone (KENALOG) 0.025 % cream; [TRIAMCINOLONE (KENALOG) 0.025 % CREAM] Apply thin layer to affected area twice a day    Other orders:  Confirmed with granddaughter who is his PCA multiple times that he has not have a flu shot yet this year. There is one listed on [facility] form 9/27/21 but its not even the right one (not a high dose flu shot). Therefore we all agreed to give him a flu shot today.   -     ID FLU VACCINE, INCREASED ANTIGEN, PRESV FREE      Subjective   Teodoro is a 81 year old who presents for the following health issues  accompanied by his grandaughter:    HPI   Want to check his health  Tired and can not eat well.   About 1 month  I just see that he is weak and tired  Sleep is not great  Sometimes the trazodone helps sometimes it does not.  We reviewed all of his medications today as multiple on chart.  3 creams we brought down to just the triamcinolone.  Just needs for eczema.  They were confused about all the other creams I do not think it is worth having.  I have not seen him in about a year and a half before the pandemic.  Granddaughter states that no specific symptoms of  concern to suggest a cause for the tiredness and poor appetite.  Just continues to be an ongoing issue.  Note chest pain shortness of breath, fever, pain with urination, urgency frequency etc.  No abdominal pain.    Review of Systems   Constitutional, HEENT, cardiovascular, pulmonary, GI, , musculoskeletal, neuro, skin, endocrine and psych systems are negative, except as otherwise noted.      Objective    /58 (BP Location: Left arm, Patient Position: Sitting, Cuff Size: Adult Regular)   Pulse 72   Temp 97.8  F (36.6  C) (Temporal)   Resp 16   Wt 51.9 kg (114 lb 8 oz)   BMI 21.63 kg/m    Body mass index is 21.63 kg/m .  Physical Exam   GENERAL: healthy, alert and no distress  NECK: no adenopathy, no asymmetry, masses, or scars and thyroid normal to palpation  RESP: lungs clear to auscultation - no rales, rhonchi or wheezes  CV: regular rate and rhythm, normal S1 S2, no S3 or S4, no murmur, click or rub, no peripheral edema and peripheral pulses strong  ABDOMEN: soft, nontender, no hepatosplenomegaly, no masses and bowel sounds normal  MS: no gross musculoskeletal defects noted, no edema

## 2021-10-07 LAB
AMPHETAMINES UR QL SCN: ABNORMAL
BARBITURATES UR QL: ABNORMAL
BENZODIAZ UR QL: ABNORMAL
CANNABINOIDS UR QL SCN: ABNORMAL
COCAINE UR QL: ABNORMAL
CREAT UR-MCNC: 312 MG/DL
DEPRECATED CALCIDIOL+CALCIFEROL SERPL-MC: 25 UG/L (ref 30–80)
OPIATES UR QL SCN: ABNORMAL
OXYCODONE UR QL: ABNORMAL
PCP UR QL SCN: ABNORMAL

## 2021-10-08 ENCOUNTER — TELEPHONE (OUTPATIENT)
Dept: FAMILY MEDICINE | Facility: CLINIC | Age: 81
End: 2021-10-08

## 2021-10-08 NOTE — TELEPHONE ENCOUNTER
Left message x 1. Please review message thread below and advise the patient as indicated. Please schedule if necessary or indicated in message thread.        ----- Message from Merary Montes DO sent at 10/7/2021 10:49 AM CDT -----  Please call: overall labs are not too bad. Vitamin D is a little low. The multivitamin likely has some Vitamin D in it that will help with this. His blood counts are a bit lower than previous meaning he is anemic. I would like to also test him for iron deficiency and other tests to get to the bottom of that. Does he have any black stools or bleeding in his stools?    Thyroid, B12, urine tests were normal. His kidneys are not functioning as well as they were before likely due to dehydration if he hasn't been eating/drinking very well. We will monitor this. The level he is at would not be uncommon for an 81 year old. I would like to see if the mirtazapine helps with sleep and appetite. Why don't we follow up in 4-6 wks to check in and repeat/do some extra lab work.

## 2022-01-05 ENCOUNTER — PATIENT OUTREACH (OUTPATIENT)
Dept: NURSING | Facility: CLINIC | Age: 82
End: 2022-01-05
Payer: COMMERCIAL

## 2022-01-05 NOTE — PROGRESS NOTES
1/5/2022  Clinic Care Coordination Contact  Community Health Worker Initial Outreach    Patient's granddaughter called and left message needs help what to do next has citizenship interview on 1-27-22 at 10am.    Called with anamika  [place] [id number]  Spoke to [facility] PCA stated that already connected with [facility]  and she will meet them at the immigration office on 1-27-22. They will find Anamika .  [facility] will talk to  for support or if have any questions,.  No support from CC team at this time.    Stated she will CHW later if she needs support.

## 2022-01-27 ENCOUNTER — PATIENT OUTREACH (OUTPATIENT)
Dept: NURSING | Facility: CLINIC | Age: 82
End: 2022-01-27
Payer: COMMERCIAL

## 2022-01-27 NOTE — PROGRESS NOTES
1/27/2022  Clinic Care Coordination Contact  Community Health Worker Initial Outreach    CHW Initial Information Gathering:  Referral Source: Self-patient/Caregiver  Preferred Hospital: [facility]  [phone]  Preferred Urgent Care: [facility], [phone]  Current living arrangement:: I live in a private home with spouse,I live in a private home with family  Type of residence:: Private home - stairs  Community Resources: PCA  Supplies used at home:: None  Equipment Currently Used at Home: walker, rolling  Informal Support system:: Family,Spouse  No PCP office visit in Past Year: No  Transportation means:: Family  CHW Additional Questions  If ED/Hospital discharge, follow-up appointment scheduled as recommended?: N/A  Medication changes made following ED/Hospital discharge?: N/A  MyChart active?: No  Patient agreeable to assistance with activating MyChart?: No    Patient accepts CC: Yes. Patient scheduled for assessment with CC SW on 207022 at 10am. Patient noted desire to discuss complete  ACP Form and citizenship process.       Received call from nisalas/PCA stated they had interview today with the  for citizenship.  staed the he needs the daughter to be with if she is the guardian.   from [facility] will support to clarify guardian.    Conference call to [facility] and spoke to staff regarding  for pt  Quyen Her is the  for the patient.  Spoke to Quyen Her and that the daughter is the designated rep for patient. Daughter was not at the interview today at immigration office only PCA was present.  Daughter is the designated rep needs to be at the interview  Immigration office will reschedule another interview and the designated daughter has to be present at the interview.    Niece PCA will help to connect with daughter and ask her to be at the interview next month.    Need to complete ACP form as soon as possible and fax to Quyen Her when  completed.    CHW Follow up: 2-7-22  review assessment, goals and consult with CCC SW  if needed.

## 2022-01-28 DIAGNOSIS — Z71.89 COUNSELING AND COORDINATION OF CARE: Primary | ICD-10-CM

## 2022-02-07 ENCOUNTER — ALLIED HEALTH/NURSE VISIT (OUTPATIENT)
Dept: NURSING | Facility: CLINIC | Age: 82
End: 2022-02-07
Payer: COMMERCIAL

## 2022-02-07 ENCOUNTER — DOCUMENTATION ONLY (OUTPATIENT)
Dept: OTHER | Facility: CLINIC | Age: 82
End: 2022-02-07

## 2022-02-07 DIAGNOSIS — Z71.89 COUNSELING AND COORDINATION OF CARE: ICD-10-CM

## 2022-02-07 PROCEDURE — 99207 PR NO CHARGE NURSE ONLY: CPT

## 2022-02-07 NOTE — PROGRESS NOTES
2/7/2022  Clinic Care Coordination Contact  Care Team Conversations     Patient re enrolled in [facility] as of  2-7-22 Reviewed assessment, goals, any delegations from CCC SW, and consult with CC SW as needed.    Follow up appt with [facility] RN/[facility] SW:       CHW Follow up: Monthly    CHW Plan: Follow up on goal    CHW Next Follow Up: 3-8-22

## 2022-02-07 NOTE — LETTER
[facility]  Patient Centered Plan of Care  About Me:        Patient Name:  Teodoro Gregorio    YOB: 1940  Age:         82 year old   Fernando MRN:    [id number] Telephone Information:  Home Phone [phone]   Mobile [phone]       Address:  [address]  [place] [address] Email address:  No e-mail address on record      Emergency Contact(s)    Name Relationship Lgl Grd Work Phone Home Phone Mobile Phone   1. DASHAWN, DAY Daughter No   [phone]   2. SUKHJINDER, EH EH Other No   [phone]   3. IAN MARVIN Friend No   [phone]           Primary language:  Anamika     needed? Yes   [facility] [employer]:  [phone] op. 1  Other communication barriers:Language barrier; [place] (Hard of hearing)    Preferred Method of Communication:     Current living arrangement: I live in a private home with family    Mobility Status/ Medical Equipment: Independent w/Device        Health Maintenance  Health Maintenance Reviewed: Not assessed      My Access Plan  Medical Emergency 911   Primary Clinic Line [facility] [phone]   24 Hour Appointment Line [phone] or  [phone]) (toll-free)   24 Hour Nurse Line [phone] (toll-free)   Preferred Urgent Care [facility] [phone]     [facility]  [phone]     Preferred Pharmacy [facility] - [address]     Behavioral Health Crisis Line The National Suicide Prevention Lifeline at 1-513.910.1281 or 911             My Care Team Members  Patient Care Team       Relationship Specialty Notifications Start End    Merary Montes DO PCP - General Family Practice  8/23/19     Phone: [phone] Fax: [phone]         [address] [place] [address]    Merary Montes DO Assigned PCP   10/17/21     Phone: [phone] Fax: [phone]         [address] [place] [address]    Gary Giang, LICSW  Lead Care Coordinator  Admissions 2/7/22     Serene Farias Community Health Worker Primary Care - CC Admissions 2/7/22     Phone: [phone] Fax: [phone]         [address] [place] [address]            My Care Plans  Self Management and Treatment Plan  Goals and (Comments)  Goals        General     Psychosocial (pt-stated)      Notes - Note created  2/7/2022 10:19 AM by Gary Giang [facility]     Goal Statement: I want to continue the process of my citizenship for 1-2 months.  Date Goal set: 02/07/22  Barriers: Language  Strengths:   Date to Achieve By: 4/30/2022  Patient expressed understanding of goal: Yes  Action steps to achieve this goal:  1. I will attend my interview on 3/1/2022  2. I will update CCC team               Action Plans on File:                       Advance Care Plans/Directives Type:   No data recorded    My Medical and Care Information  Problem List   Patient Active Problem List   Diagnosis     Insomnia     Multiple joint pain     Hearing loss     Soft tissue mass     SNHL (sensorineural hearing loss)     Incontinence     Chronic bilateral low back pain without sciatica     Memory difficulty     Mild single current episode of major depressive disorder (H)     Poor dentition      Current Medications and Allergies:  See printed Medication Report.    Care Coordination Start Date: 2/7/2022   Frequency of Care Coordination: monthly     Form Last Updated: 02/07/2022

## 2022-02-07 NOTE — PROGRESS NOTES
Clinic Care Coordination Contact  CCC SW met with Teodoro Gregorio, Lev Green (PCA), and spouse with an  to re-enroll Teodoro Gregorio into [facility].    Advance care plan completed and sent to [employer] for review.     ACP sent to Quyen [facility] .     SW confirmed in [employer] patient still has Blue Plus PMAP due to being on [employer]+ plan.       Clinic Care Coordination Contact  OUTREACH    Referral Information:  Referral Source: Self-patient/Caregiver    Primary Diagnosis: Psychosocial    Chief Complaint   Patient presents with     Clinic Care Coordination - Initial     Clinic Care Coordination - Face To Face        Universal Utilization:   Clinic Utilization  Difficulty keeping appointments:: No  Compliance Concerns: No  No-Show Concerns: No  No PCP office visit in Past Year: No  Utilization    Hospital Admissions  0             ED Visits  0             No Show Count (past year)  2                Current as of: 2/7/2022  2:43 AM              Clinical Concerns:  Current Medical Concerns:  No new concerns    Current Behavioral Concerns: No new concerns    Education Provided to patient: Role of CCC      Health Maintenance Reviewed: Not assessed  Clinical Pathway: None    Medication Management:  Medication review status: Medications reviewed and no changes reported per patient.             Functional Status:  Dependent ADLs:: Ambulation-cane,Bathing,Dressing,Grooming  Mobility Status: Independent w/Device    Living Situation:  Current living arrangement:: I live in a private home with family  Type of residence:: Private home - stairs    Lifestyle & Psychosocial Needs:    Social Determinants of Health     Tobacco Use: High Risk     Smoking Tobacco Use: Former Smoker     Smokeless Tobacco Use: Current User   Alcohol Use: Not on file   Financial Resource Strain: Not on file   Food Insecurity: Not on file   Transportation Needs: Not on file   Physical Activity: Not on file   Stress: Not on file   Social Connections: Not on  file   Intimate Partner Violence: Not on file   Depression: Not at risk     PHQ-2 Score: 1   Housing Stability: Not on file     Diet:: Regular  Inadequate nutrition (GOAL):: No  Tube Feeding: No  Inadequate activity/exercise (GOAL):: No  Significant changes in sleep pattern (GOAL): No  Transportation means:: Family (niece/PCA support to appt)     Druze or spiritual beliefs that impact treatment:: No  Chemical Dependency Status: No Current Concerns  Informal Support system:: Family,Spouse             Resources and Interventions:  Current Resources:      Community Resources: PCA     Equipment Currently Used at Home: cane, straight  Employment Status: disabled         Advance Care Plan/Directive  Advanced Care Plans/Directives on file:: In process  Advanced Care Plan/Directive Status: In Process          Goals:   Goals        General     Psychosocial (pt-stated)      Notes - Note created  2/7/2022 10:19 AM by Gary Giang, [facility]     Goal Statement: I want to continue the process of my citizenship for 1-2 months.  Date Goal set: 02/07/22  Barriers: Language  Strengths:   Date to Achieve By: 4/30/2022  Patient expressed understanding of goal: Yes  Action steps to achieve this goal:  1. I will attend my interview on 3/1/2022  2. I will update CCC team              Patient/Caregiver understanding: Reported understanding     Outreach Frequency: monthly      Plan: Standard outreach

## 2022-03-07 ENCOUNTER — TELEPHONE (OUTPATIENT)
Dept: CARE COORDINATION | Facility: CLINIC | Age: 82
End: 2022-03-07

## 2022-03-07 ENCOUNTER — PATIENT OUTREACH (OUTPATIENT)
Dept: NURSING | Facility: CLINIC | Age: 82
End: 2022-03-07
Payer: COMMERCIAL

## 2022-03-07 NOTE — PROGRESS NOTES
3/7/2022  Clinic Care Coordination Contact    Community Health Worker Follow Up    Care Gaps:     Health Maintenance Due   Topic Date Due     DEPRESSION ACTION PLAN  Never done     ZOSTER IMMUNIZATION (1 of 2) Never done     MEDICARE ANNUAL WELLNESS VISIT  02/19/2021     PHQ-9  04/06/2022       Care Gap Goal set: Yes  3-9-22    Goals:   Goals Addressed as of 3/7/2022 at 9:50 AM                    Today       Psychosocial (pt-stated)   80%    Added 2/7/22 by Gary Giang, [facility]      Goal Statement: I want to continue the process of my citizenship for 1-2 months.  Date Goal set: 02/07/22  Barriers: Language  Strengths:   Date to Achieve By: 4/30/2022  Patient expressed understanding of goal: Yes  Action steps to achieve this goal:  1. Niece and I will drop off the letter on 3-9-22 at 1:40pm at the clinic.  2. Niece will talk to KRUNAL TINAJERO 3-11-22 at 2pm for support to review the letter.    Updated: 3-7-22            Intervention and Education during outreach:   Anamika : Dave Boo ID# [id number]    Received call from patient's niece Eh Say. Stated he failed the citizenship test on 3-1-22.  The daughter could not speak on his behalf  Hard time to hear not that he cant speak.   was on the phone so he cant hear the .  So the  said the medical waiver form said  he can't speak but he can speak it just that he can't hear that well.  Drop off letter on 3-9-22 at 1:40pm when he see the doctor.    Scheduled appt with KRUNAL TINAJERO on 3-11-22 for support with the letter and citizenship    Patient's niece agreed to support scheduling  AWV.  CHW sent telephone message to PCP Care Team [place] for support with updating appt on 3-9-22 to Medicare Wellness Visit.    KRUNAL TINAJERO 3-11-22 at 2pm  CHW Follow up: Monthly    CHW Plan: Follow up on goals    CHW Next Follow Up: 4-11-22

## 2022-03-07 NOTE — TELEPHONE ENCOUNTER
3/7/2022  Patient's niece Lev Green wondering if he could do the Medicare Wellness Visit when he comes to see Dr Montes on 3-9-22 at 1:40pm

## 2022-03-09 ENCOUNTER — OFFICE VISIT (OUTPATIENT)
Dept: FAMILY MEDICINE | Facility: CLINIC | Age: 82
End: 2022-03-09
Payer: COMMERCIAL

## 2022-03-09 VITALS
TEMPERATURE: 98.5 F | HEART RATE: 66 BPM | DIASTOLIC BLOOD PRESSURE: 74 MMHG | SYSTOLIC BLOOD PRESSURE: 111 MMHG | OXYGEN SATURATION: 97 % | WEIGHT: 119.4 LBS | HEIGHT: 60 IN | BODY MASS INDEX: 23.44 KG/M2

## 2022-03-09 DIAGNOSIS — D64.9 ANEMIA, UNSPECIFIED TYPE: ICD-10-CM

## 2022-03-09 DIAGNOSIS — E55.9 VITAMIN D INSUFFICIENCY: ICD-10-CM

## 2022-03-09 DIAGNOSIS — Z00.00 ENCOUNTER FOR MEDICARE ANNUAL WELLNESS EXAM: Primary | ICD-10-CM

## 2022-03-09 DIAGNOSIS — M79.644 CHRONIC PAIN OF RIGHT THUMB: ICD-10-CM

## 2022-03-09 DIAGNOSIS — M54.50 BILATERAL LOW BACK PAIN WITHOUT SCIATICA, UNSPECIFIED CHRONICITY: ICD-10-CM

## 2022-03-09 DIAGNOSIS — G89.29 CHRONIC PAIN OF RIGHT THUMB: ICD-10-CM

## 2022-03-09 DIAGNOSIS — F51.01 PRIMARY INSOMNIA: ICD-10-CM

## 2022-03-09 DIAGNOSIS — R63.0 POOR APPETITE: ICD-10-CM

## 2022-03-09 DIAGNOSIS — F32.0 MILD SINGLE CURRENT EPISODE OF MAJOR DEPRESSIVE DISORDER (H): ICD-10-CM

## 2022-03-09 DIAGNOSIS — R42 DIZZINESS: ICD-10-CM

## 2022-03-09 LAB
ANION GAP SERPL CALCULATED.3IONS-SCNC: 11 MMOL/L (ref 5–18)
BUN SERPL-MCNC: 18 MG/DL (ref 8–28)
CALCIUM SERPL-MCNC: 9.5 MG/DL (ref 8.5–10.5)
CHLORIDE BLD-SCNC: 105 MMOL/L (ref 98–107)
CO2 SERPL-SCNC: 24 MMOL/L (ref 22–31)
CREAT SERPL-MCNC: 0.95 MG/DL (ref 0.7–1.3)
ERYTHROCYTE [DISTWIDTH] IN BLOOD BY AUTOMATED COUNT: 14.6 % (ref 10–15)
FERRITIN SERPL-MCNC: 72 NG/ML (ref 27–300)
GFR SERPL CREATININE-BSD FRML MDRD: 79 ML/MIN/1.73M2
GLUCOSE BLD-MCNC: 69 MG/DL (ref 70–125)
HCT VFR BLD AUTO: 37.9 % (ref 40–53)
HGB BLD-MCNC: 13.1 G/DL (ref 13.3–17.7)
MCH RBC QN AUTO: 26.9 PG (ref 26.5–33)
MCHC RBC AUTO-ENTMCNC: 34.6 G/DL (ref 31.5–36.5)
MCV RBC AUTO: 78 FL (ref 78–100)
PLATELET # BLD AUTO: 200 10E3/UL (ref 150–450)
POTASSIUM BLD-SCNC: 4.4 MMOL/L (ref 3.5–5)
RBC # BLD AUTO: 4.87 10E6/UL (ref 4.4–5.9)
SODIUM SERPL-SCNC: 140 MMOL/L (ref 136–145)
WBC # BLD AUTO: 7.4 10E3/UL (ref 4–11)

## 2022-03-09 PROCEDURE — 85027 COMPLETE CBC AUTOMATED: CPT | Performed by: FAMILY MEDICINE

## 2022-03-09 PROCEDURE — 80048 BASIC METABOLIC PNL TOTAL CA: CPT | Performed by: FAMILY MEDICINE

## 2022-03-09 PROCEDURE — 82306 VITAMIN D 25 HYDROXY: CPT | Performed by: FAMILY MEDICINE

## 2022-03-09 PROCEDURE — 83550 IRON BINDING TEST: CPT | Performed by: FAMILY MEDICINE

## 2022-03-09 PROCEDURE — 82728 ASSAY OF FERRITIN: CPT | Performed by: FAMILY MEDICINE

## 2022-03-09 PROCEDURE — 36415 COLL VENOUS BLD VENIPUNCTURE: CPT | Performed by: FAMILY MEDICINE

## 2022-03-09 PROCEDURE — 99213 OFFICE O/P EST LOW 20 MIN: CPT | Mod: 25 | Performed by: FAMILY MEDICINE

## 2022-03-09 PROCEDURE — 99397 PER PM REEVAL EST PAT 65+ YR: CPT | Performed by: FAMILY MEDICINE

## 2022-03-09 RX ORDER — MIRTAZAPINE 15 MG/1
15 TABLET, FILM COATED ORAL AT BEDTIME
Qty: 90 TABLET | Refills: 3 | Status: SHIPPED | OUTPATIENT
Start: 2022-03-09 | End: 2023-01-05

## 2022-03-09 RX ORDER — GABAPENTIN 300 MG/1
600 CAPSULE ORAL 2 TIMES DAILY
Qty: 360 CAPSULE | Refills: 1 | Status: SHIPPED | OUTPATIENT
Start: 2022-03-09 | End: 2023-03-30

## 2022-03-09 RX ORDER — MECLIZINE HYDROCHLORIDE 25 MG/1
25 TABLET ORAL 3 TIMES DAILY PRN
Qty: 90 TABLET | Refills: 3 | Status: SHIPPED | OUTPATIENT
Start: 2022-03-09 | End: 2022-10-04

## 2022-03-09 RX ORDER — RAMELTEON 8 MG/1
8 TABLET ORAL AT BEDTIME
Qty: 30 TABLET | Refills: 1 | Status: SHIPPED | OUTPATIENT
Start: 2022-03-09 | End: 2022-06-06

## 2022-03-09 ASSESSMENT — ACTIVITIES OF DAILY LIVING (ADL)
CURRENT_FUNCTION: LAUNDRY REQUIRES ASSISTANCE
CURRENT_FUNCTION: TELEPHONE REQUIRES ASSISTANCE
CURRENT_FUNCTION: SHOPPING REQUIRES ASSISTANCE
CURRENT_FUNCTION: BATHING REQUIRES ASSISTANCE
CURRENT_FUNCTION: HOUSEWORK REQUIRES ASSISTANCE
CURRENT_FUNCTION: MEDICATION ADMINISTRATION REQUIRES ASSISTANCE
CURRENT_FUNCTION: TRANSPORTATION REQUIRES ASSISTANCE
CURRENT_FUNCTION: PREPARING MEALS REQUIRES ASSISTANCE
CURRENT_FUNCTION: MONEY MANAGEMENT REQUIRES ASSISTANCE

## 2022-03-09 NOTE — PROGRESS NOTES
"  {PROVIDER CHARTING PREFERENCE:062272}    Subjective   Teodoro is a 82 year old who presents for the following health issues {ACCOMPANIED BY STATEMENT (Optional):889608}    HPI   My gradnfaterh  Feels dizzy then he gets sweaty  These symptoms happened twice last week.   When he feels dizzy thhe has pain on neck/sore  Neck feels heavy  Neck pain then dizziness  Is better with meclizine  Thumb pain- fell and landed on thumb- swelling, icing helped  This was November  Taking tylenol takes tylenol   Told her not to tell the docotro right htumb      {SUPERLIST (Optional):282216}  {additonal problems for provider to add (Optional):207247}    Review of Systems   {ROS COMP (Optional):502906}      Objective    /74 (BP Location: Left arm, Patient Position: Sitting, Cuff Size: Adult Regular)   Pulse 66   Temp 98.5  F (36.9  C) (Temporal)   Ht 1.534 m (5' 0.39\")   Wt 54.2 kg (119 lb 6.4 oz)   SpO2 97%   BMI 23.02 kg/m    Body mass index is 23.02 kg/m .  Physical Exam   {Exam List (Optional):766750}    {Diagnostic Test Results (Optional):038517}    {AMBULATORY ATTESTATION (Optional):265878}        "

## 2022-03-09 NOTE — PROGRESS NOTES
"SUBJECTIVE:   Teodoro Gregorio is a 82 year old male who presents for Preventive Visit.      Patient has been advised of split billing requirements and indicates understanding: Yes      Healthy Habits:     In general, how would you rate your overall health?  Fair    Frequency of exercise:  2-3 days/week    Duration of exercise:  Less than 15 minutes    Do you usually eat at least 4 servings of fruit and vegetables a day, include whole grains    & fiber and avoid regularly eating high fat or \"junk\" foods?  No    Taking medications regularly:  Yes    Medication side effects:  Not applicable    Ability to successfully perform activities of daily living:  Telephone requires assistance, transportation requires assistance, shopping requires assistance, preparing meals requires assistance, housework requires assistance, bathing requires assistance, laundry requires assistance, medication administration requires assistance and money management requires assistance    Home Safety:  No safety concerns identified    Hearing Impairment:  Difficulty following a conversation in a noisy restaurant or crowded room, feel that people are mumbling or not speaking clearly, difficulty following dialogue in the theater, difficult to understand a speaker at a public meeting or [employer] service, need to ask people to speak up or repeat themselves, find that men's voices are easier to understand than woman's, difficulty understanding soft or whispered speech and difficulty understanding speech on the telephone    In the past 6 months, have you been bothered by leaking of urine? Yes    In general, how would you rate your overall mental or emotional health?  Fair      PHQ-2 Total Score: 2    Additional concerns today:  Yes    Do you feel safe in your environment? Yes    Have you ever done Advance Care Planning? (For example, a Health Directive, POLST, or a discussion with a medical provider or your loved ones about your wishes): Yes, advance care " planning is on file.       Fall risk       Cognitive Screening   1) Repeat 3 items (Leader, Season, Table)    2) Clock draw: ABNORMAL unable to do  3) 3 item recall: Recalls NO objects   Results: 0 items recalled: PROBABLE COGNITIVE IMPAIRMENT, **INFORM PROVIDER**    Mini-CogTM Copyright ALFREDO Santoyo. Licensed by the author for use in [facility]; reprinted with permission ([email or web address]). All rights reserved.      Do you have sleep apnea, excessive snoring or daytime drowsiness?: no    Reviewed and updated as needed this visit by clinical staff   Tobacco  Allergies  Meds              Reviewed and updated as needed this visit by Provider                 Social History     Tobacco Use     Smoking status: Former Smoker     Smokeless tobacco: Current User     Types: Chew     Tobacco comment: betel nut   Substance Use Topics     Alcohol use: No     If you drink alcohol do you typically have >3 drinks per day or >7 drinks per week? No    Alcohol Use 3/9/2022   Prescreen: >3 drinks/day or >7 drinks/week? No   Prescreen: >3 drinks/day or >7 drinks/week? -   No flowsheet data found.      Here for AWV  My grandfather  Feels dizzy then he gets sweaty  These symptoms happened twice last week.   When he feels dizzy then has pain on neck/sore  Neck feels heavy  Clarified- gets Neck pain then dizziness  Is better with meclizine  No CP or SOB  Thumb pain- fell and landed on thumb- swelling, icing helped  This was November  Taking tylenol   Told her not to tell the docotor right thumb. Not that worried about it  Needs something for sleep- up all night.     Current providers sharing in care for this patient include:   Patient Care Team:  Merary Montes DO as PCP - General (Family Practice)  Merary Montes DO as Assigned PCP  Gary Giang LICSW as Lead Care Coordinator  Serene Farias as Community Health Worker (Primary Care - CC)    The following health maintenance items are reviewed in Epic and correct as of  "today:  Health Maintenance Due   Topic Date Due     DEPRESSION ACTION PLAN  Never done     ZOSTER IMMUNIZATION (1 of 2) Never done     PHQ-9  04/06/2022       Review of Systems  Constitutional, HEENT, cardiovascular, pulmonary, GI, , musculoskeletal, neuro, skin, endocrine and psych systems are negative, except as otherwise noted.    OBJECTIVE:   /74 (BP Location: Left arm, Patient Position: Sitting, Cuff Size: Adult Regular)   Pulse 66   Temp 98.5  F (36.9  C) (Temporal)   Ht 1.534 m (5' 0.39\")   Wt 54.2 kg (119 lb 6.4 oz)   SpO2 97%   BMI 23.02 kg/m   Estimated body mass index is 23.02 kg/m  as calculated from the following:    Height as of this encounter: 1.534 m (5' 0.39\").    Weight as of this encounter: 54.2 kg (119 lb 6.4 oz).  Physical Exam  GENERAL: alert, no distress and elderly  EYES: Eyes grossly normal to inspection, PERRL and conjunctivae and sclerae normal  HENT: ear canals and TM's normal, nose and mouth without ulcers or lesions  NECK: no adenopathy, no asymmetry, masses, or scars and thyroid normal to palpation, no carotid bruits.  RESP: lungs clear to auscultation - no rales, rhonchi or wheezes  CV: regular rate and rhythm, normal S1 S2, no S3 or S4, no murmur, click or rub, no peripheral edema  ABDOMEN: soft, nontender, no hepatosplenomegaly, no masses and bowel sounds normal  MS: limited mobility getting on and off table. Needs help, uses cane to ambulate at baseline, straight leg test negative. TTP across low back  no edema. Unable to bend the distal joint of right thumb as well as the left thumb. Right thumb seems wider but not necessarily swelling.   SKIN: no suspicious lesions or rashes  NEURO: no gross deficits  PSYCH: mentation appears normal, fatigued due to age    Diagnostic Test Results:  Labs reviewed in Epic    ASSESSMENT / PLAN:   Teodoro was seen today for dizziness and refill.    Diagnoses and all orders for this visit:    Encounter for Medicare annual wellness " exam    Primary insomnia  Comments:  mirtazapine not helping with sleep- discussed works better for sleep at the 7.5mg dose. they request something else in addition. Stated he is up all night. Will try rozerem. has helped his wife a lot.   Orders:  -     ramelteon (ROZEREM) 8 MG tablet; Take 1 tablet (8 mg) by mouth At Bedtime    Dizziness  Comments:  chronic issue. Stable on meclizine.  reports neck pain-> dizziness. i think its probably tension HA related. recommend heat, massage. no carotid bruits noted. No syncopal episodes or related CP or SOB to suggest more concerning pathology.   Orders:  -     mirtazapine (REMERON) 15 MG tablet; Take 1 tablet (15 mg) by mouth At Bedtime  -     meclizine (ANTIVERT) 25 MG tablet; Take 1 tablet (25 mg) by mouth 3 times daily as needed for dizziness    Poor appetite  Comments:  improved on mirtazapine. Check labs. Continue multi vitamin.   Orders:  -     mirtazapine (REMERON) 15 MG tablet; Take 1 tablet (15 mg) by mouth At Bedtime  -     Multiple Vitamin (MULTI VITAMIN) TABS; Take 1 tablet by mouth daily  -     Basic metabolic panel  (Ca, Cl, CO2, Creat, Gluc, K, Na, BUN); Future  -     Basic metabolic panel  (Ca, Cl, CO2, Creat, Gluc, K, Na, BUN)    Bilateral low back pain without sciatica, unspecified chronicity  Comments:  stable. continue gabapentin  Orders:  -     gabapentin (NEURONTIN) 300 MG capsule; Take 2 capsules (600 mg) by mouth 2 times daily    Vitamin D insufficiency  Comments:  check lab today. was a little low last fall.   Orders:  -     Vitamin D Deficiency; Future  -     Vitamin D Deficiency    Anemia, unspecified type  Comments:  recheck labs today. low hemogobin in October. recommended f/u in 4-6 weeks for recheck labs. Assess for iron def  Orders:  -     Iron and iron binding capacity; Future  -     Ferritin; Future  -     CBC with platelets; Future  -     Iron and iron binding capacity  -     Ferritin  -     CBC with platelets    Mild single current  "episode of major depressive disorder (H)  Comments:  stable. continue mirtazapine.    Orders:  -     mirtazapine (REMERON) 15 MG tablet; Take 1 tablet (15 mg) by mouth At Bedtime    Chronic pain of right thumb  Comments:  can't bend distal joint as well as left side. he declines xray today. he isn't that concerned about it. i recommend to keep icing a couple times a day and take tylenol as needed. Do some gentle stretching to increase mobility          COUNSELING:  Reviewed preventive health counseling, as reflected in patient instructions       Consider AAA screening for ages 65-75 and smoking history       Regular exercise       Healthy diet/nutrition       Vision screening       Dental care       Bladder control       Fall risk prevention    Estimated body mass index is 23.02 kg/m  as calculated from the following:    Height as of this encounter: 1.534 m (5' 0.39\").    Weight as of this encounter: 54.2 kg (119 lb 6.4 oz).        He reports that he has quit smoking. His smokeless tobacco use includes chew.      Appropriate preventive services were discussed with this patient, including applicable screening as appropriate for cardiovascular disease, diabetes, osteopenia/osteoporosis, and glaucoma.  As appropriate for age/gender, discussed screening for colorectal cancer, prostate cancer, breast cancer, and cervical cancer. Checklist reviewing preventive services available has been given to the patient.    Reviewed patients plan of care and provided an AVS. The Complex Care Plan (for patients with higher acuity and needing more deliberate coordination of services) for Teodoro meets the Care Plan requirement. This Care Plan has been established and reviewed with the Patient and other:grandaidater/PCA.    Counseling Resources:  ATP IV Guidelines  Pooled Cohorts Equation Calculator  Breast Cancer Risk Calculator  Breast Cancer: Medication to Reduce Risk  FRAX Risk Assessment  ICSI Preventive Guidelines  Dietary " Guidelines for Americans, 2010  USDA's MyPlate  ASA Prophylaxis  Lung CA Screening    Merary Montes DO  [facility]     Identified Health Risks:    The patient was provided with suggestions to help him develop a healthy physical lifestyle.  The patient was counseled and encouraged to consider modifying their diet and eating habits. He was provided with information on recommended healthy diet options.  The patient reports that he has difficulty with activities of daily living. I have asked that the patient make a follow up appointment in 12 weeks where this issue will be further evaluated and addressed.  The patient was provided with written information regarding signs of hearing loss.  Information on urinary incontinence and treatment options given to patient.  The patient was provided with suggestions to help him develop a healthy emotional lifestyle.

## 2022-03-10 ENCOUNTER — TELEPHONE (OUTPATIENT)
Dept: FAMILY MEDICINE | Facility: CLINIC | Age: 82
End: 2022-03-10
Payer: COMMERCIAL

## 2022-03-10 LAB
DEPRECATED CALCIDIOL+CALCIFEROL SERPL-MC: 29 UG/L (ref 30–80)
IRON SATN MFR SERPL: 28 % (ref 15–46)
IRON SERPL-MCNC: 89 UG/DL (ref 35–180)
TIBC SERPL-MCNC: 319 UG/DL (ref 240–430)

## 2022-03-10 NOTE — TELEPHONE ENCOUNTER
----- Message from Merayr Montes DO sent at 3/10/2022 11:27 AM CST -----  Vitamin D has improved as well as his hemoglobin level is almost back to normal. His kidney function has also significantly improved. I think these were due to poor appetite. I think the multivitamin is helping.

## 2022-03-10 NOTE — TELEPHONE ENCOUNTER
Pt's PCA Eh Eh (c2c on file) called back. I relay msg below to PCA, she is very thankful for the call. [facility] will relay msg to pt. Completing task.

## 2022-03-10 NOTE — PATIENT INSTRUCTIONS
Patient Education   Personalized Prevention Plan  You are due for the preventive services outlined below.  Your care team is available to assist you in scheduling these services.  If you have already completed any of these items, please share that information with your care team to update in your medical record.  Health Maintenance Due   Topic Date Due     Depression Action Plan  Never done     Zoster (Shingles) Vaccine (1 of 2) Never done     Depression Assessment  04/06/2022     Your Health Risk Assessment indicates you feel you are not in good health    A healthy lifestyle helps keep the body fit and the mind alert. It helps protect you from disease, helps you fight disease, and helps prevent chronic disease (disease that doesn't go away) from getting worse. This is important as you get older and begin to notice twinges in muscles and joints and a decline in the strength and stamina you once took for granted. A healthy lifestyle includes good healthcare, good nutrition, weight control, recreation, and regular exercise. Avoid harmful substances and do what you can to keep safe. Another part of a healthy lifestyle is stay mentally active and socially involved.    Good healthcare     Have a wellness visit every year.     If you have new symptoms, let us know right away. Don't wait until the next checkup.     Take medicines exactly as prescribed and keep your medicines in a safe place. Tell us if your medicine causes problems.   Healthy diet and weight control     Eat 3 or 4 small, nutritious, low-fat, high-fiber meals a day. Include a variety of fruits, vegetables, and whole-grain foods.     Make sure you get enough calcium in your diet. Calcium, vitamin D, and exercise help prevent osteoporosis (bone thinning).     If you live alone, try eating with others when you can. That way you get a good meal and have company while you eat it.     Try to keep a healthy weight. If you eat more calories than your body uses  for energy, it will be stored as fat and you will gain weight.     Recreation   Recreation is not limited to sports and team events. It includes any activity that provides relaxation, interest, enjoyment, and exercise. Recreation provides an outlet for physical, mental, and social energy. It can give a sense of worth and achievement. It can help you stay healthy.    Mental Exercise and Social Involvement  Mental and emotional health is as important as physical health. Keep in touch with friends and family. Stay as active as possible. Continue to learn and challenge yourself.   Things you can do to stay mentally active are:    Learn something new, like a foreign language or musical instrument.     Play SCRABBLE or do crossword puzzles. If you cannot find people to play these games with you at home, you can play them with others on your computer through the Internet.     Join a games club--anything from card games to chess or checkers or lawn bowling.     Start a new hobby.     Go back to school.     Volunteer.     Read.   Keep up with world events.    Understanding USDA MyPlate  The USDA has guidelines to help you make healthy food choices. These are called MyPlate. MyPlate shows the food groups that make up healthy meals using the image of a place setting. Before you eat, think about the healthiest choices for what to put on your plate or in your cup or bowl. To learn more about building a healthy plate, visit www.choosemyplate.gov.    The food groups    Fruits. Any fruit or 100% fruit juice counts as part of the Fruit Group. Fruits may be fresh, canned, frozen, or dried, and may be whole, cut-up, or pureed. Make 1/2 of your plate fruits and vegetables.    Vegetables. Any vegetable or 100% vegetable juice counts as a member of the Vegetable Group. Vegetables may be fresh, frozen, canned, or dried. They can be served raw or cooked and may be whole, cut-up, or mashed. Make 1/2 of your plate fruits and  vegetables.    Grains. All foods made from grains are part of the Grains Group. These include wheat, rice, oats, cornmeal, and barley. Grains are often used to make foods such as bread, pasta, oatmeal, cereal, tortillas, and grits. Grains should be no more than 1/4 of your plate. At least half of your grains should be whole grains.    Protein. This group includes meat, poultry, seafood, beans and peas, eggs, processed soy products (such as tofu), nuts (including nut butters), and seeds. Make protein choices no more than 1/4 of your plate. Meat and poultry choices should be lean or low fat.    Dairy. The Dairy Group includes all fluid milk products and foods made from milk that contain calcium, such as yogurt and cheese. (Foods that have little calcium, such as cream, butter, and cream cheese, are not part of this group.) Most dairy choices should be low-fat or fat-free.    Oils. Oils aren't a food group, but they do contain essential nutrients. However it's important to watch your intake of oils. These are fats that are liquid at room temperature. They include canola, corn, olive, soybean, vegetable, and sunflower oil. Foods that are mainly oil include mayonnaise, certain salad dressings, and soft margarines. You likely already get your daily oil allowance from the foods you eat.  Things to limit  Eating healthy also means limiting these things in your diet:       Salt (sodium). Many processed foods have a lot of sodium. To keep sodium intake down, eat fresh vegetables, meats, poultry, and seafood when possible. Purchase low-sodium, reduced-sodium, or no-salt-added food products at the store. And don't add salt to your meals at home. Instead, season them with herbs and spices such as dill, oregano, cumin, and paprika. Or try adding flavor with lemon or lime zest and juice.    Saturated fat. Saturated fats are most often found in animal products such as beef, pork, and chicken. They are often solid at room  temperature, such as butter. To reduce your saturated fat intake, choose leaner cuts of meat and poultry. And try healthier cooking methods such as grilling, broiling, roasting, or baking. For a simple lower-fat swap, use plain nonfat yogurt instead of mayonnaise when making potato salad or macaroni salad.    Added sugars. These are sugars added to foods. They are in foods such as ice cream, candy, soda, fruit drinks, sports drinks, energy drinks, cookies, pastries, jams, and syrups. Cut down on added sugars by sharing sweet treats with a family member or friend. You can also choose fruit for dessert, and drink water or other unsweetened beverages.     [employer] last reviewed this educational content on 6/1/2020 2000-2021 [employer]. All rights reserved. This information is not intended as a substitute for professional medical care. Always follow your healthcare professional's instructions.        Activities of Daily Living    Your Health Risk Assessment indicates you have difficulties with activities of daily living such as housework, bathing, preparing meals, taking medication, etc. Please make a follow up appointment for us to address this issue in more detail.    Signs of Hearing Loss      Hearing much better with one ear can be a sign of hearing loss.   Hearing loss is a problem shared by many people. In fact, it is one of the most common health problems, particularly as people age. Most people age 65 and older have some hearing loss. By age 80, almost everyone does. Hearing loss often occurs slowly over the years. So you may not realize your hearing has gotten worse.  Have your hearing checked  Call your healthcare provider if you:    Have to strain to hear normal conversation    Have to watch other people s faces very carefully to follow what they re saying    Need to ask people to repeat what they ve said    Often misunderstand what people are saying    Turn the volume of the television or  radio up so high that others complain    Feel that people are mumbling when they re talking to you    Find that the effort to hear leaves you feeling tired and irritated    Notice, when using the phone, that you hear better with one ear than the other  [employer] last reviewed this educational content on 1/1/2020 2000-2021 [employer]. All rights reserved. This information is not intended as a substitute for professional medical care. Always follow your healthcare professional's instructions.          Urinary Incontinence (Male)    Urinary incontinence means not being able to control the release of urine from the bladder.   Causes  Common causes of urinary incontinence in men include:    Infection    Certain medicines    Aging    Poor pelvic muscle tone    Bladder spasms    Obesity    Trouble urinating and fully emptying the bladder (urinary retention)  Other things that can cause incontinence are:     Nervous system diseases    Diabetes    Sleep apnea    Urinary tract infections    Prostate surgery    Pelvic injury  Constipation and smoking have also been identified as risk factors.   Symptoms    Urge incontinence (overactive bladder). This is a sudden urge to urinate. It occurs even though there may not be much urine in the bladder. The need to urinate often during the night is common. It's due to bladder spasms.    Stress incontinence. This is urine leakage that you can't control. It can occur with sneezing, coughing, and other actions that put stress on the bladder.    Treatment  Treatment depends on what is causing the condition. Bladder infections are treated with antibiotics. Urinary retention is treated with a bladder catheter.   Home care  Follow these guidelines when caring for yourself at home:    Don't have any foods and drinks that may irritate the bladder. This includes:  ? Chocolate  ? Alcohol  ? Caffeine  ? Carbonated drinks  ? Acidic fruits and juices    Limit fluids to 6 to 8 cups a  day.    Lose weight if you are overweight. This will reduce your symptoms.    If advised, do regular pelvic muscle-strengthening exercises such as Kegel exercises.    If needed, wear absorbent pads to catch urine. Change the pads often. This is for good hygiene and to prevent skin and bladder infections.    Bathe daily for good hygiene.    If an antibiotic was prescribed to treat a bladder infection, take it until it's finished. Keep taking it even if you are feeling better. This is to make sure your infection has cleared.    If a catheter was left in place, keep bacteria from getting into the collection bag. Don't disconnect the catheter from the collection bag.    Use a leg band to secure the catheter drainage tube, so it does not pull on the catheter. Drain the collection bag when it becomes full. To do this, use the drain spout at the bottom of the bag. Don't disconnect the bag from the catheter.    Don't pull on or try to remove a catheter. The catheter must be removed by a healthcare provider.    If you smoke, stop. Ask your provider for help if you can't do this on your own.  Follow-up care  Follow up with your healthcare provider, or as advised.  When to get medical advice  Call your healthcare provider right away if any of these occur:    Fever over 100.4 F (38 C), or as directed by your provider    Bladder pain or fullness    Belly swelling, nausea, or vomiting    Back pain    Weakness, dizziness, or fainting    If a catheter was left in place, return if:  ? The catheter falls out  ? The catheter stops draining for 6 hours  ? Your urine gets cloudy or smells bad  [employer] last reviewed this educational content on 1/1/2020 2000-2021 [employer]. All rights reserved. This information is not intended as a substitute for professional medical care. Always follow your healthcare professional's instructions.        Your Health Risk Assessment indicates you feel you are not in good emotional  health.    Recreation   Recreation is not limited to sports and team events. It includes any activity that provides relaxation, interest, enjoyment, and exercise. Recreation provides an outlet for physical, mental, and social energy. It can give a sense of worth and achievement. It can help you stay healthy.    Mental Exercise and Social Involvement  Mental and emotional health is as important as physical health. Keep in touch with friends and family. Stay as active as possible. Continue to learn and challenge yourself.   Things you can do to stay mentally active are:    Learn something new, like a foreign language or musical instrument.     Play SCRABBLE or do crossword puzzles. If you cannot find people to play these games with you at home, you can play them with others on your computer through the Internet.     Join a games club--anything from card games to chess or checkers or lawn bowling.     Start a new hobby.     Go back to school.     Volunteer.     Read.   Keep up with world events.

## 2022-03-10 NOTE — TELEPHONE ENCOUNTER
Called pt and left VM to call clinic. 1st attempt.  Ok to relay below to pt when calls back. thanks              Merary Montes, DO JOSELUIS Reagan Care Team Pool  Vitamin D has improved as well as his hemoglobin level is almost back to normal. His kidney function has also significantly improved. I think these were due to poor appetite. I think the multivitamin is helping.

## 2022-03-11 ENCOUNTER — PATIENT OUTREACH (OUTPATIENT)
Dept: NURSING | Facility: CLINIC | Age: 82
End: 2022-03-11
Payer: COMMERCIAL

## 2022-03-11 NOTE — PROGRESS NOTES
Clinic Care Coordination Contact  Care Team Conversations    SW did not receive letter for review, will chart review next week.

## 2022-03-18 ENCOUNTER — PATIENT OUTREACH (OUTPATIENT)
Dept: NURSING | Facility: CLINIC | Age: 82
End: 2022-03-18
Payer: COMMERCIAL

## 2022-03-18 NOTE — PROGRESS NOTES
Clinic Care Coordination Contact  Care Team Conversations    CCC SW attempted to reach patient, no answer, left voicemail. Patient needs to fill out forms for USCIS that are due by  April 4th in the mail. Need to designate a family member to speak on behalf of him at his next oath ceremony.

## 2022-03-18 NOTE — PROGRESS NOTES
"Clinic Care Coordination Contact  Care Team Conversations    CCC LIOR spoke with Lev Green with an . We reviewed the USCIS letter. It seems since she is not a relative, she is not going to be able to be Teodoro Gregorio's designated representative. She thinks his daughter could be and I believe so as well, however there is one criteria that she doesn't meet which is \"provides primary shelter care and responsibility\".     SW sent urgent message to Quyen Her to provide advice on how to proceed.     "

## 2022-03-22 ENCOUNTER — PATIENT OUTREACH (OUTPATIENT)
Dept: NURSING | Facility: CLINIC | Age: 82
End: 2022-03-22
Payer: COMMERCIAL

## 2022-03-23 ENCOUNTER — ALLIED HEALTH/NURSE VISIT (OUTPATIENT)
Dept: NURSING | Facility: CLINIC | Age: 82
End: 2022-03-23
Payer: COMMERCIAL

## 2022-03-23 DIAGNOSIS — Z71.89 COUNSELING AND COORDINATION OF CARE: Primary | ICD-10-CM

## 2022-03-23 NOTE — PROGRESS NOTES
3/22/2022  Clinic Care Coordination Contact  Community Health Worker Follow Up    Intervention and Education during outreach:   Anamika : Gurinderjessica ID# [id number]    Received call from patient's niece Lev Green. Stated they received the letter from [facility] office that his SSI benefit will closed.  She will bring the letter to the clinic 3-24-22 to CHW to make copy and fax to Quyen Royal,  at [facility].    Wait to hear from Quyen Royal of next process and steps      CHW Follow up: Monthly  CHW Plan: Follow up on goal   CHW Next Follow Up: 4-22-22    Serene Farias  Community Health Worker  [facility]  Clinic Care Coordination  [email or web address]   Office: [phone]  Fax: [phone]    Clinic Care Coordination Contact    Community Health Worker Follow Up    Care Gaps:     Health Maintenance Due   Topic Date Due     DEPRESSION ACTION PLAN  Never done     ZOSTER IMMUNIZATION (1 of 2) Never done     PHQ-9  04/06/2022     FALL RISK ASSESSMENT  04/22/2022       Care Gaps Last addressed on will discuss at next office visit with the doctor on 6-6-22    Goals:   Goals Addressed as of 3/23/2022 at 9:58 AM                    3/22/22    3/7/22       Psychosocial (pt-stated)   80%  80%    Added 2/7/22 by Gary Giang, [facility]      Goal Statement: I want to continue the process of my citizenship for 1-2 months.  Date Goal set: 02/07/22  Barriers: Language  Strengths:   Date to Achieve By: 4/30/2022  Patient expressed understanding of goal: Yes  Action steps to achieve this goal:  1. Sally will drop off the letter from [facility] office on 3-24-22 at the clinic to send to Quyen Royal at [facility].  2. Sally will talk to Quyen Royal for support on next steps      [facility] Legal Services ([facility])-support to apply for Naturalization (N-400)  [address]  [place] [address]  [phone]  Fax: [phone]  Intake Line [phone]    Updated: 3-22-22

## 2022-03-24 ENCOUNTER — PATIENT OUTREACH (OUTPATIENT)
Dept: NURSING | Facility: CLINIC | Age: 82
End: 2022-03-24

## 2022-03-24 ENCOUNTER — PATIENT OUTREACH (OUTPATIENT)
Dept: NURSING | Facility: CLINIC | Age: 82
End: 2022-03-24
Payer: COMMERCIAL

## 2022-03-24 NOTE — PROGRESS NOTES
3/24/2022  Clinic Care Coordination Contact    Community Health Worker Follow Up  Intervention and Education during outreach:    Called and spoke to sander wakefield Lev Green.  Informed to drop off the [facility] letter at the  to make copy due to CHW will not be in the office.  Sally agreed to drop letter with  staff.  CC SW will review letter on Monday.      CHW Follow up: Monthly    CHW Plan: Follow up on goals    CHW Next Follow Up 4-25-22      Goals:   Goals Addressed as of 3/24/2022 at 8:10 AM                    3/22/22    3/7/22       Psychosocial (pt-stated)   80%  80%    Added 2/7/22 by Gary Giang, [facility]      Goal Statement: I want to continue the process of my citizenship for 1-2 months.  Date Goal set: 02/07/22  Barriers: Language  Strengths:   Date to Achieve By: 4/30/2022  Patient expressed understanding of goal: Yes  Action steps to achieve this goal:  1. Sally will drop off the letter from [employer]SA office on 3-24-22 at clinic for CC Team to send to Quyen Royal at [facility].  2. Sally will talk to Quyen Royal for support on next steps      [facility] Legal Services ([facility])-support to apply for Naturalization (N-117) [address]  [place] [address]  [phone]  Fax: [phone]  Intake Line [phone]    Updated: 3-24-22

## 2022-03-29 ENCOUNTER — TELEPHONE (OUTPATIENT)
Dept: FAMILY MEDICINE | Facility: CLINIC | Age: 82
End: 2022-03-29

## 2022-03-29 ENCOUNTER — PATIENT OUTREACH (OUTPATIENT)
Dept: NURSING | Facility: CLINIC | Age: 82
End: 2022-03-29
Payer: COMMERCIAL

## 2022-03-29 NOTE — PROGRESS NOTES
3/24/2022  Clinic Care Coordination Contact    Community Health Worker Follow Up    Intervention and Education during outreach:   Anamika  Juancho ID [id number]    Called and spoke to patient's niece Eh Lev Green.  Apologized to patient's niece regarding today's appt.  Informed CHW will not be at the clinic today and if she can drop off the letter at the clinic at the  staff   Sally stated she will drop off the letters for CC Team to help send the letter to [facility] for Quyen Her.    Serene Farias  Community Health Worker  [facility]  Clinic Care Coordination  [email or web address]   Office: [phone]  Fax: [phone]

## 2022-03-29 NOTE — TELEPHONE ENCOUNTER
Travel questionnaire was asked. Verified that they have no signs of COVID-19 symptoms.    Lev Green, (C2C on file) dropped off medical opinion for Dr. Montes to fill out. Placed the original copies in the 's slot.    When forms are completed, patient would like it:    Fax to [phone] and mail the original to home address.     Ok to leave a detailed message if unable to get a hold of the Patient.    Please re-route task back to the  to shred the copied forms and complete the task. Thanks!

## 2022-03-29 NOTE — PROGRESS NOTES
3/24/2022  Clinic Care Coordination Contact  Community Health Worker Follow Up  Intervention and Education during outreach:     Sally already drop off the letters at the clinic.    Consulted and updated  CC SW   Plan: CC LIOR will review letters  Fax to JAKE Napier Her    CHW Follow up: Monthly  CHW Plan: Follow up on goal  CHW Next Follow Up: 4-25-22    Serene Farias  Community Health Worker  [facility]  Clinic Care Coordination  [email or web address]   Office: [phone]  Fax: [phone]

## 2022-03-29 NOTE — PROGRESS NOTES
Clinic Care Coordination Contact    Follow Up Progress Note      Assessment: [facility] SW contacted [facility] Peter about letter received from Quyen Royal at [facility]. They are needing to designate a family member to sign for Teodoro Gregorio's oath. The letter states to have Tricia Temple, his daughter, be the designee. It doesn't seem like she can be because she is not providing care for Teodoro Gregorio. There is also a lot of confusion on what Teodoro Gregorio's children's name are. There are different names written on the letter vs. What was submitted on his N-400.     SW sent urgent message to  to help address this because it is due April 4th.     Care Gaps:    Health Maintenance Due   Topic Date Due     DEPRESSION ACTION PLAN  Never done     ZOSTER IMMUNIZATION (1 of 2) Never done     PHQ-9  04/06/2022     FALL RISK ASSESSMENT  04/22/2022       Deferred addressing care gaps due to SW follow up to discuss goal.      Goals addressed this encounter:   Goals Addressed                    This Visit's Progress       Psychosocial (pt-stated)         Goal Statement: I want to continue the process of my citizenship for 1-2 months.  Date Goal set: 02/07/22  Barriers: Language  Strengths:   Date to Achieve By: 4/30/2022  Patient expressed understanding of goal: Yes  Action steps to achieve this goal:  1. SW waiting to hear from Quyen Her for next steps             Intervention/Education provided during outreach: See above     Outreach Frequency: monthly        Plan:   SW will follow up tomorrow

## 2022-03-30 ENCOUNTER — PATIENT OUTREACH (OUTPATIENT)
Dept: NURSING | Facility: CLINIC | Age: 82
End: 2022-03-30
Payer: COMMERCIAL

## 2022-03-30 NOTE — PROGRESS NOTES
Clinic Care Coordination Contact  Care Team Conversations    CCC LIOR contacted  Lev Green by phone, she hasn't heard from Quyen Her. LIOR has not received a message back either. LIOR will wait until tomorrow and then try to reach [facility].

## 2022-03-31 ENCOUNTER — PATIENT OUTREACH (OUTPATIENT)
Dept: NURSING | Facility: CLINIC | Age: 82
End: 2022-03-31
Payer: COMMERCIAL

## 2022-03-31 NOTE — PROGRESS NOTES
CCC LIOR left voicemail for Quyen Her to please call  Lev Green.     SW called  Lev Green, she hasn't heard from Quyen her. SW informed her SW left voicemail.

## 2022-04-05 ENCOUNTER — TELEPHONE (OUTPATIENT)
Dept: CARE COORDINATION | Facility: CLINIC | Age: 82
End: 2022-04-05

## 2022-04-05 ENCOUNTER — PATIENT OUTREACH (OUTPATIENT)
Dept: NURSING | Facility: CLINIC | Age: 82
End: 2022-04-05
Payer: COMMERCIAL

## 2022-04-05 NOTE — PROGRESS NOTES
4/5/2022  Clinic Care Coordination Contact  Community Health Worker Follow Up    Intervention and Education during outreach:   Received call from patient's niece Lev Green  Stated she wants to know the status of the county form she dropped of on 3-29-22 for PCP to complete and sent to the county.  PCP needs to complete the form in order to get [facility] benefit.  CHW sent telephone message to PCP Care Team Pool regarding status of [facility] form.  -no updates from Quyen Her-[facility]  Informed niece CSW left VM with Quyen Her.   -the daughter information is wrong name spell wrong.      CC  4-13-22  CHW Follow up: Monthly  CHW Plan: Follow up on goal (s)  CHW Next Follow Up: 5-13-22    Serene Farias  Community Health Worker  [facility]  Clinic Care Coordination  [email or web address]   Office: [phone]  Fax: [phone]    Clinic Care Coordination Contact    Community Health Worker Follow Up    Care Gaps:     Health Maintenance Due   Topic Date Due     DEPRESSION ACTION PLAN  Never done     ZOSTER IMMUNIZATION (1 of 2) Never done     PHQ-9  04/06/2022     FALL RISK ASSESSMENT  04/22/2022       Care Gaps Last addressed on will discuss at next office visit     Goals:   Goals Addressed as of 4/5/2022 at 12:19 PM                    Today    3/22/22       Psychosocial (pt-stated)   80%  80%    Added 2/7/22 by Gary Giang, [facility]      Goal Statement: I want to continue the process of my citizenship for 1-2 months.  Date Goal set: 02/07/22  Barriers: Language  Strengths:   Date to Achieve By: 4/30/2022  Patient expressed understanding of goal: Yes  Action steps to achieve this goal:  1. Niece will follow up with  [facility] LIOR on 4-13-22 at 10am status of citizenship and next steps from Quyen Her.    Updated 4-5-22    [facility] Legal Services ([facility])-support to apply for Naturalization (N-400)  [address]  [place] [address]  [phone]  Fax: [phone]  Intake  Line [phone]

## 2022-04-05 NOTE — TELEPHONE ENCOUNTER
4/5/2022  Received message from patient's niece Lev  Peter wants to know the status of the county form and if PCP complete it and fax to [facility].    Stated they drop off the county form at the clinic on 3-29-22 for PCP to complete.    Please call twyla to update [phone]

## 2022-04-06 NOTE — TELEPHONE ENCOUNTER
I have never filled this form out for an 82-year-old patient.  I do not believe he needs this form.  I put on there that he is 82 years old and I do not think this form pertains to him.

## 2022-04-07 NOTE — TELEPHONE ENCOUNTER
Form completed. Faxed to the # [phone]. Made a copy for scan and sent the original to patient home address. Complete task.

## 2022-04-13 ENCOUNTER — PATIENT OUTREACH (OUTPATIENT)
Dept: NURSING | Facility: CLINIC | Age: 82
End: 2022-04-13
Payer: COMMERCIAL

## 2022-04-13 NOTE — PROGRESS NOTES
Clinic Care Coordination Contact  Care Team Conversations    CCC SW contacted Lev Green, she hasn't heard from Quyen Her on what to do for next steps. LIOR sent another urgent message to Quyen Her asking for direction on next steps.

## 2022-05-04 ENCOUNTER — PATIENT OUTREACH (OUTPATIENT)
Dept: CARE COORDINATION | Facility: CLINIC | Age: 82
End: 2022-05-04
Payer: COMMERCIAL

## 2022-05-04 NOTE — PROGRESS NOTES
"Clinic Care Coordination Contact  Care Team Conversations    SW received message back from Quyen Her on 4/15, Quyen reported they spoke with  Lev Green about the options. They need to establish [employer] as the designated representative. They have until June to do so due to a 60 day extension.     \"I informed her of the option to delegate Teodoro Gregorio's SSI money to [employer] and have family members do personal statements of affidavit of birth for Tricia Day, for secondary evidence of biological relationship.   \"    "

## 2022-05-05 ENCOUNTER — PATIENT OUTREACH (OUTPATIENT)
Dept: NURSING | Facility: CLINIC | Age: 82
End: 2022-05-05
Payer: COMMERCIAL

## 2022-05-05 NOTE — PROGRESS NOTES
5/5/2022  Clinic Care Coordination Contact  Financial Resource Worker Screening    County Benefits  Is patient requesting help applying for [facility] benefits?: Yes (re apply for Cash and support to apply for rental assistance)  Have you recently applied for any county benefits?: Yes  What was applied for? : CASH  Application date:: 4-29-22  How many people in your household?: 1  Do you buy/eat food together?: Yes  What is the monthly gross income for the household (wages, social security, workers comp, and pension)? : 0    Insurance:  [place] No  Is this an insurance renewal?: No  Is this a new insurance application request?: No    Any other information for the FRW?: SSI benefit stop because he is not [place] citizen, has SNAP $81  Call the twyla [facility] [phone]    Care Coordination team will tell patient:   Thank you for answering all the questions, based on screening questions, our Financial Resource Worker will reach out to you with additional questions and next steps.

## 2022-05-05 NOTE — PROGRESS NOTES
5/5/2022  Clinic Care Coordination Contact  Community Health Worker Follow Up    Intervention and Education during outreach:     [facility] Anamika :  Mayur    Received call from patient's niece Lev Green.  -he has citizenship interview on 6-2-22. The  thinking to reschedule the interview until he has a legal guardianship and that will take a long time have to go to court. The  said she can't help with the legal guardianship.  Stated patient's daughter does not want to be involve with his case because she has her own case.  Niece will be the rep and how to get the niece to be the rep.  Scheduled in person with CC SW 5-9-22 at 1pm to see what is the best route to address the issues.    -they apply for cash benefit on 4-29-22 because his SSI benefit discontinued due to not being a citizen.  He needs help to pay rent.  Stated they received a letter from the [facility] that she is denied for cash because they did not get the medical opinion form and to re apply again.  She received SNAP $81  CHW sent referral to [facility] for support to reapply for cash  CHW Follow up: Monthly  CHW Plan: Follow up on goal  CHW Next Follow Up: 6-14-22    Serene Farias  Community Health Worker  [facility]  Clinic Care Coordination  [email or web address]   Office: [phone]  Fax: [phone]    Clinic Care Coordination Contact    Community Health Worker Follow Up    Care Gaps:     Health Maintenance Due   Topic Date Due     DEPRESSION ACTION PLAN  Never done     ZOSTER IMMUNIZATION (1 of 2) Never done     DTAP/TDAP/TD IMMUNIZATION (1 - Tdap) 04/07/2016     PHQ-9  04/06/2022     COVID-19 Vaccine (4 - Booster for Moderna series) 04/15/2022     FALL RISK ASSESSMENT  04/22/2022       Care Gap Goal set: Yes and Scheduled 6-6-22      Goals:    Goals Addressed as of 5/5/2022 at 4:05 PM                    Today    4/5/22       Other (pt-stated)         Added 5/5/22 by Serene Farias      Goal  Statement: I want support to re apply for county benefit -cash and rental assistance program as soon as possible.   Date Goal set: 5/5/2022  Barriers: language  Strengths: support from CCC team, FRW  Date to Achieve By:7-2022  Patient expressed understanding of goal: yes  Action steps to achieve this goal:  1. Sally and I will wait to get a call from [facility] for support to re apply for cash and rental assistance.           Other (pt-stated)         Added 5/5/22 by Serene Farias      Goal Statement: I will attend appt on 6-6-22 to review and discuss care gaps with my doctor.  Date Goal set: 5-5-2022  Barriers: language  Strengths: support from CCC team  Date to Achieve By: 7-2022  Patient expressed understanding of goal: yes  Action steps to achieve this goal:  1. I will attend appt on 6-6-22 to review and discuss care gaps with the doctor.  2. I will contact my Care Management or clinic team if I have barriers to attending my annual wellness visit.              Psychosocial (pt-stated)   80%  80%    Added 2/7/22 [facility], [facility]      Goal Statement: I want to continue the process of my citizenship for 3-4 months.  Date Goal set: 02/07/22 Updated 5-5-22  Barriers: Language  Strengths:   Date to Achieve By: 4/30/2022  Patient expressed understanding of goal: Yes  Action steps to achieve this goal:  1. Sally will follow up with  [facility] SW on 5-9-22 at 1pm for support to get Power  to rep at the citizenship interview on 6-2-22..    Updated 5-5-22    [facility] Legal Services ([facility]support to apply for Naturalization (N-400)  [address]  [place] [address]  [phone]  Fax: [phone]  Intake Line [phone]

## 2022-05-09 ENCOUNTER — PATIENT OUTREACH (OUTPATIENT)
Dept: CARE COORDINATION | Facility: CLINIC | Age: 82
End: 2022-05-09

## 2022-05-09 ENCOUNTER — ALLIED HEALTH/NURSE VISIT (OUTPATIENT)
Dept: NURSING | Facility: CLINIC | Age: 82
End: 2022-05-09
Payer: COMMERCIAL

## 2022-05-09 DIAGNOSIS — Z71.89 COUNSELING AND COORDINATION OF CARE: Primary | ICD-10-CM

## 2022-05-09 PROCEDURE — 99207 PR NO CHARGE NURSE ONLY: CPT

## 2022-05-09 NOTE — PROGRESS NOTES
Clinic Care Coordination Contact  Program: Rental assistance   County:[facility] Case #:  [facility] Worker:   Juniekaitlynn #:   Subscriber #:   Renewal:  Date Applied:     FRW Outreach:   5/10/2022: FRW received a call form Eh  Peter and we completed a on line application for rental assistance  stated that they did not need to apply for Cash assistance   5/10/2022:  FRW called patient and left a  with call back information. FRW will make outreach next week  SNAP/CASH Application Screenin. Have you had county benefits before?  2. How many people in the household, do you eat/buy food together?  3. What is your monthly income (include all tax members)?   4. Do you have a bank account?   5. Do you have any utility bills (electricity, rent, mortgage, phone, insurance, medical bills, etc.)?   6. Do you have social security cards and/or green cards?       Health Insurance:      Referral/Screening:  FRW Screening    Row Name [id number]         County Benefits     Is patient requesting help applying for county benefits? Yes  re apply for Cash and support to apply for rental assistance         Have you recently applied for any county benefits? Yes         What was applied for?  CASH         Application date: 22         How many people in your household? 1         Do you buy/eat food together? Yes         What is the monthly gross income for the household (wages, social security, workers comp, and pension)?  0                   Insurance:     [place]? No         Is this an insurance renewal? No         Is this a new insurance application request? No                   OTHER     Is this a lita care application? No         Any other information for the FRW? SSI benefit stop because he is not [place] citizen, has SNAP $81               Goals Addressed:

## 2022-05-09 NOTE — PROGRESS NOTES
Clinic Care Coordination Contact    Follow Up Progress Note      Assessment: [facility] LIOR met with Teodoro Gregorio and [facility] Peter in the clinic with a telephone .     LIOR called Quyen Her,  at [employer], to check what options Teodoro Gregorio has. His daughter is not able to go through the process to become his designated representative for his USCIS interview.  Lev Green would do it, but she can't because she doesn't meet the relative requirements. The only next step is for her to become his guardian.     Quyen Her reported she will be contacting [facility] Peter this week, she was able to find another  to work with to help  Lev Green become guardian. She will reschedule the USCIS interview, so they should not attend the interview on 6/2/2022. A physicans statement needs to be completed, ideally by Dr. Ray who completed the N-648 waiver, if he is unable to then PCP can. SW will send the form to Dr. Ray.     Care Gaps:    Health Maintenance Due   Topic Date Due     DEPRESSION ACTION PLAN  Never done     ZOSTER IMMUNIZATION (1 of 2) Never done     DTAP/TDAP/TD IMMUNIZATION (1 - Tdap) 04/07/2016     PHQ-9  04/06/2022     COVID-19 Vaccine (4 - Booster for Moderna series) 04/15/2022     FALL RISK ASSESSMENT  04/22/2022           Goals addressed this encounter:    Goals Addressed                    This Visit's Progress       Psychosocial (pt-stated)         Goal Statement: I want to continue the process of my citizenship for 3-4 months.  Date Goal set: 02/07/22 Updated 5-5-22  Barriers: Language  Strengths:   Date to Achieve By: 6/30/2022  Patient expressed understanding of goal: Yes  Action steps to achieve this goal:  1. [facility] Peter will wait to hear from Quyen Her about next steps for pursuing guardianship of Teodoro Gregorio  2. [facility] SW will forward Physicians Statement to Dr. Ray, if Dr. Ray is unable to complete this, consult with PCP  3. Once guardianship is established, Teodoro Gregorio can attend USCIS interview                 Intervention/Education provided during outreach: See above     Outreach Frequency: monthly      Plan:   SW will send Physicians Statement form to Dr. Ray for review. Standard outreach

## 2022-06-06 ENCOUNTER — OFFICE VISIT (OUTPATIENT)
Dept: FAMILY MEDICINE | Facility: CLINIC | Age: 82
End: 2022-06-06
Payer: COMMERCIAL

## 2022-06-06 VITALS
BODY MASS INDEX: 22.75 KG/M2 | TEMPERATURE: 97.9 F | DIASTOLIC BLOOD PRESSURE: 62 MMHG | WEIGHT: 118 LBS | OXYGEN SATURATION: 99 % | HEART RATE: 64 BPM | SYSTOLIC BLOOD PRESSURE: 138 MMHG

## 2022-06-06 DIAGNOSIS — H04.123 DRY EYES: ICD-10-CM

## 2022-06-06 DIAGNOSIS — K14.8 DRY TONGUE: Primary | ICD-10-CM

## 2022-06-06 DIAGNOSIS — K03.6 BETEL DEPOSIT ON TEETH: ICD-10-CM

## 2022-06-06 DIAGNOSIS — D64.9 ANEMIA, UNSPECIFIED TYPE: ICD-10-CM

## 2022-06-06 DIAGNOSIS — F51.01 PRIMARY INSOMNIA: ICD-10-CM

## 2022-06-06 LAB
ALBUMIN UR-MCNC: NEGATIVE MG/DL
ANION GAP SERPL CALCULATED.3IONS-SCNC: 10 MMOL/L (ref 5–18)
APPEARANCE UR: CLEAR
BACTERIA #/AREA URNS HPF: ABNORMAL /HPF
BILIRUB UR QL STRIP: NEGATIVE
BUN SERPL-MCNC: 18 MG/DL (ref 8–28)
CALCIUM SERPL-MCNC: 9.9 MG/DL (ref 8.5–10.5)
CHLORIDE BLD-SCNC: 105 MMOL/L (ref 98–107)
CO2 SERPL-SCNC: 26 MMOL/L (ref 22–31)
COLOR UR AUTO: YELLOW
CREAT SERPL-MCNC: 1.52 MG/DL (ref 0.7–1.3)
ERYTHROCYTE [DISTWIDTH] IN BLOOD BY AUTOMATED COUNT: 14.7 % (ref 10–15)
GFR SERPL CREATININE-BSD FRML MDRD: 45 ML/MIN/1.73M2
GLUCOSE BLD-MCNC: 83 MG/DL (ref 70–125)
GLUCOSE UR STRIP-MCNC: NEGATIVE MG/DL
HCT VFR BLD AUTO: 39.6 % (ref 40–53)
HGB BLD-MCNC: 12.8 G/DL (ref 13.3–17.7)
HGB UR QL STRIP: NEGATIVE
KETONES UR STRIP-MCNC: ABNORMAL MG/DL
LEUKOCYTE ESTERASE UR QL STRIP: NEGATIVE
MCH RBC QN AUTO: 27.1 PG (ref 26.5–33)
MCHC RBC AUTO-ENTMCNC: 32.3 G/DL (ref 31.5–36.5)
MCV RBC AUTO: 84 FL (ref 78–100)
NITRATE UR QL: NEGATIVE
PH UR STRIP: 6 [PH] (ref 5–8)
PLATELET # BLD AUTO: 214 10E3/UL (ref 150–450)
POTASSIUM BLD-SCNC: 4 MMOL/L (ref 3.5–5)
RBC # BLD AUTO: 4.72 10E6/UL (ref 4.4–5.9)
RBC #/AREA URNS AUTO: ABNORMAL /HPF
SODIUM SERPL-SCNC: 141 MMOL/L (ref 136–145)
SP GR UR STRIP: 1.02 (ref 1–1.03)
SQUAMOUS #/AREA URNS AUTO: ABNORMAL /LPF
UROBILINOGEN UR STRIP-ACNC: 0.2 E.U./DL
WBC # BLD AUTO: 7.3 10E3/UL (ref 4–11)
WBC #/AREA URNS AUTO: ABNORMAL /HPF

## 2022-06-06 PROCEDURE — 80048 BASIC METABOLIC PNL TOTAL CA: CPT | Performed by: FAMILY MEDICINE

## 2022-06-06 PROCEDURE — 85027 COMPLETE CBC AUTOMATED: CPT | Performed by: FAMILY MEDICINE

## 2022-06-06 PROCEDURE — 0064A COVID-19,PF,MODERNA (18+ YRS BOOSTER .25ML): CPT | Performed by: FAMILY MEDICINE

## 2022-06-06 PROCEDURE — 91306 COVID-19,PF,MODERNA (18+ YRS BOOSTER .25ML): CPT | Performed by: FAMILY MEDICINE

## 2022-06-06 PROCEDURE — 99214 OFFICE O/P EST MOD 30 MIN: CPT | Mod: 25 | Performed by: FAMILY MEDICINE

## 2022-06-06 PROCEDURE — 36415 COLL VENOUS BLD VENIPUNCTURE: CPT | Performed by: FAMILY MEDICINE

## 2022-06-06 PROCEDURE — 81001 URINALYSIS AUTO W/SCOPE: CPT | Performed by: FAMILY MEDICINE

## 2022-06-06 RX ORDER — RAMELTEON 8 MG/1
8 TABLET ORAL AT BEDTIME
Qty: 30 TABLET | Refills: 5 | Status: SHIPPED | OUTPATIENT
Start: 2022-06-06 | End: 2022-10-04

## 2022-06-06 RX ORDER — OLOPATADINE HYDROCHLORIDE 1 MG/ML
1 SOLUTION/ DROPS OPHTHALMIC 2 TIMES DAILY
Qty: 10 ML | Refills: 1 | Status: SHIPPED | OUTPATIENT
Start: 2022-06-06 | End: 2023-01-05

## 2022-06-06 ASSESSMENT — PATIENT HEALTH QUESTIONNAIRE - PHQ9: SUM OF ALL RESPONSES TO PHQ QUESTIONS 1-9: 10

## 2022-06-06 NOTE — PROGRESS NOTES
Teodoro was seen today for follow up and refill request.    Diagnoses and all orders for this visit:    Dry tongue: niece says just the tongue not the mouth. Check kidneys below. Denies polyuria although states drinks a lot of water. Polydipsia? Mucous membranes appeared moist. He is actively chewing betel nut on exam which obstructs view of tongue and part of mouth. I have asked him to stop use as this could contribute. Return for repeat exam when not actively chewing betel nut that day. Consider Sjogren's syndrome work up?- does have dry eyes as well. Unclear if could be a glossitis? Consider work up for polydipsia. Was difficult to clarify the issue/concern.   -     Basic metabolic panel  (Ca, Cl, CO2, Creat, Gluc, K, Na, BUN); Future  -     UA with Microscopic reflex to Culture - Clinic Collect    Dry eyes  Comments:  Stable refill provided  Orders:  -     olopatadine (PATANOL) 0.1 % ophthalmic solution; Place 1 drop into both eyes 2 times daily    Primary insomnia  Comments:  improved. continue ramelteon    Orders:  -     ramelteon (ROZEREM) 8 MG tablet; Take 1 tablet (8 mg) by mouth At Bedtime    Anemia, unspecified type: reported fatigue ongoing. Lots of labs done in March. Vit D improved. CBC improved. Thyroid and b12 checked last fall. Unclear cause. He is 82. Will recheck CBC today for assessment.   -     CBC with platelets; Future  -     CBC with platelets    Other orders  -     COVID-19,PF,MODERNA (18+ YRS BOOSTER .25ML)          Subjective   Teodoro is a 82 year old who presents for the following health issues  accompanied by his neice.    HPI   Sometimes tongue  gets dry even though he drinks a lot of water.   started a couple weeks ago.   Feeling tired/fatigued quite often   Using eye drops.   No difficulties swallowing.   Any fever? But no fever  Not the   Exam: actively chewing betel nut.       Review of Systems   Constitutional, HEENT, cardiovascular, pulmonary, gi and gu systems are negative, except  as otherwise noted.      Objective    /62 (BP Location: Left arm, Patient Position: Sitting, Cuff Size: Adult Small)   Pulse 64   Temp 97.9  F (36.6  C) (Temporal)   Wt 53.5 kg (118 lb)   SpO2 99%   BMI 22.75 kg/m    Body mass index is 22.75 kg/m .  Physical Exam   GENERAL: healthy, alert and no distress  EYES: Eyes grossly normal to inspection, PERRL and conjunctivae and sclerae normal  HENT: nose and mouth without ulcers or lesions, oropharynx clear, oral mucous membranes moist and actively chewing betel nut and obstructing part of exam.  NECK: no adenopathy, no asymmetry, masses, or scars and thyroid normal to palpation  RESP: lungs clear to auscultation - no rales, rhonchi or wheezes  CV: regular rate and rhythm, normal S1 S2, no S3 or S4, no murmur, click or rub, no peripheral edema and peripheral pulses strong  MS: no gross musculoskeletal defects noted, no edema

## 2022-06-06 NOTE — COMMUNITY RESOURCES LIST (ENGLISH)
06/06/2022   [facility] Outpatient Clin[facility]  Izabelau Beto  For questions about this resource list or additional care needs, please contact your primary care clinic or care manager.  Phone: [phone]   Email: N/A   Address: [address] [place] [address]   Hours: N/A        Hotlines and Helplines       Hotline - Crisis help  1  [facility] Adult Mental Health Urgent Care Distance: 1.47 miles      COVID-19 Status: Phone/Virtual   [address] [place] [address]  Language: English, Hmong, Cook Islander  Hours: Mon - Sun Open 24 Hours   Phone: (150) 252-4183 Website: https://www.[facility]./[facility]/health-medical/clinics-services/mental-behavorial-health/adult-mental-health-chemical-health/urgent-care-adult-mental-health     2  [place] Department of Human Services - Crisis Text Line - Crisis Text Line Distance: 1.73 miles      COVID-19 Status: Phone/Virtual   [address] [place] [address]  Language: English  Hours: Mon - Sun Open 24 Hours   Website: https://[place].gov/dhs/partners-and-providers/policies-procedures/adult-mental-health/crisis-text-line/          Mental Health       Individual counseling  3  [facility] Distance: 1.37 miles      COVID-19 Status: Phone/Virtual   [address] [place] [address]  Language: English, Hmong, Yakut  Hours: Mon - Fri 9:00 AM - 6:00 PM  Fees: Insurance, Self Pay   Phone: (786) 324-2905 Email: info@[employer]. Website: https://www.[employer]./locations/[facility]/     4  [employer] [facility] Distance: 1.44 miles      COVID-19 Status: Phone/Virtual   [address] [place] [address]  Language: English, Hmong, Brazilian  Hours: Mon - Fri 9:00 AM - 5:00 PM  Fees: Insurance, Self Pay   Phone: (897) 157-8583 Email: info@[employer]art [employer]ionic.org Website: http://www.metrosocialservices.org/     Mental health crisis care  5  [place]  [facility] Adult Mental Health Urgent Care - Adult Program Distance: 1.47 miles      COVID-19 Status: Regular Operations   [address] [facility] [place] [address]  Language: English, Hmong, Iraqi  Hours: Mon - Fri 8:00 AM - 5:30 PM  Fees: Insurance, Self Pay, Sliding Fee   Phone: (236) 582-7016 Website: https://www.[facility]./[facility]/health-medical/clinics-services/mental-behavorial-health/adult-mental-health-chemical-health/urgent-care-adult-mental-health     6  [facility] Distance: 5.59 miles      COVID-19 Status: Regular Operations, COVID-19 Status: Phone/Virtual   [address] [facility] [address] [place] [address]  Language: English, Central African  Hours: Mon - Fri 9:00 AM - 5:00 PM  Fees: Insurance, Self Pay   Phone: (897) 778-5803 Website: http://ReserveOut/index.php     Mental health support group  7  [facility] Distance: 2.01 miles      COVID-19 Status: Phone/Virtual   [address] [facility] [address] [place] [address]  Language: English  Hours: Mon - Fri 8:00 AM - 4:30 PM  Fees: Free   Phone: (447) 838-9360 Website: https://www.[place].gov/find-locations/facility/vc_0416V     8  [employer] [facility] Distance: 3.33 miles      COVID-19 Status: Regular Operations, COVID-19 Status: Phone/Virtual   [address] [place] [address]  Language: English  Hours: Mon - Thu 12:00 PM - 5:00 PM  Fees: Free   Phone: (444) 422-2958 Email: info@emotionsanonyEbrun.com.org Website: http://Hopscot.ch.org/          Important Numbers & Websites       Emergency Services   911  City Services   311  Poison Control   (404) 707-1407  Suicide Prevention Lifeline   (164) 640-4217 (TALK)  Child Abuse Hotline   (713) 579-8737 (4-A-Child)  Sexual Assault Hotline   (455) 280-4271 (HOPE)  National Runaway Safeline   (640) 722-1257 (RUNAWAY)  All-Options Talkline   (794) 665-9588  Substance Abuse Referral   (289) 769-7978 (HELP)

## 2022-06-08 ENCOUNTER — PATIENT OUTREACH (OUTPATIENT)
Dept: CARE COORDINATION | Facility: CLINIC | Age: 82
End: 2022-06-08
Payer: COMMERCIAL

## 2022-06-08 PROBLEM — K03.6 BETEL DEPOSIT ON TEETH: Status: ACTIVE | Noted: 2022-06-08

## 2022-06-08 NOTE — PROGRESS NOTES
Clinic Care Coordination Contact  Program: Rental assistance   County:[facility] Case #:  [facility] Worker:   Juniekaitlynn #:   Subscriber #:   Renewal:  Date Applied:     FRW Outreach:   2022  FRW called patient and left a vm with call back information. FRW will make outreach next week  5/10/2022: FRW received a call form Eh  Peter and we completed a on line application for rental assistance Eh stated that they did not need to apply for Cash assistance   5/10/2022:  FRW called patient and left a vm with call back information. FRW will make outreach next week  SNAP/CASH Application Screenin. Have you had [facility] benefits before?  2. How many people in the household, do you eat/buy food together?  3. What is your monthly income (include all tax members)?   4. Do you have a bank account?   5. Do you have any utility bills (electricity, rent, mortgage, phone, insurance, medical bills, etc.)?   6. Do you have social security cards and/or green cards?       Health Insurance:      Referral/Screening:  FRW Screening    Row Name 22 [id number]         County Benefits     Is patient requesting help applying for [facility] benefits? Yes  re apply for Cash and support to apply for rental assistance         Have you recently applied for any [facility] benefits? Yes         What was applied for?  CASH         Application date: 22         How many people in your household? 1         Do you buy/eat food together? Yes         What is the monthly gross income for the household (wages, social security, workers comp, and pension)?  0                   Insurance:     [place]? No         Is this an insurance renewal? No         Is this a new insurance application request? No                   OTHER     Is this a lita care application? No         Any other information for the FRW? SSI benefit stop because he is not US citizen, has SNAP $81               Goals Addressed:

## 2022-06-14 ENCOUNTER — PATIENT OUTREACH (OUTPATIENT)
Dept: NURSING | Facility: CLINIC | Age: 82
End: 2022-06-14
Payer: COMMERCIAL

## 2022-06-14 NOTE — PROGRESS NOTES
6/14/2022  Clinic Care Coordination Contact  Community Health Worker Follow Up    Intervention and Education during outreach:   Received call from patient's niece Lev Green that missed a call from someone today phone number is [phone] she does not know who call.  Review chart and informed that FRW Grace called her today to support re apply for SNAP/[place].    Nisalas updated and reported that patient attended the appt with the doctor.  She reported the  Quyen Her cancelled the June interview with immigration office until she has the legal guardian situated.  Quyen Her is helping with the guardianship.  Wait for immigration office to re schedule interview.  Niece and patient has support from  Quyen Her at [facility] for support,.    Connected with Grace, FRW and informed nisalas call back.   Per FRW will call her back today at 1pm  Informed nisalas that Grace, FRW will call her today at 1pm to be by the phone to answer.  Sally confirmed information.    CHW Follow up: Monthly  CHW Plan: Follow up on goal   CHW Next Follow Up: 7-28-22    Serene Farias  Community Health Worker  [facility]  Clinic Care Coordination  [email or web address]   Office: [phone]  Fax: [phone]  Clinic Care Coordination Contact    Community Health Worker Follow Up    Care Gaps:     Health Maintenance Due   Topic Date Due     DEPRESSION ACTION PLAN  Never done     ZOSTER IMMUNIZATION (1 of 2) Never done     FALL RISK ASSESSMENT  04/22/2022       Care Gap Goal set: Yes and Scheduled 8-29-22 with PCP      Goals:    Goals Addressed as of 6/14/2022 at 4:21 PM                    Today       Other (pt-stated)   50%    Added 5/5/22 by Serene Farias      Goal Statement: I want support to re apply for [facility] benefit -cash and rental assistance program as soon as possible.   Date Goal set: 5/5/2022  Barriers: language  Strengths: support from CCC team, FRW  Date to Achieve  By:7-2022  Patient expressed understanding of goal: yes  Action steps to achieve this goal:  1. Niece will wait to get a call from FRW today at 1pm for support to re apply for cash and rental assistance.    Updated 6-14-22

## 2022-06-15 ENCOUNTER — PATIENT OUTREACH (OUTPATIENT)
Dept: CARE COORDINATION | Facility: CLINIC | Age: 82
End: 2022-06-15
Payer: COMMERCIAL

## 2022-06-15 NOTE — PROGRESS NOTES
Clinic Care Coordination Contact  Program: Rental assistance   County:[facility] Case #:  [employer] Worker:   [place] #:   Subscriber #:   Renewal:  Date Applied:     FRW Outreach:   6/15/2022:  FRW called patient and left a vm with call back information. FRW will make outreach next week  2022  FRW called patient and left a vm with call back information. FRW will make outreach next week  5/10/2022: FRW received a call form [facility] and we completed a on line application for rental assistance  stated that they did not need to apply for Cash assistance   5/10/2022:  FRW called patient and left a vm with call back information. FRW will make outreach next week  SNAP/CASH Application Screenin. Have you had [facility] benefits before?  2. How many people in the household, do you eat/buy food together?  3. What is your monthly income (include all tax members)?   4. Do you have a bank account?   5. Do you have any utility bills (electricity, rent, mortgage, phone, insurance, medical bills, etc.)?   6. Do you have social security cards and/or green cards?       Health Insurance:      Referral/Screening:  FRW Screening    Row Name 22 [id number]         [employer] Benefits     Is patient requesting help applying for [facility] benefits? Yes  re apply for Cash and support to apply for rental assistance         Have you recently applied for any [facility] benefits? Yes         What was applied for?  CASH         Application date: 22         How many people in your household? 1         Do you buy/eat food together? Yes         What is the monthly gross income for the household (wages, social security, workers comp, and pension)?  0                   Insurance:     [place]? No         Is this an insurance renewal? No         Is this a new insurance application request? No                   OTHER     Is this a lita care application? No         Any other information for the FRW? SSI benefit stop  because he is not [place] citizen, has SNAP $81               Goals Addressed:

## 2022-06-17 ENCOUNTER — TELEPHONE (OUTPATIENT)
Dept: FAMILY MEDICINE | Facility: CLINIC | Age: 82
End: 2022-06-17
Payer: COMMERCIAL

## 2022-06-17 NOTE — TELEPHONE ENCOUNTER
Reason for Call:  Other call back    Detailed comments: wife called back.. Message below was relayed.  Wife states pt is hydrated and is taking his MVI.  Wife understands message.  Thanks    Phone Number Patient can be reached at: Home number on file [phone] (home)    Best Time: anytime    Can we leave a detailed message on this number? YES    Call taken on 6/17/2022 at 3:11 PM by Faye Pichardo CMA. TC

## 2022-06-17 NOTE — TELEPHONE ENCOUNTER
Called and lvm #1   Okay to relay message              ----- Message from Merary Montes, DO sent at 6/11/2022  5:48 PM CDT -----  His kidney function is a little bit worse than previous. Is he staying well hydrated? Is he still taking him multivitamin? His hemoglobin came down a little bit form last check to 12.8.

## 2022-06-20 ENCOUNTER — PATIENT OUTREACH (OUTPATIENT)
Dept: CARE COORDINATION | Facility: CLINIC | Age: 82
End: 2022-06-20
Payer: COMMERCIAL

## 2022-06-20 NOTE — LETTER
[facility]  Patient Centered Plan of Care  About Me:        Patient Name:  Teodoro Gregorio    YOB: 1940  Age:         82 year old   Fernando MRN:    [id number] Telephone Information:  Home Phone [phone]   Mobile [phone]       Address:  [address]  [place] [address] Email address:  No e-mail address on record      Emergency Contact(s)    Name Relationship Lgl Grd Work Phone Home Phone Mobile Phone   1. SUKHJINDER,  EH Other No   [phone]   2. EDWINA TAMEZ Daughter No   [phone]   3. IAN MARVIN Friend No   [phone]           Primary language:  Anamika     needed? Yes   [place] [facility]:  [phone] op. 1  Other communication barriers:Language barrier; [place] (Hard of hearing)    Preferred Method of Communication:     Current living arrangement: I live in a private home with family    Mobility Status/ Medical Equipment: Independent w/Device        Health Maintenance  Health Maintenance Reviewed:  The following items are due/overdue:  Zoster immunization  Fall Risk Assessment      My Access Plan  Medical Emergency 911   Primary Clinic Line [facility] [phone]   24 Hour Appointment Line [phone] or  [phone]) (toll-free)   24 Hour Nurse Line [phone] (toll-free)   Preferred Urgent Care [facility], [phone]     [facility]  [phone]     Preferred Pharmacy [facility] - [address]     Behavioral Health Crisis Line The National Suicide Prevention Lifeline at 1-954.815.4140 or 911             My Care Team Members  Patient Care Team       Relationship Specialty Notifications Start End    Merary Montes DO PCP - General Family Practice  8/23/19     Phone: [phone] Fax: [phone]         [address] [place] [address]    Merary Montes DO Assigned PCP   10/17/21     Phone: [phone] Fax: [phone]          [address] [place] [address]    Serene Farias Community Health Worker Primary Care - CC Admissions 2/7/22     Phone: [phone] Fax: [phone]         [address] [place] [address]    Grace Asher MA Financial Resource Worker   5/6/22     Cecelia Dunbar MA Community Health Worker Primary Care - CC  5/17/22 6/20/22    Nikki Lima LSW Lead Care Coordinator Primary Care - CC Admissions 6/20/22             My Care Plans    Goals and (Comments)   Goals        General     Other (pt-stated)      Notes - Note edited  6/14/2022  4:14 PM by Serene Farias     Goal Statement: I want support to re apply for county benefit -cash and rental assistance program as soon as possible.   Date Goal set: 5/5/2022  Barriers: language  Strengths: support from CCC team, FRW  Date to Achieve By:7-2022  Patient expressed understanding of goal: yes  Action steps to achieve this goal:  1. Niece will wait to get a call from FRW today at 1pm for support to re apply for cash and rental assistance.    Updated 6-14-22                               Advance Care Plans/Directives Type:   No data recorded    My Medical and Care Information  Problem List   Patient Active Problem List   Diagnosis     Insomnia     Multiple joint pain     Hearing loss     Soft tissue mass     SNHL (sensorineural hearing loss)     Incontinence     Chronic bilateral low back pain without sciatica     Memory difficulty     Mild single current episode of major depressive disorder (H)     Poor dentition     Betel deposit on teeth      Current Medications and Allergies:  See printed Medication Report.    Care Coordination Start Date: 2/7/2022   Frequency of Care Coordination: monthly     Form Last Updated: 06/20/2022

## 2022-06-20 NOTE — PROGRESS NOTES
Care Coordination Clinician Chart Review     Situation: Patient chart reviewed by SW/care coordinator.?     Background: Initial reassessment completed on 2-7-22 for continued  enrollment to Care Coordination.?? Patient centered goals were developed with participation from patient.? Lead CC handed patient off to CHW for continued outreach every 30 days.  FRW currently following for achievement of the goal below. ??     Assessment: Per chart review, patient outreach completed by CC CHW on 6-14-22..? Patient is actively working to accomplish goal(s).? Patient's goal(s) remain(s) appropriate at this time.? Patient is due for updated Plan of Care.? Annual assessment will be due 2-7-23.      Goals        Other (pt-stated)       Goal Statement: I want support to re apply for [facility] benefit -cash and rental assistance program as soon as possible.   Date Goal set: 5/5/2022  Barriers: language  Strengths: support from CCC team, FRW  Date to Achieve By:7-2022  Patient expressed understanding of goal: yes  Action steps to achieve this goal:  1. Sally will wait to get a call from FRW today at 1pm for support to re apply for cash and rental assistance.    Updated 6-14-22          ??     Plan/Recommendations: The patient will continue working with Care Coordination to achieve above goal(s).? CHW will involve Lead CC as needed or if patient is ready to move to maintenance.? Lead CC will continue to monitor CHW s monthly outreaches and progress to goal(s) every 6 weeks.?     Plan of Care updated and sent to patient: Yes    Ollie Bolden, LIOR/CC  [facility]

## 2022-06-21 ENCOUNTER — PATIENT OUTREACH (OUTPATIENT)
Dept: CARE COORDINATION | Facility: CLINIC | Age: 82
End: 2022-06-21
Payer: COMMERCIAL

## 2022-06-21 NOTE — PROGRESS NOTES
6/21/2022  Clinic Care Coordination Contact  Community Health Worker Follow Up    Intervention and Education during outreach:   Anamika : Dominic Alegria Peter   Received call from patient's niece Eh Lev Green stated she needs help to verify if he has [employer] insurance or still with Blue Plus.    Check in [place] patient [place] through  Blue Plus.  Informed niece patient still have [place] through Blue Plus    She would like to combine her PCA and homemaking services and she does not know who to talk to about it.  Suggested to talk to Elderly Waiver  regarding PCA and Homemaking services.  She does not know the .  Patient reported:  -Aapproved for SNAP has EBT card. but not sure about the rental assistance.    CHW sent message to AIMEE Edwards patient approved for SNAP not sure about rental assistance.    Conference call to [place] [employer] [phone] to find out who is the Elderly waiver  and left VM to call CHW back.    CHW Follow up: Monthly  CHW Plan: Follow up on goal  CHW Next Follow Up: 7-28-22    Serene Farias  Community Health Worker  [facility]  Clinic Care Coordination  [email or web address]   Office: [phone]  Fax: [phone]    Clinic Care Coordination Contact    Community Health Worker Follow Up    Care Gaps:     Health Maintenance Due   Topic Date Due     DEPRESSION ACTION PLAN  Never done     ZOSTER IMMUNIZATION (1 of 2) Never done     FALL RISK ASSESSMENT  04/22/2022       Care Gap Goal set: Yes    Goals:    Goals Addressed as of 6/21/2022 at 4:28 PM        Other (pt-stated)     Added 6/21/22 by Serene Farias      Goal Statement: I want support to clarify information about combining homemaking and PCA services within 2 months.   Date Goal set: 6/21/2022  Barriers: language  Strengths: support from CCC team  Date to Achieve By: 8-2022  Patient expressed understanding of goal: yes  Action steps to  achieve this goal:  1. Sally Ohara Peter wait to hear from Elderly Waiver  [phone] if she can combine PCA and Homemaking services.

## 2022-06-21 NOTE — PROGRESS NOTES
6/21/2022  [facility]: [place] Health Care Programs Eligibility Response (047)    SUBSCRIBER INFORMATION  Date of Service Subscriber ID Subscriber Name Birthdate Age Gender  06/21/2022 [id number] ODALYS CAMPBELL 1940 82 MALE     Address  [address] , , [place]  [address]     PROVIDER INFORMATION  Provider ID Submitter Transaction ID Provider Name Taxonomy Code Qualifier Taxonomy Code  [id number] xx [facility]  This subscriber has eligibility for [place]: Medical Assistance.  Elig Type EX: Over age 65  Eligibility Begin Date: 04/01/2018  Eligibility End Date: --/--/----  This subscriber is eligible for the following service types: Medical Care ,  Chiropractic ,  Dental Care ,  Hospital ,  Hospital - Inpatient ,  Hospital - Outpatient ,  Emergency Services ,  Pharmacy ,  Professional (Physician) Visit - Office ,  Vision (Optometry) ,  Mental Health ,  Urgent Care  Prepaid Health Plan  This subscriber receives MA35 - [place] Care Plus (MSC+) delivered through [employer]. The phone numbers is [phone] ().  The health plan is responsible for paying for Nursing Facility Services.  Other Eligibility Information  No Special Transportation.  No Hospice.  Refer to Health Care Programs and Services Overview of the [facility] Provider Manual for a list of covered services.  Waivers  This subscriber is eligible for the Elderly Waiver.  Elderly Waiver services are the responsibility of the Health Plan.  Subscriber Responsibility Information  An office visit copay of 3 dollars may exist for this subscriber.  A copay of 3.50 dollars for Non-Emergency ER visits may exist for this subscriber.  Restricted Recipient Program  None     Medicare  None

## 2022-06-22 ENCOUNTER — PATIENT OUTREACH (OUTPATIENT)
Dept: CARE COORDINATION | Facility: CLINIC | Age: 82
End: 2022-06-22

## 2022-06-22 NOTE — PROGRESS NOTES
Clinic Care Coordination Contact  Program: Rental assistance   County:[facility] Case #:  [facility] Worker:   Junieure #:   Subscriber #:   Renewal:  Date Applied:     FRW Outreach:   2022:  FRW called patient attempt x2 and left a vm with call back information. FRW will make outreach next week  6/15/2022:  FRW called patient and left a vm with call back information. FRW will make outreach next week  2022  FRW called patient and left a vm with call back information. FRW will make outreach next week  5/10/2022: FRW received a call form [facility] Peter and we completed a on line application for rental assistance Eh stated that they did not need to apply for Cash assistance   5/10/2022:  FRW called patient and left a vm with call back information. FRW will make outreach next week  SNAP/CASH Application Screenin. Have you had [facility] benefits before?  2. How many people in the household, do you eat/buy food together?  3. What is your monthly income (include all tax members)?   4. Do you have a bank account?   5. Do you have any utility bills (electricity, rent, mortgage, phone, insurance, medical bills, etc.)?   6. Do you have social security cards and/or green cards?       Health Insurance:      Referral/Screening:  FRW Screening    Row Name [id number]         [facility] Benefits     Is patient requesting help applying for [facility] benefits? Yes  re apply for Cash and support to apply for rental assistance         Have you recently applied for any [facility] benefits? Yes         What was applied for?  CASH         Application date: 22         How many people in your household? 1         Do you buy/eat food together? Yes         What is the monthly gross income for the household (wages, social security, workers comp, and pension)?  0                   Ins[place]:     [place]? No         Is this an insurance renewal? No         Is this a new insurance application request? No                    OTHER     Is this a lita care application? No         Any other information for the FRW? SSI benefit stop because he is not [place] citizen, has SNAP $81               Goals Addressed:

## 2022-06-23 ENCOUNTER — PATIENT OUTREACH (OUTPATIENT)
Dept: CARE COORDINATION | Facility: CLINIC | Age: 82
End: 2022-06-23

## 2022-06-23 NOTE — PROGRESS NOTES
6/23/2022  Clinic Care Coordination Contact  Care Team Conversations  Received call from [facility] and stated that patient Elderly Waiver services is through [employer] and [employer] to call [phone]. She does not have  name because it is not with [facility].    Called [phone] and phone message to call [phone] because insurance starts with LMN.  Spoke to Sekou and provided NPI# and member ID and then was transfer to multiple departments.   Spoke to Ebony and explained the reason for the call and needs the contact number to  to address PCA and Homemaking services.  Who to call to talk to about home health care services.  He said to call Dept of Human Services  And that he will transfer [facility] who is the  [phone]  Provided Ebony and NPI # and member ID and he will transfer to [facility] to get the  information.    Transferred to [phone] multiple prompt messages to select, enter NPI#. Messages prompted to call back at later time due to high volume and rep busy with other calls.  Disconnected from [facility].  Called to [facility] main line [phone] and spoke to staff pleaded with her not to transfer    Stated she will have to transfer to [facility] [phone] to help with home care services  Spoke to [facility] and provided patient's demographics information.  She will look up the  her name is  Roxane Kruse  [phone].  [facility] is the supervisor for the Elderly Waiver services and to call her if we need help to look up service.  Thanked and appreciated her support.    Updated in Care Team  Roxane Kruse  [phone].  -------------------------------------------------------------  Called Roxane  [phone] and left Vm to call CHW back.   Research for Roxane carty and find another number  Roxane Kruse RN   Care Coordinator  [facility]   Phone:[phone]  Fax: [phone]    Called [phone]  and left VM to CHW back.    CHW Follow up: Monthly  CHW Plan: Follow up on goal  CHW Next Follow Up: 7-28-22     Serene Farias  Community Health Worker  [facility] Care Coordination  [email or web address]   Office: [phone]  Fax: [phone]

## 2022-06-23 NOTE — PROGRESS NOTES
6/23/2022  Clinic Care Coordination Contact  Community Health Worker Follow Up    Intervention and Education during outreach:   Called and spoke to patient's niece Eh  Peter and follow up on goal.  Patient's niece reported:  -she spoke to Roxane Kruse RN today and she will help to connect with home agency to combine services to 1 agency.  Encouraged and reinforced niece to contact Roxane for any needs or support/services.  She has Roxane's contact number.  -has interview on 7-18-22 and that Quyen is helping to get her to speak on her her behalf since daughter does not want to be involved with his citizenship process.  Informed Quyen will be back in the office 7-11-22 and wait to her from hear to prepare for the interview on 7-18-22.      CHW Follow up: Monthly  CHW Plan: Follow up on goal   CHW Next Follow Up: 7-26-22    Serene Farias  Community Health Worker  [facility]  Clinic Care Coordination  [email or web address]   Office: [phone]  Fax: [phone]    Clinic Care Coordination Contact    Community Health Worker Follow Up    Care Gaps:     Health Maintenance Due   Topic Date Due     DEPRESSION ACTION PLAN  Never done     ZOSTER IMMUNIZATION (1 of 2) Never done     FALL RISK ASSESSMENT  04/22/2022       Care Gap Goal set: Yes 8-29-22 with PCP discuss care gaps

## 2022-06-23 NOTE — PROGRESS NOTES
6/23/2022  Clinic Care Coordination Contact  Care Team Conversations    Received call back from Roxane Kruse RN Care Coordinator [facility] [phone] and explained the reason for the call.  She will call to patient's niece Eh Eh Peter and she can help to switch to combine PCA and homemaking services to one home care agency vs 2 home care agencies.  Thanked and appreciated Roxane for her support.

## 2022-06-23 NOTE — PROGRESS NOTES
6/23/2022  Print Care Plan and mailed to patient as of 6-23-22    Serene Farias  Community Health Worker  [facility] Care Coordination  [email or web address]   Office: [phone]  Fax: [phone]

## 2022-07-07 ENCOUNTER — PATIENT OUTREACH (OUTPATIENT)
Dept: CARE COORDINATION | Facility: CLINIC | Age: 82
End: 2022-07-07

## 2022-07-07 NOTE — PROGRESS NOTES
Clinic Care Coordination Contact  Program: Rental assistance   County:[facility] Case #:  [facility] Worker:   [place] #:   Subscriber #:   Renewal:  Date Applied:     FRW Outreach:   2022: FRW called patient and left a final vm with call back information as attempt x3 with no answer or return phone calls. FRW resolved the FRW episode and remove patient from panel. Please refer to FRW for future needs.   2022:  FRW called patient attempt x2 and left a vm with call back information. FRW will make outreach next week  6/15/2022:  FRW called patient and left a vm with call back information. FRW will make outreach next week  2022  FRW called patient and left a vm with call back information. FRW will make outreach next week  5/10/2022: FRW received a call form [facility] Peter and we completed a on line application for rental assistance  stated that they did not need to apply for Cash assistance   5/10/2022:  FRW called patient and left a vm with call back information. FRW will make outreach next week  SNAP/CASH Application Screenin. Have you had [facility] benefits before?  2. How many people in the household, do you eat/buy food together?  3. What is your monthly income (include all tax members)?   4. Do you have a bank account?   5. Do you have any utility bills (electricity, rent, mortgage, phone, insurance, medical bills, etc.)?   6. Do you have social security cards and/or green cards?       Health Insurance:      Referral/Screening:  FRW Screening    Row Name 22 [id number]         County Benefits     Is patient requesting help applying for [employer] benefits? Yes  re apply for Cash and support to apply for rental assistance         Have you recently applied for any [employer] benefits? Yes         What was applied for?  CASH         Application date: 22         How many people in your household? 1         Do you buy/eat food together? Yes         What is the monthly gross income for the household (wages,  social security, workers comp, and pension)?  0                   Insurance:     [place]? No         Is this an insurance renewal? No         Is this a new insurance application request? No                   OTHER     Is this a lita care application? No         Any other information for the FRW? [place] benefit stop because he is not [place] citizen, has SNAP $81               Goals Addressed:

## 2022-07-13 ENCOUNTER — MEDICAL CORRESPONDENCE (OUTPATIENT)
Dept: HEALTH INFORMATION MANAGEMENT | Facility: CLINIC | Age: 82
End: 2022-07-13

## 2022-07-26 ENCOUNTER — PATIENT OUTREACH (OUTPATIENT)
Dept: CARE COORDINATION | Facility: CLINIC | Age: 82
End: 2022-07-26

## 2022-08-02 NOTE — PROGRESS NOTES
7/27/2022  Clinic Care Coordination Contact  Care Team Conversations    Received VM from patient's niece daughter returning CHW's VM.  Stated that patient went to the citizenship interview 7-18-22 but the officer reschedule to later time because niece is not the legal guardian. She needs to get the paper work to be the legal guardian.

## 2022-08-05 ENCOUNTER — PATIENT OUTREACH (OUTPATIENT)
Dept: CARE COORDINATION | Facility: CLINIC | Age: 82
End: 2022-08-05

## 2022-08-05 NOTE — PROGRESS NOTES
8/5/2022  Clinic Care Coordination Contact  Community Health Worker Follow Up    Intervention and Education during outreach:   Called and spoke to patient's niece Lev Green returning her VM.  Niece reported they attended the interview 7-18-22 but because she is not the legal guardianship so they did not asiya the citizenship yet until she has the legal guardianship.   reschedule interview to 8-18-22   She connected with the  to help fill out the legal guardianship form for her.    Niece continue to support and connect with  Quyen Her with citizenship process.  Interview 8-18-22    Discussed any other goals or needs at this time beside the citizenship process which pt has support from  at [facility].    Discussed following up in 2 months if there is no other goals or needs at this time from CCC.  Nisalas okay to follow up in 2 months but she will call CHW if she needs help sooner.  CHW will follow up in 2 months.    Patient has completed all goals with Clinic Care Coordination.    Routed to CC RN to review the chart and confirm if maintenance is approved    CHW Follow up: 2 months   CHW Plan: discuss graduating from CC if no other goals or needs/support from [facility] team  CHW Next Follow Up: 10-5-22    Serene Farias  Community Health Worker  [facility]  Clinic Care Coordination  [email or web address]   Office: [phone]  Fax: [phone]    Clinic Care Coordination Contact    Community Health Worker Follow Up    Care Gaps:     Health Maintenance Due   Topic Date Due     DEPRESSION ACTION PLAN  Never done     ZOSTER IMMUNIZATION (1 of 2) Never done     FALL RISK ASSESSMENT  04/22/2022     Care Gap Goal set: Yes and Scheduled 8-29-22  with PCP

## 2022-08-05 NOTE — PROGRESS NOTES
8/5/2022  Clinic Care Coordination Contact  Patient has completed all goals with Clinic Care Coordination.  Please review the chart and confirm if maintenance  is approved.

## 2022-08-09 ENCOUNTER — PATIENT OUTREACH (OUTPATIENT)
Dept: CARE COORDINATION | Facility: CLINIC | Age: 82
End: 2022-08-09

## 2022-08-09 NOTE — PROGRESS NOTES
S= Situation: Chart review of clinic care coordination enrollment status   B= Background: Patient completed goals. No new needs identified   A= Action Steps:Outreach in 2 months per protocol. Clinic care coordination will be available for patient before if needed.   R= Recommendation: Transition to maintenance status  Care guide Delegations from RN CC : Please  outreach per standard work protocol

## 2022-08-09 NOTE — PROGRESS NOTES
8/9/2022  Clinic Care Coordination Contact  Care Team Conversations  Charleen Estrada, RN  Serene Farias  Caller: Unspecified (4 days ago,  1:52 PM)  Done - can move outreach out 2 months   Thanks   MADDI      Received message from CC RN that patient transition to maintenance as of  8-9-22.  CHW to follow up in 2 months.    CHW Follow up: 2 months  CHW Plan: Discuss graduating from CCC if no other goals or needs from [facility] team  CHW Next Follow Up: 10-10-22    Serene Farias  Community Health Worker  [facility] Care Coordination  [email or web address]   Office: [phone]  Fax: [phone]

## 2022-08-11 NOTE — PROGRESS NOTES
3/24/2021   Clinic Care Coordination Contact    Community Health Worker Follow Up    Goals:   Goals Addressed                 This Visit's Progress       Patient Stated      Other (pt-stated)   70%     Goal Statement: I would like to address hearing issues and get hearing aid adjusted or fixed in next 2 months.  Date Goal set: 2/4/2021  Barriers: language, hear loss  Strengths: niece/care guiver  Date to Achieve By: 4-5-21  Patients niece expressed understanding of goal: Yes  Action steps to achieve this goal:  1. Niece/care giver/PCA ( Eh Eh Peter will support to schedule appt to see Audiologist to get hearing aid adjusted or fix it.  2. Niece/PCA will call CHW if she need help and update at next outreach.    Updated: 3-24-21                Problem Solving (pt-stated)   80%     Goal Statement: I want to submit all necessary information for my citizenship process within 1-2 months  Date Goal set: 02/23/21  Barriers:   Strengths:   Date to Achieve By: 4/30/2021  Patient expressed understanding of goal: Yes  Action steps to achieve this goal:  1. Eh Eh Peter/PCA/Care Giver will wait to hear from the  Tracy at [facility] of next step.  2.  Eh Peter  will update [facility] team     [facility] Legal Services ([facility])-support to apply for Naturalization ([address])  [address]  [place] [address]  [phone]  Fax: [phone]  Intake Line [phone]    Updated: 3-24-21 [facility] [address] : [address]  Called and spoke to patient's niece/Care Giver/PCA Eh Eh Peter and follow up on goals.  Patient's niece reported:  -they spoke to  on few weeks a go (3-10-21) and she did a hearing test to see if he could hear and answer questions over the phone.  Stated he could not hear that well over the phone even with his hearing aid on.     Stated that the  will connect  Grant and may be talk to the doctor to write a letter to waiver.  Niece stated face to face  Script approved    is better then on the phone because the hearing aid is hard to hear that well.    She requested help to get more refills stated she spoke to pharmacy and that they will send something to the doctor but they have not heard back still waiting for the meds.  Stated he is out of 3 refills   Multivitamin  Tylenol Codeine  Meed for dizziness -Benzonatate     CHW sent telephone message to PCP Care team [place] for refill.      CHW Follow up: Monthly    CHW Plan: Follow up on goals    CHW Next Follow Up: 4-23-21

## 2022-08-16 ENCOUNTER — PATIENT OUTREACH (OUTPATIENT)
Dept: CARE COORDINATION | Facility: CLINIC | Age: 82
End: 2022-08-16

## 2022-08-16 NOTE — PROGRESS NOTES
Care Coordination Clinician Chart Review     Situation: Patient chart reviewed by care coordinator.?     Background: Initial assessment and enrollment to Care Coordination was 2/7/2022.??Patient centered goals were developed with participation from patient.? Lead CC handed patient off to CHW for continued outreach every 30 days.??     Assessment: Per chart review, patient outreach completed by CC CHW on 8/5/2022.? Patient is actively working to accomplish goal(s).? Patient's goal(s) remain(s) appropriate at this time.? Patient is not due for updated Plan of Care.? Annual assessment will be due 2/7/2023.      Goals    None     ??     Plan/Recommendations: The patient will continue working with Care Coordination to achieve above goal(s).? CHW will involve Lead CC as needed or if patient is ready to move to maintenance.? Lead CC will continue to monitor CHW s monthly outreaches and progress to goal(s) every 6 weeks.?     Plan of Care updated and sent to patient: No

## 2022-08-25 ENCOUNTER — PATIENT OUTREACH (OUTPATIENT)
Dept: CARE COORDINATION | Facility: CLINIC | Age: 82
End: 2022-08-25

## 2022-08-25 NOTE — PROGRESS NOTES
8/25/2022  Clinic Care Coordination - Chart Review Only    Situation/Background: Patient chart reviewed by care coordinator related to [employer] conversion.    Assessment: Patient continues to be followed by Clinic Care Coordination.    Plan: Patient's chart updated to align with [employer] program for ongoing patient management.    CHW Follow up: 2 months  CHW Plan: Discuss graduating from CCC if no other goals or needs from [facility] team  CHW Next Follow Up: 10-10-22    Serene Farias  Community Health Worker  [facility] Care Coordination  [email or web address]   Office: [phone]  Fax: [phone]

## 2022-09-07 ENCOUNTER — PATIENT OUTREACH (OUTPATIENT)
Dept: CARE COORDINATION | Facility: CLINIC | Age: 82
End: 2022-09-07

## 2022-09-07 NOTE — PROGRESS NOTES
9/7/2022  Clinic Care Coordination Contact  Community Health Worker Follow Up    Intervention and Education during outreach:   Received call from patient's niece Lev Green and reported he passed his citizenship test on 8-18-22 and got the naturalization certificate same day.  Congratulated patient.    Sally updated Social security office already and they approved for the benefit.  He has not received [place] benefit deposited into his checking account on 9-1-22.  Suggested nisalas to call [place] office or go down to [place] office to check on direct deposit to checking .  Suggested to bring checking acct and bank information and original naturalization certificate to [place] office.    CHW Follow up: 2 months  CHW Plan: Discuss graduating from CCC if no other goals or needs from CC Team  CHW Next Follow Up: 10-10-22     Serene Farias  Community Health Worker  [facility]  Clinic Care Coordination  [email or web address]   Office: [phone]  Fax: [phone]

## 2022-10-04 ENCOUNTER — OFFICE VISIT (OUTPATIENT)
Dept: FAMILY MEDICINE | Facility: CLINIC | Age: 82
End: 2022-10-04
Payer: COMMERCIAL

## 2022-10-04 VITALS
WEIGHT: 117 LBS | SYSTOLIC BLOOD PRESSURE: 120 MMHG | HEART RATE: 67 BPM | DIASTOLIC BLOOD PRESSURE: 68 MMHG | BODY MASS INDEX: 22.55 KG/M2 | TEMPERATURE: 98.3 F | RESPIRATION RATE: 20 BRPM

## 2022-10-04 DIAGNOSIS — L30.8 OTHER ECZEMA: ICD-10-CM

## 2022-10-04 DIAGNOSIS — D64.9 ANEMIA, UNSPECIFIED TYPE: ICD-10-CM

## 2022-10-04 DIAGNOSIS — Z23 NEED FOR VIRAL IMMUNIZATION: ICD-10-CM

## 2022-10-04 DIAGNOSIS — R42 DIZZINESS: ICD-10-CM

## 2022-10-04 DIAGNOSIS — K03.6 BETEL DEPOSIT ON TEETH: ICD-10-CM

## 2022-10-04 DIAGNOSIS — K14.8 DRY TONGUE: ICD-10-CM

## 2022-10-04 DIAGNOSIS — R73.03 PREDIABETES: ICD-10-CM

## 2022-10-04 DIAGNOSIS — G89.29 CHRONIC BILATERAL LOW BACK PAIN WITHOUT SCIATICA: ICD-10-CM

## 2022-10-04 DIAGNOSIS — Z13.1 SCREENING FOR DIABETES MELLITUS: ICD-10-CM

## 2022-10-04 DIAGNOSIS — F51.01 PRIMARY INSOMNIA: ICD-10-CM

## 2022-10-04 DIAGNOSIS — M54.50 CHRONIC BILATERAL LOW BACK PAIN WITHOUT SCIATICA: ICD-10-CM

## 2022-10-04 DIAGNOSIS — R79.89 ELEVATED SERUM CREATININE: Primary | ICD-10-CM

## 2022-10-04 PROBLEM — N18.31 CHRONIC KIDNEY DISEASE, STAGE 3A (H): Status: ACTIVE | Noted: 2022-10-04

## 2022-10-04 LAB
AMPHETAMINES UR QL SCN: NORMAL
ANION GAP SERPL CALCULATED.3IONS-SCNC: 10 MMOL/L (ref 7–15)
BARBITURATES UR QL SCN: NORMAL
BENZODIAZ UR QL SCN: NORMAL
BUN SERPL-MCNC: 21.6 MG/DL (ref 8–23)
BZE UR QL SCN: NORMAL
CALCIUM SERPL-MCNC: 9.5 MG/DL (ref 8.8–10.2)
CANNABINOIDS UR QL SCN: NORMAL
CHLORIDE SERPL-SCNC: 103 MMOL/L (ref 98–107)
CHOLEST SERPL-MCNC: 237 MG/DL
CREAT SERPL-MCNC: 1.14 MG/DL (ref 0.67–1.17)
CREAT UR-MCNC: 135 MG/DL
CREAT UR-MCNC: 136 MG/DL
DEPRECATED HCO3 PLAS-SCNC: 27 MMOL/L (ref 22–29)
ERYTHROCYTE [DISTWIDTH] IN BLOOD BY AUTOMATED COUNT: 14.4 % (ref 10–15)
FERRITIN SERPL-MCNC: 100 NG/ML (ref 31–409)
GFR SERPL CREATININE-BSD FRML MDRD: 63 ML/MIN/1.73M2
GLUCOSE SERPL-MCNC: 85 MG/DL (ref 70–99)
HBA1C MFR BLD: 5.9 % (ref 0–5.6)
HCT VFR BLD AUTO: 39.3 % (ref 40–53)
HDLC SERPL-MCNC: 61 MG/DL
HGB BLD-MCNC: 12.8 G/DL (ref 13.3–17.7)
IRON BINDING CAPACITY (ROCHE): 292 UG/DL (ref 240–430)
IRON SATN MFR SERPL: 38 % (ref 15–46)
IRON SERPL-MCNC: 112 UG/DL (ref 61–157)
LDLC SERPL CALC-MCNC: 153 MG/DL
MCH RBC QN AUTO: 27.3 PG (ref 26.5–33)
MCHC RBC AUTO-ENTMCNC: 32.6 G/DL (ref 31.5–36.5)
MCV RBC AUTO: 84 FL (ref 78–100)
MICROALBUMIN UR-MCNC: <12 MG/L
MICROALBUMIN/CREAT UR: NORMAL MG/G{CREAT}
NONHDLC SERPL-MCNC: 176 MG/DL
OPIATES UR QL SCN: NORMAL
OXYCODONE UR QL: NORMAL
PCP QUAL URINE (ROCHE): NORMAL
PLATELET # BLD AUTO: 212 10E3/UL (ref 150–450)
POTASSIUM SERPL-SCNC: 4.4 MMOL/L (ref 3.4–5.3)
RBC # BLD AUTO: 4.69 10E6/UL (ref 4.4–5.9)
SODIUM SERPL-SCNC: 140 MMOL/L (ref 136–145)
TRIGL SERPL-MCNC: 117 MG/DL
WBC # BLD AUTO: 7.1 10E3/UL (ref 4–11)

## 2022-10-04 PROCEDURE — 99214 OFFICE O/P EST MOD 30 MIN: CPT | Performed by: FAMILY MEDICINE

## 2022-10-04 PROCEDURE — 83540 ASSAY OF IRON: CPT | Performed by: FAMILY MEDICINE

## 2022-10-04 PROCEDURE — 82043 UR ALBUMIN QUANTITATIVE: CPT | Performed by: FAMILY MEDICINE

## 2022-10-04 PROCEDURE — 80307 DRUG TEST PRSMV CHEM ANLYZR: CPT | Performed by: FAMILY MEDICINE

## 2022-10-04 PROCEDURE — 0124A COVID-19,PF,PFIZER BOOSTER BIVALENT: CPT | Performed by: FAMILY MEDICINE

## 2022-10-04 PROCEDURE — 83550 IRON BINDING TEST: CPT | Performed by: FAMILY MEDICINE

## 2022-10-04 PROCEDURE — 85027 COMPLETE CBC AUTOMATED: CPT | Performed by: FAMILY MEDICINE

## 2022-10-04 PROCEDURE — 80061 LIPID PANEL: CPT | Performed by: FAMILY MEDICINE

## 2022-10-04 PROCEDURE — 83036 HEMOGLOBIN GLYCOSYLATED A1C: CPT | Performed by: FAMILY MEDICINE

## 2022-10-04 PROCEDURE — 36415 COLL VENOUS BLD VENIPUNCTURE: CPT | Performed by: FAMILY MEDICINE

## 2022-10-04 PROCEDURE — 82728 ASSAY OF FERRITIN: CPT | Performed by: FAMILY MEDICINE

## 2022-10-04 PROCEDURE — 80048 BASIC METABOLIC PNL TOTAL CA: CPT | Performed by: FAMILY MEDICINE

## 2022-10-04 PROCEDURE — 91312 COVID-19,PF,PFIZER BOOSTER BIVALENT: CPT | Performed by: FAMILY MEDICINE

## 2022-10-04 RX ORDER — TRIAMCINOLONE ACETONIDE 0.25 MG/G
CREAM TOPICAL
Qty: 30 G | Refills: 1 | Status: SHIPPED | OUTPATIENT
Start: 2022-10-04 | End: 2024-04-01

## 2022-10-04 RX ORDER — MECLIZINE HYDROCHLORIDE 25 MG/1
25 TABLET ORAL 3 TIMES DAILY PRN
Qty: 90 TABLET | Refills: 3 | Status: SHIPPED | OUTPATIENT
Start: 2022-10-04 | End: 2023-01-05

## 2022-10-04 RX ORDER — RAMELTEON 8 MG/1
8 TABLET ORAL AT BEDTIME
Qty: 30 TABLET | Refills: 5 | Status: SHIPPED | OUTPATIENT
Start: 2022-10-04 | End: 2023-01-05

## 2022-10-04 ASSESSMENT — PATIENT HEALTH QUESTIONNAIRE - PHQ9
SUM OF ALL RESPONSES TO PHQ QUESTIONS 1-9: 8
10. IF YOU CHECKED OFF ANY PROBLEMS, HOW DIFFICULT HAVE THESE PROBLEMS MADE IT FOR YOU TO DO YOUR WORK, TAKE CARE OF THINGS AT HOME, OR GET ALONG WITH OTHER PEOPLE: SOMEWHAT DIFFICULT
SUM OF ALL RESPONSES TO PHQ QUESTIONS 1-9: 8

## 2022-10-04 NOTE — PROGRESS NOTES
Teodoro was seen today for office visit and refill request.    Diagnoses and all orders for this visit:    Elevated serum creatinine  Comments:  creatinine level fluctuates. 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 and normal creatinine level. suspect when he is not feeling well isn't hydrating and causes the fluctuation  Orders:  -     Basic metabolic panel  (Ca, Cl, CO2, Creat, Gluc, K, Na, BUN); Future  -     Lipid Profile (Chol, Trig, HDL, LDL calc); Future  -     Basic metabolic panel  (Ca, Cl, CO2, Creat, Gluc, K, Na, BUN)  -     Lipid Profile (Chol, Trig, HDL, LDL calc)    Dry tongue  Comments:  resolved for now. i recommended they see dentist for an oral exam. given betel nut and tobacco use. should assess for cancer. very poor dentition. 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 will work on diet and exercise changes. consider seeing diabetic educator    Other eczema  Comments:  stable. refill  Orders:  -     triamcinolone (KENALOG) 0.025 % cream; [TRIAMCINOLONE (KENALOG) 0.025 % CREAM] Apply thin layer to affected area twice a day    Primary insomnia  Comments:  improved. continue ramelteon    Orders:  -     ramelteon (ROZEREM) 8 MG tablet; Take 1 tablet (8 mg) by mouth At Bedtime    Dizziness  Comments:  chronic issue. Stable on meclizine.   Orders:  -     meclizine (ANTIVERT) 25 MG tablet; Take 1 tablet (25 mg) by mouth 3 times daily as needed for dizziness    Anemia, unspecified type  Comments:  recheck labs today to see if any improvement from June. unknown cause at this point. Consider colonoscopy  Orders:  -     CBC with platelets; Future  -     Ferritin; Future  -     Iron and iron binding capacity; Future  -     CBC with platelets  -     Ferritin  -     Iron and iron binding capacity    Screening for diabetes mellitus  Comments:  due to hx of dry mouth sensation. haven't gotten a1c in awhile.  Orders:  -     Hemoglobin A1c; Future  -      Hemoglobin A1c    Chronic bilateral low back pain without sciatica  -     Urine Drugs of Abuse Screen Panel 1 - Drug Screen (Full); Future  -     Urine Drugs of Abuse Screen Panel 1 - Drug Screen (Full)    Need for viral immunization  -     COVID-19,PF,PFIZER BOOSTER BIVALENT    Other orders  -     Albumin Random Urine Quantitative with Creat Ratio; Future  -     Albumin Random Urine Quantitative with Creat Ratio          Ivana Valerio is a 82 year old accompanied by his care giver, presenting for the following health issues:  office visit (Follow up on dry tongue, review and discuss care gaps) and Refill Request      History of Present Illness       Reason for visit:  Follow up on dry tongue    He eats 2-3 servings of fruits and vegetables daily.He consumes 0 sweetened beverage(s) daily.He exercises with enough effort to increase his heart rate 9 or less minutes per day.  He exercises with enough effort to increase his heart rate 3 or less days per week.   He is taking medications regularly.    Today's PHQ-9         PHQ-9 Total Score: 8    PHQ-9 Q9 Thoughts of better off dead/self-harm past 2 weeks :   Not at all    How difficult have these problems made it for you to do your work, take care of things at home, or get along with other people: Somewhat difficult     No more dry tongue.   Still doing the betel nut. Will not stop.   And a little bit of tobacco too.   In august he had dizziness with sweating.   First time i see that much dizziness  Gave dizziness medicine   Take medicine and he feel better.   No CP or SOB.  Check gabapentin with kidneys    Review of Systems   Constitutional, HEENT, cardiovascular, pulmonary, gi and gu systems are negative, except as otherwise noted.      Objective    /68 (BP Location: Left arm, Patient Position: Sitting, Cuff Size: Adult Regular)   Pulse 67   Temp 98.3  F (36.8  C) (Temporal)   Resp 20   Wt 53.1 kg (117 lb)   BMI 22.55 kg/m    Body mass index is 22.55  kg/m .  Physical Exam   GENERAL: alert, no distress, frail and elderly  EYES: Eyes grossly normal to inspection, PERRL and conjunctivae and sclerae normal  HENT: oral mucous membranes moist and poor dentition. Betel nut stains. Difficult to tell if there are any Lesions?  RESP: lungs clear to auscultation - no rales, rhonchi or wheezes  CV: regular rate and rhythm, normal S1 S2, no S3 or S4, no murmur, click or rub, no peripheral edema and peripheral pulses strong

## 2022-10-06 ENCOUNTER — TELEPHONE (OUTPATIENT)
Dept: FAMILY MEDICINE | Facility: CLINIC | Age: 82
End: 2022-10-06

## 2022-10-06 PROBLEM — N18.31 CHRONIC KIDNEY DISEASE, STAGE 3A (H): Status: RESOLVED | Noted: 2022-10-04 | Resolved: 2022-10-06

## 2022-10-06 PROBLEM — L30.8 OTHER ECZEMA: Status: ACTIVE | Noted: 2022-10-06

## 2022-10-06 PROBLEM — R73.03 PREDIABETES: Status: ACTIVE | Noted: 2022-10-06

## 2022-10-06 PROBLEM — R42 DIZZINESS: Status: ACTIVE | Noted: 2022-10-06

## 2022-10-06 PROBLEM — D64.9 ANEMIA, UNSPECIFIED TYPE: Status: ACTIVE | Noted: 2022-10-06

## 2022-10-06 NOTE — TELEPHONE ENCOUNTER
Called pt regarding the test result. Pt stated he does not have blood in stools. Pt doesn't want to meet with diabetic educator either.                 ----- Message from Merary Montes DO sent at 10/6/2022 10:30 AM CDT -----  Overall labs look pretty good. His kidneys are back to normal. I am not sure what was happening in June to affect them. They seem to waver. If he is not feeling well he might not eat and drink well and this will have an affect on it.   Iron studies normal. Continues with a mildly low hemoglobin or blood level. Does he have any blood in his stool?  He does have a new diagnosis of prediabetes. Let me know if they would like to meet with diabetic educator to go over

## 2022-10-10 ENCOUNTER — PATIENT OUTREACH (OUTPATIENT)
Dept: CARE COORDINATION | Facility: CLINIC | Age: 82
End: 2022-10-10

## 2022-10-10 NOTE — PROGRESS NOTES
10/10/2022  Clinic Care Coordination Contact  [facility]/Voicemail  Maintenance 2 months Follow up  Anamika Garcianicole: Heather  ID# [id number]  Clinical Data: Care Coordinator Outreach  Outreach attempted x 1.  Left message on patient's voicemail with call back information and requested return call.  Plan: Care Coordinator  will try to reach patient again in 10 business days.    CHW follow up: 10-18-22    Serene Farias  Community Health Worker  [facility] Care Coordination  [email or web address]   Office: [phone]  Fax: [phone]

## 2022-10-12 ENCOUNTER — PATIENT OUTREACH (OUTPATIENT)
Dept: CARE COORDINATION | Facility: CLINIC | Age: 82
End: 2022-10-12

## 2022-10-12 NOTE — PROGRESS NOTES
10/12/2022  Clinic Care Coordination Contact  Community Health Worker Follow Up    Intervention and Education during outreach:   Maintenance 2 months follow up    Called and spoke to patient's niece Eh Eh Peter and patient Maintenance 2 months follow up call.  Discussed with patient and PCA/niece Eh Eh Peter if they have any new goals or other needs from CC team before graduating from [employer] [facility].  Patient and niece stated they don't have new other goals or needs at this time.    Patient and niece okay to graduate from [employer] [facility].  Patient's niece Eh Eh Peter has CHW contact information.   Niece will call CHW, if they need help again from [employer] [facility] team in the future    Patient has completed all goals with Clinic Care Coordination.    Routed to  SW to review the chart and confirm if graduation is approved    Serene Farias  Community Health Worker  [facility] Care Coordination  [email or web address]   Office: [phone]  Fax: [phone]

## 2022-10-12 NOTE — PROGRESS NOTES
10/12/2022  Clinic Care Coordination Contact  Patient has completed all goals with Clinic Care Coordination.  Please review the chart and confirm if graduation is approved.    Serene Farias  Community Health Worker  [facility]  Clinic Care Coordination  [email or web address]   Office: [phone]  Fax: [phone]

## 2022-10-13 ENCOUNTER — PATIENT OUTREACH (OUTPATIENT)
Dept: CARE COORDINATION | Facility: CLINIC | Age: 82
End: 2022-10-13

## 2022-10-13 NOTE — PROGRESS NOTES
Clinic Care Coordination Contact    Assessment: Care Coordinator contacted patient for 2 month follow up.  Patient has continued to follow the plan of care and assessment is negative for any new needs or concerns.    Enrollment status: Graduated.      Plan: No further outreaches at this time.  Patient will continue to follow the plan of care.  If new needs arise a new Care Coordination referral may be placed.  FYI to PCP    JASKARAN Jordan  [facility]

## 2022-10-13 NOTE — LETTER
[facility] CARE COORDINATION  [address] [place] [address]  October 13, 2022    Teodoro Tin  [address]  [place] [address]    Dear Teodoro,  Your Care Team congratulates you on your journey to maintain wellness. This document will help guide you on your journey to maintain a healthy lifestyle.  You can use this to help you overcome any barriers you may encounter.  If you should have any questions or concerns, you can contact the members of your Care Team or contact your Primary Care Clinic for assistance.     Health Maintenance  Health Maintenance Reviewed:      My Access Plan  Medical Emergency 911   Primary Clinic Line [facility] [phone]   24 Hour Appointment Line [phone] or  [phone]) (toll-free)   24 Hour Nurse Line [phone] (toll-free)   Preferred Urgent Care     Preferred Hospital     Preferred Pharmacy [facility] - [place], [place] - [address] Behavioral Health Crisis Line The National Suicide Prevention Lifeline at 1-953.851.7999 or 911     My Care Team Members  Patient Care Team       Relationship Specialty Notifications Start End    Merary Montes DO PCP - General Family Practice  8/23/19     Phone: [phone] Fax: [phone]         [address] [place] [address]    Merary Montes DO Assigned PCP   10/17/21     Phone: [phone] Fax: [phone]         [address] [place] [address]    Serene Farias Community Health Worker Primary Care - CC Admissions 2/7/22 10/13/22    Phone: [phone] Fax: [phone]         [address] [place] [address]    Nikki Lima LSW Lead Care Coordinator Primary Care - CC Admissions 2/7/22 10/13/22    Roxane Kruse, RN Care Coordinator Blue Cross Registered Nurse   6/23/22     [facility] Services-manage Elderly Waiver services-PCA and homemaking    Phone: [phone] Fax: [phone]        Care Coordinator [facility]  Phone:[phone]  Fax:  [phone]               Goals        COMPLETED: Other (pt-stated)       Goal Statement: I completed my annual wellness visit on 3-9-22  Date Goal set: 3-7-2022  Barriers: language  Strengths: support from CCC team  Date to Achieve By: Completed 3-  Patient expressed understanding of goal: yes  Action steps to achieve this goal:    [facility] [address] [place] [address]  [phone]  24/7 Care Connection to connect with Triage nurse [phone]    [address] [address]  [phone]  Walk-in Care Hours   Monday - Friday, 7:00 a.m. to 7:00 p.m.   Saturday & Sunday, 8:00 a.m. to 4:00 p.m           COMPLETED: Other (pt-stated)       Goal Statement: I received SNAP benefit from [facility]   Date Goal set: 5/5/2022  Barriers: language  Strengths: support from CCC team, [facility]  Date to Achieve By:Completed 6-21-22  Patient expressed understanding of goal: yes  [facility] Services  [address] [place] [address]  [phone] EZ info line to checkon benefits  [phone]            COMPLETED: Other (pt-stated)       Goal Statement: I attended and completed Medicare Annual Wellness Visit on 6-6-22  Date Goal set: 5-5-2022  Barriers: language  Strengths: support from CCC team  Date to Achieve By: completed 6-14-22  Patient expressed understanding of goal: yes    Niece Eh Eh Peter will support to follow up with PCP as recommended or as scheduled.  [facility] [address] [place] [address]  [phone]  24/7 Care Connection to connect with Triage nurse [phone]    [address] [address]  [phone]  Walk-in Care Hours   Monday - Friday, 7:00 a.m. to 7:00 p.m.   Saturday & Sunday, 8:00 a.m. to 4:00 p.m                 COMPLETED: Other (pt-stated)       Goal Statement: I have support from Roxane Kruse RN at [facility]  Physicians Services for home care services and other needs.  Date Goal set: 6/21/2022  Barriers: language  Strengths: support from CCC team  Date to Achieve By: Completed 6-23-22  Patient expressed understanding of goal: yes     Sally Green will continue to call to Roxane Kruse RN at [facility]  [phone] about Elderly Waiver services and support like  PCA and Homemaking services.          COMPLETED: Psychosocial (pt-stated)       Goal Statement: I have support from  from [facility] for support to process of my citizenship.  Date Goal set: 02/07/22 Updated 5-5-22  Barriers: Language  Strengths:   Date to Achieve By: 6-  Patient expressed understanding of goal: Yes    Lev Green will wait to hear from Quyen Her when the interview is reschedule to.  Quyen Her helping to get the guardianship established to be able to can attend USS interview.  [facility] Services ([facility])-support to apply for Naturalization ([address])  [address]  [place] [address]  [phone]  Fax: [phone]  Intake Line [phone]                 Advance Care Plans/Directives Type:       It has been your Clinic Care Team's pleasure to work with you on your goals.    Regards,  Your Clinic Care Team

## 2022-12-27 DIAGNOSIS — M25.562 CHRONIC PAIN OF LEFT KNEE: ICD-10-CM

## 2022-12-27 DIAGNOSIS — F11.90 CHRONIC, CONTINUOUS USE OF OPIOIDS: Primary | ICD-10-CM

## 2022-12-27 DIAGNOSIS — G89.29 CHRONIC PAIN OF LEFT KNEE: ICD-10-CM

## 2022-12-27 DIAGNOSIS — M54.50 BILATERAL LOW BACK PAIN WITHOUT SCIATICA, UNSPECIFIED CHRONICITY: ICD-10-CM

## 2022-12-27 NOTE — TELEPHONE ENCOUNTER
Chronic med.  reviewed. Has appt coming up. Refilled #10 pills to get him through.    Future Appointments   Date Time Provider Department Center   1/5/2023  2:00 PM Tricia Cohn MD [facility]      Health Maintenance Due   Topic Date Due     DEPRESSION ACTION PLAN  Never done     ZOSTER IMMUNIZATION (1 of 2) Never done     MEDICARE ANNUAL WELLNESS VISIT  02/19/2021     ANNUAL REVIEW OF HM ORDERS  10/06/2022     BP Readings from Last 3 Encounters:   10/04/22 120/68   06/06/22 138/62   03/09/22 111/74     Diagnoses and all orders for this visit:    Chronic, continuous use of opioids  -     acetaminophen-codeine 300-30 MG tablet; Take 1 tablet by mouth every 12 hours as needed for moderate to severe pain    Bilateral low back pain without sciatica, unspecified chronicity  -     acetaminophen-codeine 300-30 MG tablet; Take 1 tablet by mouth every 12 hours as needed for moderate to severe pain    Chronic pain of left knee  Comments:  Stable on the Tylenol 3's.  This does help his pain when he takes it.  Urine drug screen ordered today for ongoing opioid use  Orders:  -     acetaminophen-codeine 300-30 MG tablet; Take 1 tablet by mouth every 12 hours as needed for moderate to severe pain

## 2023-01-05 ENCOUNTER — OFFICE VISIT (OUTPATIENT)
Dept: FAMILY MEDICINE | Facility: CLINIC | Age: 83
End: 2023-01-05
Payer: COMMERCIAL

## 2023-01-05 VITALS
BODY MASS INDEX: 20.43 KG/M2 | DIASTOLIC BLOOD PRESSURE: 63 MMHG | OXYGEN SATURATION: 96 % | HEART RATE: 82 BPM | SYSTOLIC BLOOD PRESSURE: 111 MMHG | RESPIRATION RATE: 18 BRPM | TEMPERATURE: 97.7 F | WEIGHT: 106 LBS

## 2023-01-05 DIAGNOSIS — R42 DIZZINESS: ICD-10-CM

## 2023-01-05 DIAGNOSIS — H04.123 DRY EYES: ICD-10-CM

## 2023-01-05 DIAGNOSIS — M54.2 NECK PAIN: Primary | ICD-10-CM

## 2023-01-05 DIAGNOSIS — F32.0 MILD SINGLE CURRENT EPISODE OF MAJOR DEPRESSIVE DISORDER (H): ICD-10-CM

## 2023-01-05 DIAGNOSIS — M25.562 CHRONIC PAIN OF LEFT KNEE: ICD-10-CM

## 2023-01-05 DIAGNOSIS — F11.90 CHRONIC, CONTINUOUS USE OF OPIOIDS: ICD-10-CM

## 2023-01-05 DIAGNOSIS — F51.01 PRIMARY INSOMNIA: ICD-10-CM

## 2023-01-05 DIAGNOSIS — M54.50 BILATERAL LOW BACK PAIN WITHOUT SCIATICA, UNSPECIFIED CHRONICITY: ICD-10-CM

## 2023-01-05 DIAGNOSIS — G89.29 CHRONIC PAIN OF LEFT KNEE: ICD-10-CM

## 2023-01-05 DIAGNOSIS — R63.0 POOR APPETITE: ICD-10-CM

## 2023-01-05 LAB — CREAT UR-MCNC: 191 MG/DL

## 2023-01-05 PROCEDURE — 80307 DRUG TEST PRSMV CHEM ANLYZR: CPT | Performed by: STUDENT IN AN ORGANIZED HEALTH CARE EDUCATION/TRAINING PROGRAM

## 2023-01-05 PROCEDURE — 99214 OFFICE O/P EST MOD 30 MIN: CPT | Performed by: STUDENT IN AN ORGANIZED HEALTH CARE EDUCATION/TRAINING PROGRAM

## 2023-01-05 RX ORDER — OLOPATADINE HYDROCHLORIDE 1 MG/ML
1 SOLUTION/ DROPS OPHTHALMIC 2 TIMES DAILY
Qty: 10 ML | Refills: 1 | Status: SHIPPED | OUTPATIENT
Start: 2023-01-05 | End: 2023-11-09

## 2023-01-05 RX ORDER — MIRTAZAPINE 15 MG/1
15 TABLET, FILM COATED ORAL AT BEDTIME
Qty: 90 TABLET | Refills: 3 | Status: SHIPPED | OUTPATIENT
Start: 2023-01-05 | End: 2024-02-07

## 2023-01-05 RX ORDER — MELOXICAM 7.5 MG/1
7.5 TABLET ORAL DAILY PRN
Qty: 30 TABLET | Refills: 1 | Status: SHIPPED | OUTPATIENT
Start: 2023-01-05 | End: 2023-11-09

## 2023-01-05 RX ORDER — MECLIZINE HYDROCHLORIDE 25 MG/1
25 TABLET ORAL 3 TIMES DAILY PRN
Qty: 90 TABLET | Refills: 3 | Status: SHIPPED | OUTPATIENT
Start: 2023-01-05 | End: 2023-07-15

## 2023-01-05 RX ORDER — RAMELTEON 8 MG/1
8 TABLET ORAL AT BEDTIME
Qty: 30 TABLET | Refills: 5 | Status: SHIPPED | OUTPATIENT
Start: 2023-01-05 | End: 2023-11-09

## 2023-01-05 ASSESSMENT — PATIENT HEALTH QUESTIONNAIRE - PHQ9
SUM OF ALL RESPONSES TO PHQ QUESTIONS 1-9: 7
SUM OF ALL RESPONSES TO PHQ QUESTIONS 1-9: 7
10. IF YOU CHECKED OFF ANY PROBLEMS, HOW DIFFICULT HAVE THESE PROBLEMS MADE IT FOR YOU TO DO YOUR WORK, TAKE CARE OF THINGS AT HOME, OR GET ALONG WITH OTHER PEOPLE: SOMEWHAT DIFFICULT

## 2023-01-05 ASSESSMENT — ANXIETY QUESTIONNAIRES
GAD7 TOTAL SCORE: 0
GAD7 TOTAL SCORE: 0
6. BECOMING EASILY ANNOYED OR IRRITABLE: NOT AT ALL
5. BEING SO RESTLESS THAT IT IS HARD TO SIT STILL: NOT AT ALL
8. IF YOU CHECKED OFF ANY PROBLEMS, HOW DIFFICULT HAVE THESE MADE IT FOR YOU TO DO YOUR WORK, TAKE CARE OF THINGS AT HOME, OR GET ALONG WITH OTHER PEOPLE?: NOT DIFFICULT AT ALL
1. FEELING NERVOUS, ANXIOUS, OR ON EDGE: NOT AT ALL
IF YOU CHECKED OFF ANY PROBLEMS ON THIS QUESTIONNAIRE, HOW DIFFICULT HAVE THESE PROBLEMS MADE IT FOR YOU TO DO YOUR WORK, TAKE CARE OF THINGS AT HOME, OR GET ALONG WITH OTHER PEOPLE: NOT DIFFICULT AT ALL
4. TROUBLE RELAXING: NOT AT ALL
3. WORRYING TOO MUCH ABOUT DIFFERENT THINGS: NOT AT ALL
GAD7 TOTAL SCORE: 0
2. NOT BEING ABLE TO STOP OR CONTROL WORRYING: NOT AT ALL
7. FEELING AFRAID AS IF SOMETHING AWFUL MIGHT HAPPEN: NOT AT ALL
7. FEELING AFRAID AS IF SOMETHING AWFUL MIGHT HAPPEN: NOT AT ALL

## 2023-01-05 NOTE — LETTER
Primary Care Center ([facility]) Contract: Opioid Prescription  I understand that my provider, ____________________________, is prescribing an opioid medication to assist me in managing my chronic pain and assist me in improving my daily function and activity level. If my activity level or general function changes, the medication may be changed or discontinued. I understand that my provider is not required to prescribe this medication. The risks, side effects, and benefits of taking this medication have been explained to me and I agree to the following conditions related to this treatment. Failure to adhere to these conditions will result in discontinuation of this medication and possible termination of care from the Primary Care Center.     I will take my medication as prescribed. I will not change the amount or frequency of the medication without provider approval.    I will only receive these medications from _____________________________________________ (or my provider s designee as determined by my provider).    I am being prescribed opioid medications to treat the following condition(s):________________________________________________.    If I am hospitalized or seen in an emergency department or urgent care for a condition that results in a prescription for another controlled substance (such as anti-anxiety, additional pain medication, sleep aid, or stimulants), I will inform the clinic within one business day of the additional medication.    I will notify the clinic within one business day if I seek any care elsewhere related to this medication.    I will participate in all additional treatments that my provider recommends, such as physical therapy or consultations with a pain or mental health specialist.     I will notify my provider of any history of addiction or current chemical dependence.    I will not use illegal drugs (includes marijuana).     I will comply with random drug screening to confirm  proper use of my medication.    I will comply with state law. I understand that I may not be able to operate a motor vehicle while taking these medications. I will not participate in any activities that will put myself or others at physical risk while taking any medications.    I will not give, sell, or trade my medications to anyone else.      I will not take someone else s medications.    I will not forge or change my prescriptions in any way.    I understand I will have one clinic appointment every __________months (or more frequently) as determined by my provider.    I understand it is my responsibility to schedule future appointments with my provider at the end of each visit.    I will not request early refills. I understand that lost and/or stolen prescriptions/medications will not be replaced.    I will request refills of these medications in the following manner:    o I am responsible to keep track of my medications and plan ahead to arrange for refills. It is my responsibility to ensure that I do not run out of medication.  o I will call [phone] and request a refill of my opioid medication(s).  All other medication refill request should be requested through your pharmacy.  o I will give the clinic one full week notice prior to needing a refill.  o I will not walk in or call into the clinic and request this medication to be refilled same day (I understand I must give a full week notice of the need for refill).  o I understand that these prescriptions will be dispensed monthly with a maximum of one-month supply with no refills.  o Prescriptions will only be available to be picked up during regular office hours (7:30am-5:00pm Monday-Friday). Refills will not be provided at night, on weekends, or during holidays.  I will treat my provider and clinic staff with respect. I will not yell, name-call, shout, or use offensive or vulgar language. I will not threaten or act in an intimidating manner on the  telephone or while in the clinic toward my provider or clinic staff.  I agree if I break this agreement, my physician reserves the right to stop prescribing the controlled substance for me - my medications will be discontinued in a medically safe fashion, and I will not be allowed to get pain medications from any other provider in this clinic and/or it may result in a termination of care from this clinic.       I understand this contract is in effect for all current and future opioid prescriptions received through the Primary Care Center.          _________________________________________________________________________                _________________  Signature of Patient or Personal Representative (state relationship)                                        Date    _________________________________________________________________________                 ________________                                              Signature of Provider (also please print name)                                                                               Date

## 2023-01-05 NOTE — PROGRESS NOTES
Assessment & Plan     Bilateral low back pain without sciatica, unspecified chronicity  Chronic pain of left knee  Chronic, continuous use of opioids  Patient on chronic opiates.  PDMP reviewed.  Refilled 14 tabs today.  - acetaminophen-codeine 300-30 MG tablet  Dispense: 14 tablet; Refill: 0  - Drug Confirmation Panel Urine with Creat - lab collect  - Drug Confirmation Panel Urine with Creat - lab collect    Poor appetite  Chronic, stable.  Would like refill on multivitamin and mirtazapine.  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  - mirtazapine (REMERON) 15 MG tablet  Dispense: 90 tablet; Refill: 3    Dizziness  Chronic stable, would like referral meclizine.  - meclizine (ANTIVERT) 25 MG tablet  Dispense: 90 tablet; Refill: 3    Dry eyes  Chronic, stable would like refill.  - olopatadine (PATANOL) 0.1 % ophthalmic solution  Dispense: 10 mL; Refill: 1    Primary insomnia  Chronic stable would like refill on ramelteon.  - ramelteon (ROZEREM) 8 MG tablet  Dispense: 30 tablet; Refill: 5    Neck pain  Chronic stable would like refill on meloxicam.  - meloxicam (MOBIC) 7.5 MG tablet  Dispense: 30 tablet; Refill: 1        Return in about 4 weeks (around 2/2/2023) for Routine preventive.    Tricia Cohn MD  [facility]    Ivana Valerio is a 83 year old accompanied by his daughter, presenting for the following health issues:  RECHECK (3 month follow up, MED REFILL )      History of Present Illness       Reason for visit:  Follow up    He eats 2-3 servings of fruits and vegetables daily.He consumes 0 sweetened beverage(s) daily.He exercises with enough effort to increase his heart rate 9 or less minutes per day.  He exercises with enough effort to increase his heart rate 7 days per  week.   He is taking medications regularly.    Today's PHQ-9         PHQ-9 Total Score: 7    PHQ-9 Q9 Thoughts of better off dead/self-harm past 2 weeks :   Not at all    How difficult have these problems made it for you to do your work, take care of things at home, or get along with other people: Somewhat difficult  Today's ROMEO-7 Score: 0       Chronic back pain  Chronic Opioids  - pt reports that back pain mainly located at the neck on the sides.   - has been on gabapentin 600mg BID, does not feel that it helps and would like to stop. Wondering if that is OK.   - Denies side effects from it.   - Is taking tylenol 3 when pain is really bad. Only takes it once a day when back pain is bed.   - Denies red flags, including: Fever, history of IV drug use or malignancy, trauma or injury, loss of bowel or bladder control (occasional only), saddle anesthesia, numbness, weakness, or tingling of lower extremities.       Dizziness  - meclizine works well, however, feels that episodes seem to be getting more often.     Denies other concerns. Just needs refills.         Review of Systems   Constitutional: Negative for fever and chills.   Respiratory: Negative for cough or shortness of breath.    Cardiovascular: Negative for chest pain or chest pressure.   Gastrointestinal: Negative for nausea, vomiting, diarrhea or abdominal pain.        Objective    /63 (BP Location: Left arm, Patient Position: Sitting, Cuff Size: Adult Regular)   Pulse 82   Temp 97.7  F (36.5  C) (Temporal)   Resp 18   Wt 48.1 kg (106 lb)   SpO2 96%   BMI 20.43 kg/m    Body mass index is 20.43 kg/m .  Physical Exam   PHYSICAL EXAM  General: Well developed, well nourished.  Skin:  Dry without rash.    Head:  Normocephalic-atraumatic.    Eye:  Normal conjunctivae.     Respiratory:  Normal respiratory effort.   Gastrointestinal:  Non-distended.    Musculoskeletal:  No deformity or edema.  Neurologic: No focal deficits. Uses cane to walk.

## 2023-01-09 LAB
CODEINE UR CFM-MCNC: 324 NG/ML
CODEINE/CREAT UR: 170 NG/MG {CREAT}
GABAPENTIN UR QL CFM: PRESENT
MORPHINE UR CFM-MCNC: 76 NG/ML
MORPHINE/CREAT UR: 40 NG/MG {CREAT}
NORCODEINE UR-MCNC: 102 NG/ML
NORCODEINE/CREAT UR CFM: 53 NG/MG {CREAT}

## 2023-01-10 ENCOUNTER — TELEPHONE (OUTPATIENT)
Dept: FAMILY MEDICINE | Facility: CLINIC | Age: 83
End: 2023-01-10
Payer: COMMERCIAL

## 2023-01-10 NOTE — LETTER
January 23, 2023      Teodoro Gregorio  [address]  [place] [address]        Dear  and Caregiver,    We are writing to inform you of your test results.    Teodoro's drug test was consistent with pain medications is prescribed.        Resulted Orders   Urine Drug Confirmation Panel   Result Value Ref Range    Codeine ng/mL 324 (H) <50 ng/mL    Codeine 170 Absent ng/mg [creat]      Comment:      Codeine is a scheduled prescription medication.    Morphine ng/mL 76 (H) <50 ng/mL    Morphine 40 Absent ng/mg [creat]    Norcodeine ng/mL 102 (H) <50 ng/mL    Norcodeine 53 Absent ng/mg [creat]      Comment:      Norcodone is an expected metabolite of codeine.    Gabapentin (Neurontin) Present (A) Absent      Comment:      Sources of gabapentin are prescription medications.    Narrative    This test was developed and its performance characteristics determined by the [facility],  Special Chemistry Laboratory. It has not been cleared or approved by the FDA. The laboratory is regulated under CLIA as qualified to perform high-complexity testing. This test is used for clinical purposes. It should not be regarded as investigational or for research.   Urine Creatinine for Drug Screen Panel   Result Value Ref Range    Creatinine Urine for Drug Screen 191 mg/dL      Comment:      The reference range has not been established for creatinine in random urines. The results should be integrated into the clinical context for interpretation.       If you have any questions or concerns, please call the clinic at the number listed above.       Sincerely,      Dr. Cohn

## 2023-01-10 NOTE — TELEPHONE ENCOUNTER
----- Message from Tricia Cohn MD sent at 1/9/2023  3:48 PM CST -----  Please call pt to inform him/caretaker that pt's drug test was consistent with pain medications is prescribed.

## 2023-01-16 ASSESSMENT — PATIENT HEALTH QUESTIONNAIRE - PHQ9
SUM OF ALL RESPONSES TO PHQ QUESTIONS 1-9: 7
10. IF YOU CHECKED OFF ANY PROBLEMS, HOW DIFFICULT HAVE THESE PROBLEMS MADE IT FOR YOU TO DO YOUR WORK, TAKE CARE OF THINGS AT HOME, OR GET ALONG WITH OTHER PEOPLE: SOMEWHAT DIFFICULT

## 2023-01-16 ASSESSMENT — ANXIETY QUESTIONNAIRES: GAD7 TOTAL SCORE: 0

## 2023-03-30 ENCOUNTER — OFFICE VISIT (OUTPATIENT)
Dept: FAMILY MEDICINE | Facility: CLINIC | Age: 83
End: 2023-03-30
Payer: COMMERCIAL

## 2023-03-30 VITALS
HEART RATE: 72 BPM | BODY MASS INDEX: 22.75 KG/M2 | SYSTOLIC BLOOD PRESSURE: 113 MMHG | DIASTOLIC BLOOD PRESSURE: 73 MMHG | OXYGEN SATURATION: 97 % | WEIGHT: 118 LBS | TEMPERATURE: 97.7 F | RESPIRATION RATE: 16 BRPM

## 2023-03-30 DIAGNOSIS — Z23 NEED FOR SHINGLES VACCINE: ICD-10-CM

## 2023-03-30 DIAGNOSIS — G89.29 CHRONIC BILATERAL LOW BACK PAIN WITHOUT SCIATICA: ICD-10-CM

## 2023-03-30 DIAGNOSIS — R73.03 PREDIABETES: ICD-10-CM

## 2023-03-30 DIAGNOSIS — E78.2 MIXED HYPERLIPIDEMIA: ICD-10-CM

## 2023-03-30 DIAGNOSIS — F33.0 MILD EPISODE OF RECURRENT MAJOR DEPRESSIVE DISORDER (H): ICD-10-CM

## 2023-03-30 DIAGNOSIS — Z00.00 ROUTINE GENERAL MEDICAL EXAMINATION AT A HEALTH CARE FACILITY: Primary | ICD-10-CM

## 2023-03-30 DIAGNOSIS — M54.50 CHRONIC BILATERAL LOW BACK PAIN WITHOUT SCIATICA: ICD-10-CM

## 2023-03-30 DIAGNOSIS — R42 DIZZINESS: ICD-10-CM

## 2023-03-30 PROBLEM — F11.90 CHRONIC, CONTINUOUS USE OF OPIOIDS: Status: RESOLVED | Noted: 2022-12-27 | Resolved: 2023-03-30

## 2023-03-30 PROBLEM — D64.9 ANEMIA, UNSPECIFIED TYPE: Status: RESOLVED | Noted: 2022-10-06 | Resolved: 2023-03-30

## 2023-03-30 LAB
ERYTHROCYTE [DISTWIDTH] IN BLOOD BY AUTOMATED COUNT: 14.5 % (ref 10–15)
HBA1C MFR BLD: 5.9 % (ref 0–5.6)
HCT VFR BLD AUTO: 42.1 % (ref 40–53)
HGB BLD-MCNC: 13.4 G/DL (ref 13.3–17.7)
MCH RBC QN AUTO: 26.9 PG (ref 26.5–33)
MCHC RBC AUTO-ENTMCNC: 31.8 G/DL (ref 31.5–36.5)
MCV RBC AUTO: 85 FL (ref 78–100)
PLATELET # BLD AUTO: 213 10E3/UL (ref 150–450)
RBC # BLD AUTO: 4.98 10E6/UL (ref 4.4–5.9)
TSH SERPL DL<=0.005 MIU/L-ACNC: 2.07 UIU/ML (ref 0.3–4.2)
WBC # BLD AUTO: 6.6 10E3/UL (ref 4–11)

## 2023-03-30 PROCEDURE — 84443 ASSAY THYROID STIM HORMONE: CPT | Performed by: STUDENT IN AN ORGANIZED HEALTH CARE EDUCATION/TRAINING PROGRAM

## 2023-03-30 PROCEDURE — 99397 PER PM REEVAL EST PAT 65+ YR: CPT | Performed by: STUDENT IN AN ORGANIZED HEALTH CARE EDUCATION/TRAINING PROGRAM

## 2023-03-30 PROCEDURE — 85027 COMPLETE CBC AUTOMATED: CPT | Performed by: STUDENT IN AN ORGANIZED HEALTH CARE EDUCATION/TRAINING PROGRAM

## 2023-03-30 PROCEDURE — 36415 COLL VENOUS BLD VENIPUNCTURE: CPT | Performed by: STUDENT IN AN ORGANIZED HEALTH CARE EDUCATION/TRAINING PROGRAM

## 2023-03-30 PROCEDURE — 99213 OFFICE O/P EST LOW 20 MIN: CPT | Mod: 25 | Performed by: STUDENT IN AN ORGANIZED HEALTH CARE EDUCATION/TRAINING PROGRAM

## 2023-03-30 PROCEDURE — 83036 HEMOGLOBIN GLYCOSYLATED A1C: CPT | Performed by: STUDENT IN AN ORGANIZED HEALTH CARE EDUCATION/TRAINING PROGRAM

## 2023-03-30 RX ORDER — ACETAMINOPHEN 500 MG
500-1000 TABLET ORAL EVERY 8 HOURS PRN
Qty: 90 TABLET | Refills: 3 | Status: SHIPPED | OUTPATIENT
Start: 2023-03-30 | End: 2024-04-01

## 2023-03-30 ASSESSMENT — ENCOUNTER SYMPTOMS
FEVER: 0
CONSTIPATION: 0
HEMATOCHEZIA: 0
EYE PAIN: 0
SHORTNESS OF BREATH: 1
DYSURIA: 0
ABDOMINAL PAIN: 0
JOINT SWELLING: 0
PALPITATIONS: 0
SORE THROAT: 0
ARTHRALGIAS: 1
COUGH: 0
FREQUENCY: 0
HEARTBURN: 0
NAUSEA: 0
MYALGIAS: 1
HEMATURIA: 0
CHILLS: 0
DIARRHEA: 0
HEADACHES: 0
NERVOUS/ANXIOUS: 0
PARESTHESIAS: 0
DIZZINESS: 1
WEAKNESS: 1

## 2023-03-30 ASSESSMENT — ACTIVITIES OF DAILY LIVING (ADL)
CURRENT_FUNCTION: MONEY MANAGEMENT REQUIRES ASSISTANCE
CURRENT_FUNCTION: MEDICATION ADMINISTRATION REQUIRES ASSISTANCE
CURRENT_FUNCTION: TRANSPORTATION REQUIRES ASSISTANCE
CURRENT_FUNCTION: SHOPPING REQUIRES ASSISTANCE
CURRENT_FUNCTION: BATHING REQUIRES ASSISTANCE
CURRENT_FUNCTION: LAUNDRY REQUIRES ASSISTANCE
CURRENT_FUNCTION: HOUSEWORK REQUIRES ASSISTANCE
CURRENT_FUNCTION: TELEPHONE REQUIRES ASSISTANCE
CURRENT_FUNCTION: PREPARING MEALS REQUIRES ASSISTANCE

## 2023-03-30 ASSESSMENT — PATIENT HEALTH QUESTIONNAIRE - PHQ9
SUM OF ALL RESPONSES TO PHQ QUESTIONS 1-9: 7
10. IF YOU CHECKED OFF ANY PROBLEMS, HOW DIFFICULT HAVE THESE PROBLEMS MADE IT FOR YOU TO DO YOUR WORK, TAKE CARE OF THINGS AT HOME, OR GET ALONG WITH OTHER PEOPLE: SOMEWHAT DIFFICULT
SUM OF ALL RESPONSES TO PHQ QUESTIONS 1-9: 7

## 2023-03-30 ASSESSMENT — ANXIETY QUESTIONNAIRES
2. NOT BEING ABLE TO STOP OR CONTROL WORRYING: NOT AT ALL
7. FEELING AFRAID AS IF SOMETHING AWFUL MIGHT HAPPEN: NOT AT ALL
3. WORRYING TOO MUCH ABOUT DIFFERENT THINGS: NOT AT ALL
GAD7 TOTAL SCORE: 1
4. TROUBLE RELAXING: NOT AT ALL
1. FEELING NERVOUS, ANXIOUS, OR ON EDGE: NOT AT ALL
6. BECOMING EASILY ANNOYED OR IRRITABLE: SEVERAL DAYS
GAD7 TOTAL SCORE: 1
5. BEING SO RESTLESS THAT IT IS HARD TO SIT STILL: NOT AT ALL
7. FEELING AFRAID AS IF SOMETHING AWFUL MIGHT HAPPEN: NOT AT ALL
GAD7 TOTAL SCORE: 1
IF YOU CHECKED OFF ANY PROBLEMS ON THIS QUESTIONNAIRE, HOW DIFFICULT HAVE THESE PROBLEMS MADE IT FOR YOU TO DO YOUR WORK, TAKE CARE OF THINGS AT HOME, OR GET ALONG WITH OTHER PEOPLE: NOT DIFFICULT AT ALL
8. IF YOU CHECKED OFF ANY PROBLEMS, HOW DIFFICULT HAVE THESE MADE IT FOR YOU TO DO YOUR WORK, TAKE CARE OF THINGS AT HOME, OR GET ALONG WITH OTHER PEOPLE?: NOT DIFFICULT AT ALL

## 2023-03-30 NOTE — PROGRESS NOTES
"SUBJECTIVE:   Teodoro is a 83 year old who presents for Preventive Visit.  Additional Questions 3/30/2023   Roomed by    Accompanied by care giver     Patient has been advised of split billing requirements and indicates understanding: Yes  Are you in the first 12 months of your Medicare coverage?  No    Healthy Habits:     In general, how would you rate your overall health?  Fair    Frequency of exercise:  4-5 days/week    Duration of exercise:  Less than 15 minutes    Do you usually eat at least 4 servings of fruit and vegetables a day, include whole grains    & fiber and avoid regularly eating high fat or \"junk\" foods?  Yes    Taking medications regularly:  Yes    Medication side effects:  None    Ability to successfully perform activities of daily living:  Telephone requires assistance, transportation requires assistance, shopping requires assistance, preparing meals requires assistance, housework requires assistance, bathing requires assistance, laundry requires assistance, medication administration requires assistance and money management requires assistance    Home Safety:  No safety concerns identified    Hearing Impairment:  Difficulty following a conversation in a noisy restaurant or crowded room, feel that people are mumbling or not speaking clearly, difficulty following dialogue in the theater, difficult to understand a speaker at a public meeting or Sikh service, need to ask people to speak up or repeat themselves, find that men's voices are easier to understand than woman's, difficulty understanding soft or whispered speech and difficulty understanding speech on the telephone    In the past 6 months, have you been bothered by leaking of urine?  No    In general, how would you rate your overall mental or emotional health?  Fair      PHQ-2 Total Score: 0    Additional concerns today:  No    Dizzy - feels both like spinning and lightheadedness  Tired  Triggers- none known   Has meclizine, helps a " bit  Never done vestibular therapy   Get sweaty - have to give him air and neck feels heavy.   Will usually last a whole day or 2.   No headache.   Happens once a month  Denies LOC, focal weakness, shaking, CP, or palpitations.   Fell once last year - due to tripping. Has cane and walker.   Declined PT  No vision concerns  Feels that hearing isn't good, but he was tested before and didn't like hearing aids.         Have you ever done Advance Care Planning? (For example, a Health Directive, POLST, or a discussion with a medical provider or your loved ones about your wishes): No, advance care planning information given to patient to review.  Patient declined advance care planning discussion at this time.      Fall risk  Fallen 2 or more times in the past year?: No  Any fall with injury in the past year?: No  click delete button to remove this line now  Cognitive Screening   1) Repeat 3 items (Leader, Season, Table)    2) Clock draw: unable  3) 3 item recall: Recalls NO objects   Results: unable    Mini-CogTM Copyright ALFREDO Santoyo. Licensed by the author for use in [facility]; reprinted with permission (soob@[facility]). All rights reserved.      Do you have sleep apnea, excessive snoring or daytime drowsiness?: no    Reviewed and updated as needed this visit by clinical staff   Tobacco  Allergies               Reviewed and updated as needed this visit by Provider                 Social History     Tobacco Use     Smoking status: Former     Smokeless tobacco: Current     Types: Chew     Tobacco comments:     betel nut   Substance Use Topics     Alcohol use: No       Alcohol Use 3/30/2023   Prescreen: >3 drinks/day or >7 drinks/week? No       Current providers sharing in care for this patient include:   Patient Care Team:  Tricia Cohn MD as PCP - General (Family Medicine)  Merary Montes DO as Assigned PCP  Roxane Kruse RN Care Coordinator [employer] as Registered Nurse    The following health maintenance  items are reviewed in Epic and correct as of today:  Health Maintenance   Topic Date Due     DEPRESSION ACTION PLAN  Never done     ZOSTER IMMUNIZATION (1 of 2) Never done     PHQ-9  09/30/2023     URINE DRUG SCREEN  01/05/2024     MEDICARE ANNUAL WELLNESS VISIT  03/30/2024     ROMEO ASSESSMENT  03/30/2024     ANNUAL REVIEW OF HM ORDERS  03/30/2024     FALL RISK ASSESSMENT  03/30/2024     DTAP/TDAP/TD IMMUNIZATION (2 - Td or Tdap) 04/07/2026     ADVANCE CARE PLANNING  03/30/2028     INFLUENZA VACCINE  Completed     Pneumococcal Vaccine: 65+ Years  Completed     COVID-19 Vaccine  Completed     IPV IMMUNIZATION  Aged Out     MENINGITIS IMMUNIZATION  Aged Out     TREATMENT AGREEMENT FOR CHRONIC PAIN MANAGEMENT  Discontinued             Review of Systems   Constitutional: Negative for chills and fever.   HENT: Positive for hearing loss. Negative for congestion, ear pain and sore throat.    Eyes: Negative for pain and visual disturbance.   Respiratory: Positive for shortness of breath. Negative for cough.    Cardiovascular: Negative for chest pain, palpitations and peripheral edema.   Gastrointestinal: Negative for abdominal pain, constipation, diarrhea, heartburn, hematochezia and nausea.   Genitourinary: Negative for dysuria, frequency, genital sores, hematuria, impotence, penile discharge and urgency.   Musculoskeletal: Positive for arthralgias and myalgias. Negative for joint swelling.   Skin: Negative for rash.   Neurological: Positive for dizziness and weakness. Negative for headaches and paresthesias.   Psychiatric/Behavioral: Negative for mood changes. The patient is not nervous/anxious.          OBJECTIVE:   /73 (BP Location: Left arm, Patient Position: Sitting, Cuff Size: Adult Regular)   Pulse 72   Temp 97.7  F (36.5  C) (Temporal)   Resp 16   Wt 53.5 kg (118 lb)   SpO2 97%   BMI 22.75 kg/m   Estimated body mass index is 22.75 kg/m  as calculated from the following:    Height as of 3/9/22: 1.534 m  "(5' 0.39\").    Weight as of this encounter: 53.5 kg (118 lb).  Physical Exam  General Appearance:  Alert, cooperative, no distress, appears stated age.  Head:  Normocephalic, without obvious abnormality, atraumatic.  Eyes:  Conjunctivae/corneas clear, extraocular movements intact both eyes.  Lungs:  Clear to auscultation bilaterally, respirations unlabored.  Heart:  Regular rate and rhythm, S1 and S2 normal, no murmur, rub or gallop.  Abdomen:  Soft, non-tender, bowel sounds active all four quadrants.   Extremities:  Atraumatic, no cyanosis or edema.  Skin:  Skin color, texture, turgor normal, no rashes or lesions.  Neurologic: No focal deficits.      ASSESSMENT / PLAN:   Teodoro was seen today for physical.    Diagnoses and all orders for this visit:    Routine general medical examination at a health care facility  -     REVIEW OF HEALTH MAINTENANCE PROTOCOL ORDERS    Prediabetes  Recheck today.  -     Hemoglobin A1c; Future  -     Hemoglobin A1c    Mixed hyperlipidemia  Chronic, stable.  Lifestyle modifications, including: Increasing intake of vegetables and fruits, decreasing intake of processed foods/carbohydrates/sugars, having regular physical activity, and losing weight.      Mild episode of recurrent major depressive disorder (H)  Chronic, stable.    - Continue current management.    Chronic bilateral low back pain without sciatica  Chronic.  Previously being treated with Tylenol 3, however PCA reports that regular Tylenol appears to control pain as well.  Discontinue patient's Tylenol 3 and switch to high-dose Tylenol.  -     acetaminophen (TYLENOL) 500 MG tablet; Take 1-2 tablets (500-1,000 mg) by mouth every 8 hours as needed for mild pain    Need for shingles vaccine  We will check on insurance coverage    Dizziness  Symptoms occur 1-2 times per month.  Reports that dizziness feels both as room spinning and lightheadedness associated with sweating.  Given patient's history of anemia, will recheck CBC.  We " will also check TSH given his complaint of sweating during these episodes.  Meclizine seems to help, will continue this.  -     CBC with platelets; Future  -     TSH with free T4 reflex; Future  -     CBC with platelets  -     TSH with free T4 reflex  -Orthostatic vitals normal  -Declined PT for vestibular therapy        Patient has been advised of split billing requirements and indicates understanding: Yes      COUNSELING:  Reviewed preventive health counseling, as reflected in patient instructions       Regular exercise       Healthy diet/nutrition       Vision screening       Hearing screening       Dental care Answers for HPI/ROS submitted by the patient on 3/30/2023  If you checked off any problems, how difficult have these problems made it for you to do your work, take care of things at home, or get along with other people?: Somewhat difficult  PHQ9 TOTAL SCORE: 7  ROMEO 7 TOTAL SCORE: 1            He reports that he has quit smoking. His smokeless tobacco use includes chew.      Appropriate preventive services were discussed with this patient, including applicable screening as appropriate for cardiovascular disease, diabetes, osteopenia/osteoporosis, and glaucoma.  As appropriate for age/gender, discussed screening for colorectal cancer, prostate cancer, breast cancer, and cervical cancer. Checklist reviewing preventive services available has been given to the patient.    Reviewed patients plan of care and provided an AVS. The Intermediate Care Plan ( asthma action plan, low back pain action plan, and migraine action plan) for Teodoro meets the Care Plan requirement. This Care Plan has been established and reviewed with the Patient and caregiver.          Tricia Cohn MD  [facility]    Identified Health Risks:    I have reviewed Opioid Use Disorder and Substance Use Disorder risk factors and made any needed referrals.

## 2023-03-30 NOTE — PATIENT INSTRUCTIONS
Preventive Health Recommendations:     See your health care provider every year to    Review health changes.     Discuss preventive care.      Review your medicines if your doctor has prescribed any.      Talk with your health care provider about whether you should have a test to screen for prostate cancer (PSA).    Every 3 years, have a diabetes test (fasting glucose). If you are at risk for diabetes, you should have this test more often.    Every 5 years, have a cholesterol test. Have this test more often if you are at risk for high cholesterol or heart disease.     Every 10 years, have a colonoscopy. Or, have a yearly FIT test (stool test). These exams will check for colon cancer.    Talk to with your health care provider about screening for Abdominal Aortic Aneurysm if you have a family history of AAA or have a history of smoking.    Shots:     Get a flu shot each year.     Get a tetanus shot every 10 years.     Talk to your doctor about your pneumonia vaccines. There are now two you should receive - Pneumovax (PPSV 23) and Prevnar (PCV 13).     Talk to your pharmacist about a shingles vaccine.     Talk to your doctor about the hepatitis B vaccine.  Nutrition:     Eat at least 5 servings of fruits and vegetables each day.     Eat whole-grain bread, whole-wheat pasta and brown rice instead of white grains and rice.     Get adequate Calcium and Vitamin D.   Lifestyle    Exercise for at least 150 minutes a week (30 minutes a day, 5 days a week). This will help you control your weight and prevent disease.     Limit alcohol to one drink per day.     No smoking.     Wear sunscreen to prevent skin cancer.    See your dentist every six months for an exam and cleaning.    See your eye doctor every 1 to 2 years to screen for conditions such as glaucoma, macular degeneration, cataracts, etc.    Personalized Prevention Plan  You are due for the preventive services outlined below.  Your care team is available to assist you  in scheduling these services.  If you have already completed any of these items, please share that information with your care team to update in your medical record.  Health Maintenance Due   Topic Date Due     Depression Action Plan  Never done     TREATMENT AGREEMENT FOR CHRONIC PAIN MANAGEMENT  Never done     Zoster (Shingles) Vaccine (1 of 2) Never done     ANNUAL REVIEW OF HM ORDERS  10/06/2022

## 2023-03-30 NOTE — LETTER
[facility] ([facility]) Contract: Opioid Prescription  I understand that my provider, ____________________________, is prescribing an opioid medication to assist me in managing my chronic pain and assist me in improving my daily function and activity level. If my activity level or general function changes, the medication may be changed or discontinued. I understand that my provider is not required to prescribe this medication. The risks, side effects, and benefits of taking this medication have been explained to me and I agree to the following conditions related to this treatment. Failure to adhere to these conditions will result in discontinuation of this medication and possible termination of care from the [facility].     I will take my medication as prescribed. I will not change the amount or frequency of the medication without provider approval.    I will only receive these medications from _____________________________________________ (or my provider s designee as determined by my provider).    I am being prescribed opioid medications to treat the following condition(s):________________________________________________.    If I am hospitalized or seen in an emergency department or urgent care for a condition that results in a prescription for another controlled substance (such as anti-anxiety, additional pain medication, sleep aid, or stimulants), I will inform the clinic within one business day of the additional medication.    I will notify the clinic within one business day if I seek any care elsewhere related to this medication.    I will participate in all additional treatments that my provider recommends, such as physical therapy or consultations with a pain or mental health specialist.     I will notify my provider of any history of addiction or current chemical dependence.    I will not use illegal drugs (includes marijuana).     I will comply with random drug screening to confirm  proper use of my medication.    I will comply with state law. I understand that I may not be able to operate a motor vehicle while taking these medications. I will not participate in any activities that will put myself or others at physical risk while taking any medications.    I will not give, sell, or trade my medications to anyone else.      I will not take someone else s medications.    I will not forge or change my prescriptions in any way.    I understand I will have one clinic appointment every __________months (or more frequently) as determined by my provider.    I understand it is my responsibility to schedule future appointments with my provider at the end of each visit.    I will not request early refills. I understand that lost and/or stolen prescriptions/medications will not be replaced.    I will request refills of these medications in the following manner:    o I am responsible to keep track of my medications and plan ahead to arrange for refills. It is my responsibility to ensure that I do not run out of medication.  o I will call [phone] and request a refill of my opioid medication(s).  All other medication refill request should be requested through your pharmacy.  o I will give the clinic one full week notice prior to needing a refill.  o I will not walk in or call into the clinic and request this medication to be refilled same day (I understand I must give a full week notice of the need for refill).  o I understand that these prescriptions will be dispensed monthly with a maximum of one-month supply with no refills.  o Prescriptions will only be available to be picked up during regular office hours (7:30am-5:00pm Monday-Friday). Refills will not be provided at night, on weekends, or during holidays.  I will treat my provider and clinic staff with respect. I will not yell, name-call, shout, or use offensive or vulgar language. I will not threaten or act in an intimidating manner on the  telephone or while in the clinic toward my provider or clinic staff.  I agree if I break this agreement, my physician reserves the right to stop prescribing the controlled substance for me - my medications will be discontinued in a medically safe fashion, and I will not be allowed to get pain medications from any other provider in this clinic and/or it may result in a termination of care from this clinic.       I understand this contract is in effect for all current and future opioid prescriptions received through the Primary Care Center.          _________________________________________________________________________                _________________  Signature of Patient or Personal Representative (state relationship)                                        Date    _________________________________________________________________________                 ________________                                              Signature of Provider (also please print name)                                                                               Date

## 2023-03-31 ENCOUNTER — TELEPHONE (OUTPATIENT)
Dept: FAMILY MEDICINE | Facility: CLINIC | Age: 83
End: 2023-03-31
Payer: COMMERCIAL

## 2023-03-31 NOTE — TELEPHONE ENCOUNTER
----- Message from Tricia Cohn MD sent at 3/31/2023  9:52 AM CDT -----  Thyroid hormone level is normal

## 2023-03-31 NOTE — TELEPHONE ENCOUNTER
----- Message from Tricia Cohn MD sent at 3/30/2023  5:10 PM CDT -----  Prediabetes stable.    Blood counts did not show any signs of anemia or infection.

## 2023-07-15 ENCOUNTER — NURSE TRIAGE (OUTPATIENT)
Dept: FAMILY MEDICINE | Facility: CLINIC | Age: 83
End: 2023-07-15
Payer: COMMERCIAL

## 2023-07-15 DIAGNOSIS — R42 DIZZINESS: ICD-10-CM

## 2023-07-15 RX ORDER — MECLIZINE HYDROCHLORIDE 25 MG/1
25 TABLET ORAL 3 TIMES DAILY PRN
Qty: 90 TABLET | Refills: 2 | Status: SHIPPED | OUTPATIENT
Start: 2023-07-15 | End: 2023-11-09

## 2023-07-15 NOTE — TELEPHONE ENCOUNTER
Reason for Disposition    [1] Caller requesting a prescription renewal (no refills left), no triage required, AND [2] triager able to renew prescription per department policy    Protocols used: MEDICATION REFILL AND RENEWAL CALL-A-AH

## 2023-07-15 NOTE — TELEPHONE ENCOUNTER
"    Last Written Prescription Date:  01/05/2023  Last Fill Quantity: 90,  # refills: 3   Last office visit provider:  03/30/2023     Requested Prescriptions   Pending Prescriptions Disp Refills     meclizine (ANTIVERT) 25 MG tablet [Pharmacy Med Name: MECLIZINE HCL 25 MG TABS 25 Tablet] 90 tablet 3     Sig: TAKE 1 TABLET (25 MG) BY MOUTH 3 TIMES DAILY AS NEEDED FOR DIZZINESS        Antivertigo/Antiemetic Agents Passed - 7/15/2023  2:06 PM        Passed - Recent (12 mo) or future (30 days) visit within the authorizing provider's specialty     Patient has had an office visit with the authorizing provider or a provider within the authorizing providers department within the previous 12 mos or has a future within next 30 days. See \"Patient Info\" tab in inbasket, or \"Choose Columns\" in Meds & Orders section of the refill encounter.              Passed - Medication is active on med list        Passed - Patient is 18 years of age or older             Barbi Ng RN 07/15/23 2:06 PM  "

## 2023-11-09 ENCOUNTER — OFFICE VISIT (OUTPATIENT)
Dept: FAMILY MEDICINE | Facility: CLINIC | Age: 83
End: 2023-11-09
Payer: COMMERCIAL

## 2023-11-09 VITALS
HEIGHT: 60 IN | TEMPERATURE: 98.6 F | OXYGEN SATURATION: 96 % | WEIGHT: 116 LBS | BODY MASS INDEX: 22.78 KG/M2 | SYSTOLIC BLOOD PRESSURE: 112 MMHG | HEART RATE: 71 BPM | RESPIRATION RATE: 18 BRPM | DIASTOLIC BLOOD PRESSURE: 73 MMHG

## 2023-11-09 DIAGNOSIS — R26.9 IMPAIRED GAIT: ICD-10-CM

## 2023-11-09 DIAGNOSIS — Z23 NEEDS FLU SHOT: ICD-10-CM

## 2023-11-09 DIAGNOSIS — R42 DIZZINESS: Primary | ICD-10-CM

## 2023-11-09 DIAGNOSIS — Z29.11 NEED FOR RSV VACCINATION: ICD-10-CM

## 2023-11-09 DIAGNOSIS — F51.01 PRIMARY INSOMNIA: ICD-10-CM

## 2023-11-09 DIAGNOSIS — Z23 NEED FOR SHINGLES VACCINE: ICD-10-CM

## 2023-11-09 DIAGNOSIS — H04.123 DRY EYES: ICD-10-CM

## 2023-11-09 DIAGNOSIS — Z23 NEED FOR COVID-19 VACCINE: ICD-10-CM

## 2023-11-09 PROCEDURE — 91320 SARSCV2 VAC 30MCG TRS-SUC IM: CPT | Performed by: STUDENT IN AN ORGANIZED HEALTH CARE EDUCATION/TRAINING PROGRAM

## 2023-11-09 PROCEDURE — 90662 IIV NO PRSV INCREASED AG IM: CPT | Performed by: STUDENT IN AN ORGANIZED HEALTH CARE EDUCATION/TRAINING PROGRAM

## 2023-11-09 PROCEDURE — 90480 ADMN SARSCOV2 VAC 1/ONLY CMP: CPT | Performed by: STUDENT IN AN ORGANIZED HEALTH CARE EDUCATION/TRAINING PROGRAM

## 2023-11-09 PROCEDURE — 90471 IMMUNIZATION ADMIN: CPT | Performed by: STUDENT IN AN ORGANIZED HEALTH CARE EDUCATION/TRAINING PROGRAM

## 2023-11-09 PROCEDURE — 99213 OFFICE O/P EST LOW 20 MIN: CPT | Mod: 25 | Performed by: STUDENT IN AN ORGANIZED HEALTH CARE EDUCATION/TRAINING PROGRAM

## 2023-11-09 RX ORDER — MECLIZINE HYDROCHLORIDE 25 MG/1
25 TABLET ORAL 3 TIMES DAILY PRN
Qty: 90 TABLET | Refills: 2 | Status: SHIPPED | OUTPATIENT
Start: 2023-11-09 | End: 2024-07-22

## 2023-11-09 RX ORDER — OLOPATADINE HYDROCHLORIDE 1 MG/ML
1 SOLUTION/ DROPS OPHTHALMIC 2 TIMES DAILY
Qty: 10 ML | Refills: 1 | Status: SHIPPED | OUTPATIENT
Start: 2023-11-09 | End: 2024-04-01

## 2023-11-09 RX ORDER — RAMELTEON 8 MG/1
8 TABLET ORAL AT BEDTIME
Qty: 30 TABLET | Refills: 11 | Status: SHIPPED | OUTPATIENT
Start: 2023-11-09 | End: 2024-04-01

## 2023-11-09 ASSESSMENT — PATIENT HEALTH QUESTIONNAIRE - PHQ9
SUM OF ALL RESPONSES TO PHQ QUESTIONS 1-9: 4
10. IF YOU CHECKED OFF ANY PROBLEMS, HOW DIFFICULT HAVE THESE PROBLEMS MADE IT FOR YOU TO DO YOUR WORK, TAKE CARE OF THINGS AT HOME, OR GET ALONG WITH OTHER PEOPLE: SOMEWHAT DIFFICULT
SUM OF ALL RESPONSES TO PHQ QUESTIONS 1-9: 4

## 2023-11-09 NOTE — PROGRESS NOTES
Assessment & Plan     Dizziness  Chronic, suspect vertigo, Declined referral to   - meclizine (ANTIVERT) 25 MG tablet  Dispense: 90 tablet; Refill: 2    Impaired gait  Chronic, uses cane.  Needs a new 1.  - Cane Order for DME - ONLY FOR DME    Primary insomnia  Chronic, well controlled with ramelteon.  Continue.  - ramelteon (ROZEREM) 8 MG tablet  Dispense: 30 tablet; Refill: 11    Needs flu shot  - INFLUENZA VACCINE 65+ (FLUZONE HD)    Need for COVID-19 vaccine  - COVID-19 12+ (2023-24) (PFIZER)    Need for shingles vaccine  Need for RSV vaccination  To get at pharmacy    Dry eyes  Chronic, well controlled with Patanol as needed.  Refilled.  - olopatadine (PATANOL) 0.1 % ophthalmic solution  Dispense: 10 mL; Refill: 1      Tricia Cohn MD  [facility]    Ivana Valerio is a 83 year old, presenting for the following health issues:  office visit (Follow up, need new prescription for eye drop, the current one is not working. Need new cane)        11/9/2023     2:18 PM   Additional Questions   Roomed by    Accompanied by PCA       History of Present Illness       Reason for visit:  6 month follow up    He eats 2-3 servings of fruits and vegetables daily.He consumes 0 sweetened beverage(s) daily.He exercises with enough effort to increase his heart rate 9 or less minutes per day.  He exercises with enough effort to increase his heart rate 3 or less days per week.   He is taking medications regularly.       Meclizine working  Eyes with intermittently, patanol helping  Neck pain, no trauma, fall or injury. No weakness or tingling of arms and hands.   Tylenol doesn't help as much as ibuprofen.     Review of Systems   Constitutional: Negative for fever and chills.   Respiratory: Negative for cough or shortness of breath.    Cardiovascular: Negative for chest pain or chest pressure.   Gastrointestinal: Negative for nausea, vomiting, diarrhea or abdominal pain.        Objective    /73 (BP  "Location: Left arm, Patient Position: Sitting, Cuff Size: Adult Regular)   Pulse 71   Temp 98.6  F (37  C) (Temporal)   Resp 18   Ht 1.534 m (5' 0.39\")   Wt 52.6 kg (116 lb)   SpO2 96%   BMI 22.36 kg/m    Body mass index is 22.36 kg/m .  Physical Exam   General: Well developed, well nourished.  Skin:  Dry without rash.    Head:  Normocephalic-atraumatic.    Eye:  Normal conjunctivae.     Respiratory:  Normal respiratory effort.   Gastrointestinal:  Non-distended.    Neck: Tenderness to the spine or spinal muscles.  Normal range of motion.  No deformity or edema.  Neurologic: No focal deficits.          Prior to immunization administration, verified patients identity using patient s name and date of birth. Please see Immunization Activity for additional information.     Screening Questionnaire for Adult Immunization    Are you sick today?   No   Do you have allergies to medications, food, a vaccine component or latex?   No   Have you ever had a serious reaction after receiving a vaccination?   No   Do you have a long-term health problem with heart, lung, kidney, or metabolic disease (e.g., diabetes), asthma, a blood disorder, no spleen, complement component deficiency, a cochlear implant, or a spinal fluid leak?  Are you on long-term aspirin therapy?   No   Do you have cancer, leukemia, HIV/AIDS, or any other immune system problem?   No   Do you have a parent, brother, or sister with an immune system problem?   No   In the past 3 months, have you taken medications that affect  your immune system, such as prednisone, other steroids, or anticancer drugs; drugs for the treatment of rheumatoid arthritis, Crohn s disease, or psoriasis; or have you had radiation treatments?   No   Have you had a seizure, or a brain or other nervous system problem?   No   During the past year, have you received a transfusion of blood or blood    products, or been given immune (gamma) globulin or antiviral drug?   No   For women: Are " you pregnant or is there a chance you could become       pregnant during the next month?   No   Have you received any vaccinations in the past 4 weeks?   No     Immunization questionnaire answers were all negative.      Patient instructed to remain in clinic for 15 minutes afterwards, and to report any adverse reactions.     Screening performed by Lev Boo MA on 11/9/2023 at 2:24 PM.

## 2024-01-05 DIAGNOSIS — F32.0 MILD SINGLE CURRENT EPISODE OF MAJOR DEPRESSIVE DISORDER (H): ICD-10-CM

## 2024-01-05 DIAGNOSIS — R63.0 POOR APPETITE: ICD-10-CM

## 2024-01-05 DIAGNOSIS — R42 DIZZINESS: ICD-10-CM

## 2024-01-05 RX ORDER — MIRTAZAPINE 15 MG/1
15 TABLET, FILM COATED ORAL AT BEDTIME
Qty: 30 TABLET | Refills: 3 | OUTPATIENT
Start: 2024-01-05

## 2024-01-05 RX ORDER — MULTIVITAMIN
1 TABLET ORAL DAILY
Qty: 30 TABLET | Refills: 3 | OUTPATIENT
Start: 2024-01-05

## 2024-02-06 DIAGNOSIS — R63.0 POOR APPETITE: ICD-10-CM

## 2024-02-06 DIAGNOSIS — R42 DIZZINESS: ICD-10-CM

## 2024-02-06 DIAGNOSIS — F32.0 MILD SINGLE CURRENT EPISODE OF MAJOR DEPRESSIVE DISORDER (H): ICD-10-CM

## 2024-02-07 RX ORDER — MIRTAZAPINE 15 MG/1
15 TABLET, FILM COATED ORAL AT BEDTIME
Qty: 30 TABLET | Refills: 0 | Status: SHIPPED | OUTPATIENT
Start: 2024-02-07 | End: 2024-04-01

## 2024-02-15 ENCOUNTER — TRANSFERRED RECORDS (OUTPATIENT)
Dept: HEALTH INFORMATION MANAGEMENT | Facility: CLINIC | Age: 84
End: 2024-02-15

## 2024-03-17 NOTE — PROGRESS NOTES
Clinic Care Coordination Contact  Care Team Conversations     CCC SW attempted to reach patient's PCA twice, no answer.      
KELLY  Spoke to Tracy today 12-4-20  She will be calling [facility] to support them  with the citizenship application.  I informed Tracy that they  the completed medical waiver forms on 11-25-20 at Dr. Cortez's office and formerly [facility] mailed the wife's medical waiver form to [facility] in [place] and 's medical waiver [facility] sent [facility] [place].  [facility] have the wife's case transfer to her since she has the  so it is not confusing.  Aun  
VTE Present on Admission, Assessment Completed on: 17-Mar-2024 06:37

## 2024-04-01 ENCOUNTER — OFFICE VISIT (OUTPATIENT)
Dept: FAMILY MEDICINE | Facility: CLINIC | Age: 84
End: 2024-04-01
Payer: COMMERCIAL

## 2024-04-01 ENCOUNTER — ANCILLARY PROCEDURE (OUTPATIENT)
Dept: GENERAL RADIOLOGY | Facility: CLINIC | Age: 84
End: 2024-04-01
Attending: FAMILY MEDICINE
Payer: COMMERCIAL

## 2024-04-01 VITALS
RESPIRATION RATE: 20 BRPM | BODY MASS INDEX: 23.36 KG/M2 | HEART RATE: 60 BPM | TEMPERATURE: 97.7 F | HEIGHT: 60 IN | OXYGEN SATURATION: 96 % | SYSTOLIC BLOOD PRESSURE: 104 MMHG | WEIGHT: 119 LBS | DIASTOLIC BLOOD PRESSURE: 69 MMHG

## 2024-04-01 DIAGNOSIS — F51.01 PRIMARY INSOMNIA: ICD-10-CM

## 2024-04-01 DIAGNOSIS — H04.123 DRY EYES: ICD-10-CM
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
## 2024-04-01 DIAGNOSIS — Z00.00 HEALTHCARE MAINTENANCE: ICD-10-CM

## 2024-04-01 DIAGNOSIS — E78.2 MIXED HYPERLIPIDEMIA: ICD-10-CM

## 2024-04-01 DIAGNOSIS — F32.0 MILD SINGLE CURRENT EPISODE OF MAJOR DEPRESSIVE DISORDER (H): ICD-10-CM

## 2024-04-01 DIAGNOSIS — M54.2 NECK PAIN: Primary | ICD-10-CM

## 2024-04-01 DIAGNOSIS — M54.2 NECK PAIN: ICD-10-CM

## 2024-04-01 DIAGNOSIS — R53.82 CHRONIC FATIGUE: ICD-10-CM

## 2024-04-01 DIAGNOSIS — R42 DIZZINESS: ICD-10-CM

## 2024-04-01 DIAGNOSIS — R73.03 PREDIABETES: ICD-10-CM

## 2024-04-01 DIAGNOSIS — T17.928A ASPIRATION OF FOOD, INITIAL ENCOUNTER: ICD-10-CM

## 2024-04-01 DIAGNOSIS — R63.0 POOR APPETITE: ICD-10-CM

## 2024-04-01 DIAGNOSIS — M54.50 CHRONIC BILATERAL LOW BACK PAIN WITHOUT SCIATICA: ICD-10-CM

## 2024-04-01 DIAGNOSIS — G89.29 CHRONIC BILATERAL LOW BACK PAIN WITHOUT SCIATICA: ICD-10-CM

## 2024-04-01 LAB
BASOPHILS # BLD AUTO: 0 10E3/UL (ref 0–0.2)
BASOPHILS NFR BLD AUTO: 1 %
EOSINOPHIL # BLD AUTO: 0.4 10E3/UL (ref 0–0.7)
EOSINOPHIL NFR BLD AUTO: 6 %
ERYTHROCYTE [DISTWIDTH] IN BLOOD BY AUTOMATED COUNT: 14.8 % (ref 10–15)
HBA1C MFR BLD: 5.7 % (ref 0–5.6)
HCT VFR BLD AUTO: 42.5 % (ref 40–53)
HGB BLD-MCNC: 13.6 G/DL (ref 13.3–17.7)
IMM GRANULOCYTES # BLD: 0 10E3/UL
IMM GRANULOCYTES NFR BLD: 0 %
LYMPHOCYTES # BLD AUTO: 1.7 10E3/UL (ref 0.8–5.3)
LYMPHOCYTES NFR BLD AUTO: 27 %
MCH RBC QN AUTO: 26.8 PG (ref 26.5–33)
MCHC RBC AUTO-ENTMCNC: 32 G/DL (ref 31.5–36.5)
MCV RBC AUTO: 84 FL (ref 78–100)
MONOCYTES # BLD AUTO: 0.7 10E3/UL (ref 0–1.3)
MONOCYTES NFR BLD AUTO: 11 %
NEUTROPHILS # BLD AUTO: 3.7 10E3/UL (ref 1.6–8.3)
NEUTROPHILS NFR BLD AUTO: 56 %
PLATELET # BLD AUTO: 198 10E3/UL (ref 150–450)
RBC # BLD AUTO: 5.07 10E6/UL (ref 4.4–5.9)
WBC # BLD AUTO: 6.6 10E3/UL (ref 4–11)

## 2024-04-01 PROCEDURE — 36415 COLL VENOUS BLD VENIPUNCTURE: CPT | Performed by: FAMILY MEDICINE

## 2024-04-01 PROCEDURE — 71046 X-RAY EXAM CHEST 2 VIEWS: CPT | Mod: TC | Performed by: RADIOLOGY

## 2024-04-01 PROCEDURE — 96127 BRIEF EMOTIONAL/BEHAV ASSMT: CPT | Performed by: FAMILY MEDICINE

## 2024-04-01 PROCEDURE — 83036 HEMOGLOBIN GLYCOSYLATED A1C: CPT | Performed by: FAMILY MEDICINE

## 2024-04-01 PROCEDURE — 72040 X-RAY EXAM NECK SPINE 2-3 VW: CPT | Mod: TC | Performed by: RADIOLOGY

## 2024-04-01 PROCEDURE — 80053 COMPREHEN METABOLIC PANEL: CPT | Performed by: FAMILY MEDICINE

## 2024-04-01 PROCEDURE — 85025 COMPLETE CBC W/AUTO DIFF WBC: CPT | Performed by: FAMILY MEDICINE

## 2024-04-01 PROCEDURE — 99214 OFFICE O/P EST MOD 30 MIN: CPT | Performed by: FAMILY MEDICINE

## 2024-04-01 PROCEDURE — 84443 ASSAY THYROID STIM HORMONE: CPT | Performed by: FAMILY MEDICINE

## 2024-04-01 RX ORDER — MIRTAZAPINE 15 MG/1
15 TABLET, FILM COATED ORAL AT BEDTIME
Qty: 30 TABLET | Refills: 3 | Status: SHIPPED | OUTPATIENT
Start: 2024-04-01 | End: 2024-07-22

## 2024-04-01 RX ORDER — RAMELTEON 8 MG/1
8 TABLET ORAL AT BEDTIME
Qty: 30 TABLET | Refills: 0 | Status: SHIPPED | OUTPATIENT
Start: 2024-04-01 | End: 2024-04-22

## 2024-04-01 RX ORDER — MULTIVITAMIN
1 TABLET ORAL DAILY
Qty: 30 TABLET | Refills: 11 | Status: SHIPPED | OUTPATIENT
Start: 2024-04-01

## 2024-04-01 RX ORDER — ACETAMINOPHEN 500 MG
500-1000 TABLET ORAL EVERY 8 HOURS PRN
Qty: 90 TABLET | Refills: 3 | Status: SHIPPED | OUTPATIENT
Start: 2024-04-01

## 2024-04-01 RX ORDER — OLOPATADINE HYDROCHLORIDE 1 MG/ML
1 SOLUTION/ DROPS OPHTHALMIC 2 TIMES DAILY
Qty: 10 ML | Refills: 1 | Status: SHIPPED | OUTPATIENT
Start: 2024-04-01

## 2024-04-01 ASSESSMENT — ANXIETY QUESTIONNAIRES
6. BECOMING EASILY ANNOYED OR IRRITABLE: SEVERAL DAYS
2. NOT BEING ABLE TO STOP OR CONTROL WORRYING: NOT AT ALL
7. FEELING AFRAID AS IF SOMETHING AWFUL MIGHT HAPPEN: NOT AT ALL
8. IF YOU CHECKED OFF ANY PROBLEMS, HOW DIFFICULT HAVE THESE MADE IT FOR YOU TO DO YOUR WORK, TAKE CARE OF THINGS AT HOME, OR GET ALONG WITH OTHER PEOPLE?: NOT DIFFICULT AT ALL
5. BEING SO RESTLESS THAT IT IS HARD TO SIT STILL: NOT AT ALL
4. TROUBLE RELAXING: NOT AT ALL
1. FEELING NERVOUS, ANXIOUS, OR ON EDGE: SEVERAL DAYS
7. FEELING AFRAID AS IF SOMETHING AWFUL MIGHT HAPPEN: NOT AT ALL
5. BEING SO RESTLESS THAT IT IS HARD TO SIT STILL: NOT AT ALL
IF YOU CHECKED OFF ANY PROBLEMS ON THIS QUESTIONNAIRE, HOW DIFFICULT HAVE THESE PROBLEMS MADE IT FOR YOU TO DO YOUR WORK, TAKE CARE OF THINGS AT HOME, OR GET ALONG WITH OTHER PEOPLE: NOT DIFFICULT AT ALL
GAD7 TOTAL SCORE: 3
IF YOU CHECKED OFF ANY PROBLEMS ON THIS QUESTIONNAIRE, HOW DIFFICULT HAVE THESE PROBLEMS MADE IT FOR YOU TO DO YOUR WORK, TAKE CARE OF THINGS AT HOME, OR GET ALONG WITH OTHER PEOPLE: NOT DIFFICULT AT ALL
GAD7 TOTAL SCORE: 3
1. FEELING NERVOUS, ANXIOUS, OR ON EDGE: SEVERAL DAYS
GAD7 TOTAL SCORE: 3
3. WORRYING TOO MUCH ABOUT DIFFERENT THINGS: SEVERAL DAYS
6. BECOMING EASILY ANNOYED OR IRRITABLE: SEVERAL DAYS
3. WORRYING TOO MUCH ABOUT DIFFERENT THINGS: SEVERAL DAYS
4. TROUBLE RELAXING: NOT AT ALL
2. NOT BEING ABLE TO STOP OR CONTROL WORRYING: NOT AT ALL
7. FEELING AFRAID AS IF SOMETHING AWFUL MIGHT HAPPEN: NOT AT ALL

## 2024-04-01 ASSESSMENT — ENCOUNTER SYMPTOMS
FATIGUE: 1
BACK PAIN: 1

## 2024-04-01 NOTE — PROGRESS NOTES
"  1. Chronic bilateral low back pain without sciatica  H/o chronic bilateral low back pain - will add Acetaminophen for pain   - acetaminophen (TYLENOL) 500 MG tablet; Take 1-2 tablets (500-1,000 mg) by mouth every 8 hours as needed for mild pain  Dispense: 90 tablet; Refill: 3    2. Neck pain  Patient now has more neck pain as well - will check xray of cervical spine - reviewed ; some degenerative disc disease noted in lower cervical spine -   - XR Cervical Spine 2/3 Views; Future    3. Aspiration of food, initial encounter  Patient suggests he has occasional aspiration of food - will check chest xray for signs of chronic aspiration - CXR ordered/reviewed - no infiltrate  - XR Chest 2 Views; Future    4. Poor appetite  85 yo male with poor appetite - will try Mirtazapine at bedtime   - mirtazapine (REMERON) 15 MG tablet; Take 1 tablet (15 mg) by mouth at bedtime  Dispense: 30 tablet; Refill: 3  - Multiple Vitamin (MULTI VITAMIN) TABS; Take 1 tablet by mouth daily  Dispense: 90 tablet; Refill: 3    5. 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 Dizziness  Patient complains of \"dizzyness\" - continue Mirtazapine -   - mirtazapine (REMERON) 15 MG tablet; Take 1 tablet (15 mg) by mouth at bedtime  Dispense: 30 tablet; Refill: 3    7. Dry eyes  Patient with dry eyes - likely allergic in nature - refill Patanol   - olopatadine (PATANOL) 0.1 % ophthalmic solution; Place 1 drop into both eyes 2 times daily  Dispense: 10 mL; Refill: 1    8. Primary insomnia  Patient uses Rozerem irregularly for insomnia/poor sleep - refilled today - discussed risks of taking this regularly -   - ramelteon (ROZEREM) 8 MG tablet; Take 1 tablet (8 mg) by mouth at bedtime  Dispense: 30 tablet; Refill: 0    9. Healthcare maintenance  Desires refill on " "Multivitamin   - multivitamin (ONE-DAILY) tablet; Take 1 tablet by mouth daily  Dispense: 30 tablet; Refill: 11    10. Prediabetes  H/o prediabetes - elevated A1c - recheck labs -   - Hemoglobin A1c; Future  - Comprehensive metabolic panel (BMP + Alb, Alk Phos, ALT, AST, Total. Bili, TP); Future  - Hemoglobin A1c  - Comprehensive metabolic panel (BMP + Alb, Alk Phos, ALT, AST, Total. Bili, TP)    11. Mixed hyperlipidemia  H/o elevated lipids - not on lipid lowering therapy - check labs -   - Comprehensive metabolic panel (BMP + Alb, Alk Phos, ALT, AST, Total. Bili, TP); Future  - Comprehensive metabolic panel (BMP + Alb, Alk Phos, ALT, AST, Total. Bili, TP)    12. Chronic fatigue  Will do labs to rule out metabolic causes - beyond already known diagnoses  - TSH; Future  - CBC with Platelets & Differential; Future  - TSH  - CBC with Platelets & Differential      Subjective   Teodoro is a 84 year old, presenting for the following health issues:  Neck Problem (Pt stated has sore neck for while now but it getting worse about a week now not comfortable laying down or sitting up, not sure what caused it), Fatigue (Pt stated started about month now notice, when exercising pt would wheeze like having asthma attack), Refill Request (Need refill on eye drops and Mirtazapine, Multi-vitamin), and Back Pain (Pt was giving tylenol but seem not working)        4/1/2024     1:33 PM   Additional Questions   Roomed by Estephania   Accompanied by PCA     Neck pain - sharp, knife-like  Last couple weeks, has gotten worse -   Makes it uncomfortable to sleep  No injury   Hurts in middle and radiates both way - in lower neck - like his neck is cut off   (Sits leaning to right) -   Sometimes \"feels better\" -   Sometimes gives medicine - ointments/massage -   Pain is present every day -     Brings in bottles for:  Acetaminophen 500 mg, 1-2 tabs q 8 hour prn -   Eye allergy itch and redness relief   Meclizine 25 mg TID prn   One Daily " "multivitamin   Mirtazapine 15 mg daily     When he eats food, always seems to cough   Hasn't had previous pneumonia  When goes on hike, has to stop in 10 minutes          History of Present Illness       Reason for visit:  Neck pain    He eats 2-3 servings of fruits and vegetables daily.He consumes 1 sweetened beverage(s) daily.He exercises with enough effort to increase his heart rate 10 to 19 minutes per day.  He exercises with enough effort to increase his heart rate 3 or less days per week.   He is taking medications regularly.         Review of Systems   Constitutional:  Positive for fatigue. Negative for chills and fever.   HENT:  Negative for ear pain and sore throat.    Eyes:  Negative for pain and visual disturbance.   Respiratory:  Negative for cough and shortness of breath.    Cardiovascular:  Negative for chest pain and palpitations.   Gastrointestinal:  Negative for abdominal pain and vomiting.   Genitourinary:  Negative for dysuria and hematuria.   Musculoskeletal:  Positive for back pain and neck pain. Negative for arthralgias.   Skin:  Negative for color change and rash.   Neurological:  Negative for seizures and syncope.   All other systems reviewed and are negative.          Objective    /69 (BP Location: Left arm, Patient Position: Sitting, Cuff Size: Adult Regular)   Pulse 60   Temp 97.7  F (36.5  C) (Temporal)   Resp 20   Ht 1.53 m (5' 0.24\")   Wt 54 kg (119 lb)   SpO2 96%   BMI 23.06 kg/m    Body mass index is 23.06 kg/m .  Physical Exam  Vitals reviewed.   Constitutional:       General: He is not in acute distress.     Appearance: Normal appearance.   HENT:      Head: Normocephalic.      Right Ear: Tympanic membrane, ear canal and external ear normal.      Left Ear: Tympanic membrane, ear canal and external ear normal.      Nose: Nose normal.      Mouth/Throat:      Mouth: Mucous membranes are moist.      Pharynx: No posterior oropharyngeal erythema.   Eyes:      Extraocular " Movements: Extraocular movements intact.      Conjunctiva/sclera: Conjunctivae normal.      Pupils: Pupils are equal, round, and reactive to light.   Neck:      Comments: Decreased ROM neck  Cardiovascular:      Rate and Rhythm: Normal rate and regular rhythm.      Pulses: Normal pulses.      Heart sounds: Normal heart sounds. No murmur heard.  Pulmonary:      Effort: Pulmonary effort is normal.      Breath sounds: Normal breath sounds.   Abdominal:      Palpations: Abdomen is soft. There is no mass.      Tenderness: There is no abdominal tenderness. There is no guarding or rebound.   Musculoskeletal:         General: Deformity (DJD - knees) present.   Lymphadenopathy:      Cervical: No cervical adenopathy.   Skin:     General: Skin is warm and dry.   Neurological:      General: No focal deficit present.      Mental Status: He is alert and oriented to person, place, and time.   Psychiatric:         Mood and Affect: Mood normal.         Behavior: Behavior normal.            Results for orders placed or performed in visit on 04/01/24   XR Chest 2 Views     Status: None    Narrative    EXAM: XR CHEST 2 VIEWS  LOCATION: [facility]  DATE: 4/1/2024    INDICATION:  Aspiration of food, initial encounter.  COMPARISON: 02/22/2016      Impression    IMPRESSION: Negative chest.   Results for orders placed or performed in visit on 04/01/24   XR Cervical Spine 2/3 Views     Status: None    Narrative    EXAM: XR CERVICAL SPINE 2/3 VIEWS  LOCATION: [facility]  DATE: 04/01/2024    INDICATION: Neck pain.  COMPARISON: None.      Impression    IMPRESSION: No fracture. Normal vertebral heights and alignment. Reversal of the normal cervical lordosis centered at C5. Moderate degenerative disc disease at C5-C7 and mild degenerative disc disease at C4-C5 with loss of disc height, marginal   osteophytic spurring about the disc spaces, and apposing endplate sclerosis. Moderate multilevel  bilateral facet arthrosis throughout the cervical spine. No abnormal prevertebral soft tissue edema.   Results for orders placed or performed in visit on 04/01/24   Hemoglobin A1c     Status: Abnormal   Result Value Ref Range    Hemoglobin A1C 5.7 (H) 0.0 - 5.6 %   TSH     Status: Normal   Result Value Ref Range    TSH 2.75 0.30 - 4.20 uIU/mL   Comprehensive metabolic panel (BMP + Alb, Alk Phos, ALT, AST, Total. Bili, TP)     Status: Abnormal   Result Value Ref Range    Sodium 142 135 - 145 mmol/L    Potassium 4.2 3.4 - 5.3 mmol/L    Carbon Dioxide (CO2) 26 22 - 29 mmol/L    Anion Gap 11 7 - 15 mmol/L    Urea Nitrogen 19.1 8.0 - 23.0 mg/dL    Creatinine 0.94 0.67 - 1.17 mg/dL    GFR Estimate 79 >60 mL/min/1.73m2    Calcium 9.6 8.8 - 10.2 mg/dL    Chloride 105 98 - 107 mmol/L    Glucose 56 (L) 70 - 99 mg/dL    Alkaline Phosphatase 60 40 - 150 U/L    AST 42 0 - 45 U/L    ALT 30 0 - 70 U/L    Protein Total 7.7 6.4 - 8.3 g/dL    Albumin 4.7 3.5 - 5.2 g/dL    Bilirubin Total 0.3 <=1.2 mg/dL   CBC with platelets and differential     Status: None   Result Value Ref Range    WBC Count 6.6 4.0 - 11.0 10e3/uL    RBC Count 5.07 4.40 - 5.90 10e6/uL    Hemoglobin 13.6 13.3 - 17.7 g/dL    Hematocrit 42.5 40.0 - 53.0 %    MCV 84 78 - 100 fL    MCH 26.8 26.5 - 33.0 pg    MCHC 32.0 31.5 - 36.5 g/dL    RDW 14.8 10.0 - 15.0 %    Platelet Count 198 150 - 450 10e3/uL    % Neutrophils 56 %    % Lymphocytes 27 %    % Monocytes 11 %    % Eosinophils 6 %    % Basophils 1 %    % Immature Granulocytes 0 %    Absolute Neutrophils 3.7 1.6 - 8.3 10e3/uL    Absolute Lymphocytes 1.7 0.8 - 5.3 10e3/uL    Absolute Monocytes 0.7 0.0 - 1.3 10e3/uL    Absolute Eosinophils 0.4 0.0 - 0.7 10e3/uL    Absolute Basophils 0.0 0.0 - 0.2 10e3/uL    Absolute Immature Granulocytes 0.0 <=0.4 10e3/uL   CBC with Platelets & Differential     Status: None    Narrative    The following orders were created for panel order CBC with Platelets & Differential.  Procedure                                Abnormality         Status                     ---------                               -----------         ------                     CBC with platelets and d...[[id number]]                      Final result                 Please view results for these tests on the individual orders.         Prior to immunization administration, verified patients identity using patient s name and date of birth. Please see Immunization Activity for additional information.     Screening Questionnaire for Adult Immunization    Are you sick today?   No   Do you have allergies to medications, food, a vaccine component or latex?   No   Have you ever had a serious reaction after receiving a vaccination?   No   Do you have a long-term health problem with heart, lung, kidney, or metabolic disease (e.g., diabetes), asthma, a blood disorder, no spleen, complement component deficiency, a cochlear implant, or a spinal fluid leak?  Are you on long-term aspirin therapy?   No   Do you have cancer, leukemia, HIV/AIDS, or any other immune system problem?   No   Do you have a parent, brother, or sister with an immune system problem?   No   In the past 3 months, have you taken medications that affect  your immune system, such as prednisone, other steroids, or anticancer drugs; drugs for the treatment of rheumatoid arthritis, Crohn s disease, or psoriasis; or have you had radiation treatments?   No   Have you had a seizure, or a brain or other nervous system problem?   No   During the past year, have you received a transfusion of blood or blood    products, or been given immune (gamma) globulin or antiviral drug?   No   For women: Are you pregnant or is there a chance you could become       pregnant during the next month?   No   Have you received any vaccinations in the past 4 weeks?   No     Immunization questionnaire answers were all negative.      Patient instructed to remain in clinic for 15 minutes afterwards, and to  report any adverse reactions.     Screening performed by Estephania Cordova on 4/1/2024 at 1:48 PM.   Signed Electronically by: MAYURI RODRIGUEZ MD

## 2024-04-01 NOTE — LETTER
April 2, 2024      Teodoro Gregorio  [address] [place] [address]        Dear ,    We are writing to inform you of your test results.    Your labs look reassuring.      Resulted Orders   Hemoglobin A1c   Result Value Ref Range    Hemoglobin A1C 5.7 (H) 0.0 - 5.6 %      Comment:      Normal <5.7%   Prediabetes 5.7-6.4%    Diabetes 6.5% or higher     Note: Adopted from ADA consensus guidelines.   TSH   Result Value Ref Range    TSH 2.75 0.30 - 4.20 uIU/mL   Comprehensive metabolic panel (BMP + Alb, Alk Phos, ALT, AST, Total. Bili, TP)   Result Value Ref Range    Sodium 142 135 - 145 mmol/L      Comment:      Reference intervals for this test were updated on 09/26/2023 to more accurately reflect our healthy population. There may be differences in the flagging of prior results with similar values performed with this method. Interpretation of those prior results can be made in the context of the updated reference intervals.     Potassium 4.2 3.4 - 5.3 mmol/L    Carbon Dioxide (CO2) 26 22 - 29 mmol/L    Anion Gap 11 7 - 15 mmol/L    Urea Nitrogen 19.1 8.0 - 23.0 mg/dL    Creatinine 0.94 0.67 - 1.17 mg/dL    GFR Estimate 79 >60 mL/min/1.73m2    Calcium 9.6 8.8 - 10.2 mg/dL    Chloride 105 98 - 107 mmol/L    Glucose 56 (L) 70 - 99 mg/dL    Alkaline Phosphatase 60 40 - 150 U/L      Comment:      Reference intervals for this test were updated on 11/14/2023 to more accurately reflect our healthy population. There may be differences in the flagging of prior results with similar values performed with this method. Interpretation of those prior results can be made in the context of the updated reference intervals.    AST 42 0 - 45 U/L      Comment:      Reference intervals for this test were updated on 6/12/2023 to more accurately reflect our healthy population. There may be differences in the flagging of prior results with similar values performed with this method. Interpretation of those prior results can be made in the  context of the updated reference intervals.    ALT 30 0 - 70 U/L      Comment:      Reference intervals for this test were updated on 6/12/2023 to more accurately reflect our healthy population. There may be differences in the flagging of prior results with similar values performed with this method. Interpretation of those prior results can be made in the context of the updated reference intervals.      Protein Total 7.7 6.4 - 8.3 g/dL    Albumin 4.7 3.5 - 5.2 g/dL    Bilirubin Total 0.3 <=1.2 mg/dL   CBC with platelets and differential   Result Value Ref Range    WBC Count 6.6 4.0 - 11.0 10e3/uL    RBC Count 5.07 4.40 - 5.90 10e6/uL    Hemoglobin 13.6 13.3 - 17.7 g/dL    Hematocrit 42.5 40.0 - 53.0 %    MCV 84 78 - 100 fL    MCH 26.8 26.5 - 33.0 pg    MCHC 32.0 31.5 - 36.5 g/dL    RDW 14.8 10.0 - 15.0 %    Platelet Count 198 150 - 450 10e3/uL    % Neutrophils 56 %    % Lymphocytes 27 %    % Monocytes 11 %    % Eosinophils 6 %    % Basophils 1 %    % Immature Granulocytes 0 %    Absolute Neutrophils 3.7 1.6 - 8.3 10e3/uL    Absolute Lymphocytes 1.7 0.8 - 5.3 10e3/uL    Absolute Monocytes 0.7 0.0 - 1.3 10e3/uL    Absolute Eosinophils 0.4 0.0 - 0.7 10e3/uL    Absolute Basophils 0.0 0.0 - 0.2 10e3/uL    Absolute Immature Granulocytes 0.0 <=0.4 10e3/uL       If you have any questions or concerns, please call the clinic at the number listed above.       Sincerely,      Fanny Sung MD

## 2024-04-02 LAB
ALBUMIN SERPL BCG-MCNC: 4.7 G/DL (ref 3.5–5.2)
ALP SERPL-CCNC: 60 U/L (ref 40–150)
ALT SERPL W P-5'-P-CCNC: 30 U/L (ref 0–70)
ANION GAP SERPL CALCULATED.3IONS-SCNC: 11 MMOL/L (ref 7–15)
AST SERPL W P-5'-P-CCNC: 42 U/L (ref 0–45)
BILIRUB SERPL-MCNC: 0.3 MG/DL
BUN SERPL-MCNC: 19.1 MG/DL (ref 8–23)
CALCIUM SERPL-MCNC: 9.6 MG/DL (ref 8.8–10.2)
CHLORIDE SERPL-SCNC: 105 MMOL/L (ref 98–107)
CREAT SERPL-MCNC: 0.94 MG/DL (ref 0.67–1.17)
DEPRECATED HCO3 PLAS-SCNC: 26 MMOL/L (ref 22–29)
EGFRCR SERPLBLD CKD-EPI 2021: 79 ML/MIN/1.73M2
GLUCOSE SERPL-MCNC: 56 MG/DL (ref 70–99)
POTASSIUM SERPL-SCNC: 4.2 MMOL/L (ref 3.4–5.3)
PROT SERPL-MCNC: 7.7 G/DL (ref 6.4–8.3)
SODIUM SERPL-SCNC: 142 MMOL/L (ref 135–145)
TSH SERPL DL<=0.005 MIU/L-ACNC: 2.75 UIU/ML (ref 0.3–4.2)

## 2024-04-07 ASSESSMENT — ENCOUNTER SYMPTOMS
HEMATURIA: 0
PALPITATIONS: 0
NECK PAIN: 1
ARTHRALGIAS: 0
ABDOMINAL PAIN: 0
COUGH: 0
VOMITING: 0
FEVER: 0
COLOR CHANGE: 0
SORE THROAT: 0
SHORTNESS OF BREATH: 0
EYE PAIN: 0
CHILLS: 0
DYSURIA: 0
SEIZURES: 0

## 2024-04-22 ENCOUNTER — TELEPHONE (OUTPATIENT)
Dept: FAMILY MEDICINE | Facility: CLINIC | Age: 84
End: 2024-04-22

## 2024-04-22 ENCOUNTER — OFFICE VISIT (OUTPATIENT)
Dept: FAMILY MEDICINE | Facility: CLINIC | Age: 84
End: 2024-04-22
Payer: COMMERCIAL

## 2024-04-22 VITALS
DIASTOLIC BLOOD PRESSURE: 76 MMHG | SYSTOLIC BLOOD PRESSURE: 110 MMHG | HEIGHT: 60 IN | BODY MASS INDEX: 23.36 KG/M2 | OXYGEN SATURATION: 97 % | WEIGHT: 119 LBS | TEMPERATURE: 97.8 F | RESPIRATION RATE: 16 BRPM | HEART RATE: 67 BPM

## 2024-04-22 DIAGNOSIS — F51.01 PRIMARY INSOMNIA: ICD-10-CM

## 2024-04-22 DIAGNOSIS — M54.2 NECK PAIN: ICD-10-CM

## 2024-04-22 DIAGNOSIS — R13.10 DYSPHAGIA, UNSPECIFIED TYPE: ICD-10-CM

## 2024-04-22 DIAGNOSIS — R63.0 DECREASED APPETITE: ICD-10-CM

## 2024-04-22 DIAGNOSIS — R06.2 WHEEZING: ICD-10-CM

## 2024-04-22 DIAGNOSIS — F33.1 MODERATE EPISODE OF RECURRENT MAJOR DEPRESSIVE DISORDER (H): ICD-10-CM

## 2024-04-22 DIAGNOSIS — G89.29 CHRONIC BILATERAL LOW BACK PAIN WITHOUT SCIATICA: ICD-10-CM

## 2024-04-22 DIAGNOSIS — M54.50 CHRONIC BILATERAL LOW BACK PAIN WITHOUT SCIATICA: ICD-10-CM

## 2024-04-22 DIAGNOSIS — R26.9 IMPAIRED GAIT: ICD-10-CM

## 2024-04-22 DIAGNOSIS — Z00.00 ROUTINE GENERAL MEDICAL EXAMINATION AT A HEALTH CARE FACILITY: Primary | ICD-10-CM

## 2024-04-22 PROCEDURE — 99397 PER PM REEVAL EST PAT 65+ YR: CPT | Performed by: STUDENT IN AN ORGANIZED HEALTH CARE EDUCATION/TRAINING PROGRAM

## 2024-04-22 PROCEDURE — 99214 OFFICE O/P EST MOD 30 MIN: CPT | Mod: 25 | Performed by: STUDENT IN AN ORGANIZED HEALTH CARE EDUCATION/TRAINING PROGRAM

## 2024-04-22 RX ORDER — RAMELTEON 8 MG/1
8 TABLET ORAL AT BEDTIME
Qty: 90 TABLET | Refills: 3 | Status: SHIPPED | OUTPATIENT
Start: 2024-04-22

## 2024-04-22 RX ORDER — ALBUTEROL SULFATE 90 UG/1
2 AEROSOL, METERED RESPIRATORY (INHALATION) EVERY 6 HOURS PRN
Qty: 18 G | Refills: 0 | Status: SHIPPED | OUTPATIENT
Start: 2024-04-22

## 2024-04-22 SDOH — HEALTH STABILITY: PHYSICAL HEALTH: ON AVERAGE, HOW MANY DAYS PER WEEK DO YOU ENGAGE IN MODERATE TO STRENUOUS EXERCISE (LIKE A BRISK WALK)?: 3 DAYS

## 2024-04-22 SDOH — HEALTH STABILITY: PHYSICAL HEALTH: ON AVERAGE, HOW MANY MINUTES DO YOU ENGAGE IN EXERCISE AT THIS LEVEL?: 20 MIN

## 2024-04-22 ASSESSMENT — SOCIAL DETERMINANTS OF HEALTH (SDOH): HOW OFTEN DO YOU GET TOGETHER WITH FRIENDS OR RELATIVES?: THREE TIMES A WEEK

## 2024-04-22 ASSESSMENT — PATIENT HEALTH QUESTIONNAIRE - PHQ9
SUM OF ALL RESPONSES TO PHQ QUESTIONS 1-9: 11
SUM OF ALL RESPONSES TO PHQ QUESTIONS 1-9: 11
10. IF YOU CHECKED OFF ANY PROBLEMS, HOW DIFFICULT HAVE THESE PROBLEMS MADE IT FOR YOU TO DO YOUR WORK, TAKE CARE OF THINGS AT HOME, OR GET ALONG WITH OTHER PEOPLE: SOMEWHAT DIFFICULT

## 2024-04-22 NOTE — PATIENT INSTRUCTIONS
Preventive Care Advice   Asked your pharmacy about getting the RSV and shingles vaccines in order to be covered by insurance.  This is general advice given by our system to help you stay healthy. However, your care team may have specific advice just for you. Please talk to your care team about your preventive care needs.  Nutrition  Eat 5 or more servings of fruits and vegetables each day.  Try wheat bread, brown rice and whole grain pasta (instead of white bread, rice, and pasta).  Get enough calcium and vitamin D. Check the label on foods and aim for 100% of the RDA (recommended daily allowance).  Lifestyle  Exercise at least 150 minutes each week   (30 minutes a day, 5 days a week).  Do muscle strengthening activities 2 days a week. These help control your weight and prevent disease.  No smoking.  Wear sunscreen to prevent skin cancer.  Have a dental exam and cleaning every 6 months.  Yearly exams  See your health care team every year to talk about:  Any changes in your health.  Any medicines your care team has prescribed.  Preventive care, family planning, and ways to prevent chronic diseases.  Shots (vaccines)   HPV shots (up to age 26), if you've never had them before.  Hepatitis B shots (up to age 59), if you've never had them before.  COVID-19 shot: Get this shot when it's due.  Flu shot: Get a flu shot every year.  Tetanus shot: Get a tetanus shot every 10 years.  Pneumococcal, hepatitis A, and RSV shots: Ask your care team if you need these based on your risk.  Shingles shot (for age 50 and up).  General health tests  Diabetes screening:  Starting at age 35, Get screened for diabetes at least every 3 years.  If you are younger than age 35, ask your care team if you should be screened for diabetes.  Cholesterol test: At age 39, start having a cholesterol test every 5 years, or more often if advised.  Bone density scan (DEXA): At age 50, ask your care team if you should have this scan for osteoporosis  (brittle bones).  Hepatitis C: Get tested at least once in your life.  STIs (sexually transmitted infections)  Before age 24: Ask your care team if you should be screened for STIs.  After age 24: Get screened for STIs if you're at risk. You are at risk for STIs (including HIV) if:  You are sexually active with more than one person.  You don't use condoms every time.  You or a partner was diagnosed with a sexually transmitted infection.  If you are at risk for HIV, ask about PrEP medicine to prevent HIV.  Get tested for HIV at least once in your life, whether you are at risk for HIV or not.  Cancer screening tests  Cervical cancer screening: If you have a cervix, begin getting regular cervical cancer screening tests at age 21. Most people who have regular screenings with normal results can stop after age 65. Talk about this with your provider.  Breast cancer scan (mammogram): If you've ever had breasts, begin having regular mammograms starting at age 40. This is a scan to check for breast cancer.  Colon cancer screening: It is important to start screening for colon cancer at age 45.  Have a colonoscopy test every 10 years (or more often if you're at risk) Or, ask your provider about stool tests like a FIT test every year or Cologuard test every 3 years.  To learn more about your testing options, visit: https://[email or web address]/215733.pdf.  For help making a decision, visit: https://bit.ly/jn04198.  Prostate cancer screening test: If you have a prostate and are age 55 to 69, ask your provider if you would benefit from a yearly prostate cancer screening test.  Lung cancer screening: If you are a current or former smoker age 50 to 80, ask your care team if ongoing lung cancer screenings are right for you.  For informational purposes only. Not to replace the advice of your health care provider. Copyright   2023 [employer]. All rights reserved. Clinically reviewed by the [facility].  [employer] [id number] - REV 01/24.

## 2024-04-22 NOTE — PROGRESS NOTES
Preventive Care Visit  [facility]  Tricia Cohn MD, Family Medicine  Apr 22, 2024      Teodoro was seen today for physical.    Diagnoses and all orders for this visit:    Routine general medical examination at a health care facility  -     PRIMARY CARE FOLLOW-UP SCHEDULING; Future  -     REVIEW OF HEALTH MAINTENANCE PROTOCOL ORDERS    Primary insomnia  Comments:  improved. continue ramelteon    Orders:  -     ramelteon (ROZEREM) 8 MG tablet; Take 1 tablet (8 mg) by mouth at bedtime    Chronic bilateral low back pain without sciatica  Previously seen by physical medicine in 2019, MRI completed, per physical medicine: MRI which does show lumbar scoliosis and facet arthritis as well as multilevel disc height loss with nerve compression, no concerning findings however. Recommend following up with physical therapy, if symptoms are not improving follow-up in clinic to discuss injection options.   Appears the patient completed PT  -     Spine  Referral; Future    Neck pain  Comments:  XR showed arthritis.  -Refer to spine  -Continue Tylenol as needed    Decreased appetite  Comments:  Weight stable, CMP normal. Recently started on mirtazapine, unsure if helping, will continue.    Moderate episode of recurrent major depressive disorder (H)  Comments:  Chronic, stable. Denies SI or HI. Recently started on mirtazapine, unsure if helping, will continue.    Impaired gait  Comments:  Uses walker. Denies falls.    Dysphagia, unspecified type  Comments:  Chokes when he swallows. CXR normal at last visit. Refer to ST for swallow study.  Orders:  -     Speech Therapy  Referral; Future  -     XR Video Swallow with SLP or OT - Order with Speech Therapy Referral; Future    Wheezing  -     General PFT Lab (Please always keep checked); Future  -     Pulmonary Function Test; Future  -     albuterol (PROAIR HFA/PROVENTIL HFA/VENTOLIN HFA) 108 (90 Base) MCG/ACT inhaler; Inhale 2 puffs into the lungs  every 6 hours as needed for shortness of breath, wheezing or cough          Subjective   Teodoro is a 84 year old, presenting for the following:  Physical (Neck and back pain)        4/22/2024    10:48 AM   Additional Questions   Roomed by    Accompanied by PCA        Health Care Directive  Patient has a Health Care Directive on file  Discussed advance care planning with patient.    HPI  Chronic low back pain  Previously seen by physical medicine in 2019, MRI completed, per physical medicine: MRI which does show lumbar scoliosis and facet arthritis as well as multilevel disc height loss with nerve compression, no concerning findings however. Recommend following up with physical therapy, if symptoms are not improving follow-up in clinic to discuss injection options.   Appears the patient completed PT    At last visit 4/1/2024, patient complained of aspiration, wheezing.  CXR normal.  CBC normal.  PFT? None before.   Swallow study? None before.     Low appetite-started on mirtazapine nightly, for comorbid depression.  Albumin and protein are normal on CMP.  Wt Readings from Last 4 Encounters:   04/01/24 54 kg (119 lb)   11/09/23 52.6 kg (116 lb)   03/30/23 53.5 kg (118 lb)   01/05/23 48.1 kg (106 lb)             4/22/2024   General Health   How would you rate your overall physical health? (!) FAIR   Feel stress (tense, anxious, or unable to sleep) Not at all         4/22/2024   Nutrition   Diet: Regular (no restrictions)         4/22/2024   Exercise   Days per week of moderate/strenous exercise 3 days   Average minutes spent exercising at this level 20 min         4/22/2024   Social Factors   Frequency of gathering with friends or relatives Three times a week   Worry food won't last until get money to buy more No   Food not last or not have enough money for food? No   Do you have housing?  Yes   Are you worried about losing your housing? No   Lack of transportation? No   Unable to get utilities (heat,electricity)? No          4/22/2024   Fall Risk   Fallen 2 or more times in the past year? No   Trouble with walking or balance? YesChronic, uses walker, denies falls.    Reason Gait Speed Test Not Completed Patient declines   Reason for decline back pain          4/22/2024   Activities of Daily Living- Home Safety   Needs help with the following daily activites Telephone use    Transportation    Shopping    Preparing meals    Housework    Bathing    Laundry    Medication administration    Money management    Toileting    Feeding    Dressing   Safety concerns in the home None of the above         4/22/2024   Dental   Dentist two times every year? (!) NO         4/22/2024   Hearing Screening   Hearing concerns? (!) I NEED TO ASK PEOPLE TO SPEAK UP OR REPEAT THEMSELVES.    (!) TROUBLE UNDESTANDING A SPEAKER IN A PUBLIC MEETING OR [employer] SERVICE.    (!) TROUBLE UNDERSTANDING SOFT OR WHISPERED SPEECH.    (!) TROUBLE UNDERSTANDING SPEECH ON THE TELEPHONE         4/22/2024   Driving Risk Screening   Patient/family members have concerns about driving No         4/22/2024   General Alertness/Fatigue Screening   Have you been more tired than usual lately? (!) YES         4/22/2024   Urinary Incontinence Screening   Bothered by leaking urine in past 6 months Yes         4/22/2024   TB Screening   Were you born outside of the [place]? Yes       Today's PHQ-9 Score:       4/22/2024    10:44 AM   PHQ-9 SCORE   PHQ-9 Total Score MyChart 11 (Moderate depression)   PHQ-9 Total Score 11         4/22/2024   Substance Use   Alcohol more than 3/day or more than 7/wk No   Do you have a current opioid prescription? No   How severe/bad is pain from 1 to 10? 5/10   Do you use any other substances recreationally? No     Social History     Tobacco Use    Smoking status: Former    Smokeless tobacco: Current     Types: Chew    Tobacco comments:     betel nut   Vaping Use    Vaping status: Never Used   Substance Use Topics    Alcohol use: No    Drug use: No  "    Reviewed and updated as needed this visit by Provider                    Current providers sharing in care for this patient include:  Patient Care Team:  Tricia Cohn MD as PCP - General (Family Medicine)  Roxane Kruse, RN Care Coordinator [employer] as Registered Nurse  Tricia Cohn MD as Assigned PCP    The following health maintenance items are reviewed in Epic and correct as of today:  Health Maintenance   Topic Date Due    DEPRESSION ACTION PLAN  Never done    ZOSTER IMMUNIZATION (1 of 2) Never done    RSV VACCINE (Pregnancy & 60+) (1 - 1-dose 60+ series) Never done    PHQ-9  10/22/2024    MEDICARE ANNUAL WELLNESS VISIT  04/22/2025    ANNUAL REVIEW OF HM ORDERS  04/22/2025    FALL RISK ASSESSMENT  04/22/2025    DTAP/TDAP/TD IMMUNIZATION (2 - Td or Tdap) 04/07/2026    ADVANCE CARE PLANNING  04/22/2029    INFLUENZA VACCINE  Completed    Pneumococcal Vaccine: 65+ Years  Completed    COVID-19 Vaccine  Completed    IPV IMMUNIZATION  Aged Out    HPV IMMUNIZATION  Aged Out    MENINGITIS IMMUNIZATION  Aged Out    RSV MONOCLONAL ANTIBODY  Aged Out            Objective    Exam  /76 (BP Location: Left arm, Patient Position: Sitting, Cuff Size: Adult Small)   Pulse 67   Temp 97.8  F (36.6  C) (Temporal)   Resp 16   Ht 1.53 m (5' 0.24\")   Wt 54 kg (119 lb)   SpO2 97%   BMI 23.06 kg/m     Estimated body mass index is 23.06 kg/m  as calculated from the following:    Height as of this encounter: 1.53 m (5' 0.24\").    Weight as of this encounter: 54 kg (119 lb).    Physical Exam  General Appearance:  Alert, cooperative, no distress, appears stated age.  Head:  Normocephalic, without obvious abnormality, atraumatic.  Eyes:  Conjunctivae/corneas clear, extraocular movements intact both eyes.  Lungs:  Clear to auscultation bilaterally, respirations unlabored.  Heart:  Regular rate and rhythm, S1 and S2 normal, no murmur, rub or gallop.  Abdomen:  Soft, non-tender, bowel sounds active all four quadrants. "   Extremities:  Atraumatic, no cyanosis or edema.  Skin:  Skin color, texture, turgor normal, no rashes or lesions.  Neurologic: No focal deficits.          4/22/2024   Mini Cog   Mini-Cog Not Completed (choose reason) Patient declines             Signed Electronically by: Tricia Cohn MD    Prior to immunization administration, verified patients identity using patient s name and date of birth. Please see Immunization Activity for additional information.     Screening Questionnaire for Adult Immunization    Are you sick today?   No   Do you have allergies to medications, food, a vaccine component or latex?   No   Have you ever had a serious reaction after receiving a vaccination?   No   Do you have a long-term health problem with heart, lung, kidney, or metabolic disease (e.g., diabetes), asthma, a blood disorder, no spleen, complement component deficiency, a cochlear implant, or a spinal fluid leak?  Are you on long-term aspirin therapy?   No   Do you have cancer, leukemia, HIV/AIDS, or any other immune system problem?   No   Do you have a parent, brother, or sister with an immune system problem?   No   In the past 3 months, have you taken medications that affect  your immune system, such as prednisone, other steroids, or anticancer drugs; drugs for the treatment of rheumatoid arthritis, Crohn s disease, or psoriasis; or have you had radiation treatments?   No   Have you had a seizure, or a brain or other nervous system problem?   No   During the past year, have you received a transfusion of blood or blood    products, or been given immune (gamma) globulin or antiviral drug?   No   For women: Are you pregnant or is there a chance you could become       pregnant during the next month?   No   Have you received any vaccinations in the past 4 weeks?   No     Immunization questionnaire answers were all negative.      Patient instructed to remain in clinic for 15 minutes afterwards, and to report any adverse reactions.      Screening performed by Lev Boo MA on 4/22/2024 at 10:52 AM.       Answers submitted by the patient for this visit:  Patient Health Questionnaire (Submitted on 4/22/2024)  If you checked off any problems, how difficult have these problems made it for you to do your work, take care of things at home, or get along with other people?: Somewhat difficult  PHQ9 TOTAL SCORE: 11

## 2024-04-22 NOTE — PROGRESS NOTES
Chronic low back pain  Previously seen by physical medicine in 2019, MRI completed, per physical medicine: MRI which does show lumbar scoliosis and facet arthritis as well as multilevel disc height loss with nerve compression, no concerning findings however. Recommend following up with physical therapy, if symptoms are not improving follow-up in clinic to discuss injection options.   Appears the patient completed PT    At last visit 4/1/2024, patient complained of aspiration, wheezing.  CXR normal.  CBC normal.  PFT?  Swallow study?  Low appetite-started on mirtazapine nightly, for comorbid depression.  Albumin and protein are normal on CMP.  Wt Readings from Last 4 Encounters:   04/01/24 54 kg (119 lb)   11/09/23 52.6 kg (116 lb)   03/30/23 53.5 kg (118 lb)   01/05/23 48.1 kg (106 lb)

## 2024-04-23 ENCOUNTER — OFFICE VISIT (OUTPATIENT)
Dept: PULMONOLOGY | Facility: CLINIC | Age: 84
End: 2024-04-23
Attending: STUDENT IN AN ORGANIZED HEALTH CARE EDUCATION/TRAINING PROGRAM
Payer: COMMERCIAL

## 2024-04-23 DIAGNOSIS — R06.2 WHEEZING: ICD-10-CM

## 2024-04-23 PROCEDURE — 94375 RESPIRATORY FLOW VOLUME LOOP: CPT | Mod: 26 | Performed by: INTERNAL MEDICINE

## 2024-04-29 PROBLEM — R94.2 ABNORMAL PFT: Status: ACTIVE | Noted: 2024-04-29

## 2024-04-30 ENCOUNTER — TELEPHONE (OUTPATIENT)
Dept: FAMILY MEDICINE | Facility: CLINIC | Age: 84
End: 2024-04-30
Payer: COMMERCIAL

## 2024-04-30 NOTE — TELEPHONE ENCOUNTER
----- Message from Tricia Cohn MD sent at 4/29/2024  1:32 PM CDT -----  Pulmonary function test show that there may be some restrictive process, or inability for the lungs to fully expand.  There may also be obstruction with breathing from outside the lungs.  I recommend that you see the lung doctor regarding this abnormal pulmonary function test and for you to complete the swallow study as discussed at our appointment.  If you are okay with seeing the lung doctor, I can place a referral.

## 2024-04-30 NOTE — TELEPHONE ENCOUNTER
----- Message from Tricia Cohn MD sent at 4/29/2024  1:32 PM CDT -----  Pulmonary function test show that there may be some restrictive process, or inability for the lungs to fully expand.  There may also be obstruction with breathing from outside the lungs.  I recommend that you see the lung doctor regarding this abnormal pulmonary function test and for you to complete the swallow study as discussed at our appointment.  If you are okay with seeing the lung doctor, I can place a referral.    Called placed to pt with assistance of an . However, no answer. Message left request a return call.  Clinic number provided.    Nuno Pérez RN  [facility]

## 2024-05-03 ENCOUNTER — HOSPITAL ENCOUNTER (OUTPATIENT)
Dept: SPEECH THERAPY | Facility: HOSPITAL | Age: 84
Discharge: HOME | End: 2024-05-03
Attending: STUDENT IN AN ORGANIZED HEALTH CARE EDUCATION/TRAINING PROGRAM
Payer: COMMERCIAL

## 2024-05-03 ENCOUNTER — HOSPITAL ENCOUNTER (OUTPATIENT)
Dept: RADIOLOGY | Facility: HOSPITAL | Age: 84
Discharge: HOME | End: 2024-05-03
Attending: STUDENT IN AN ORGANIZED HEALTH CARE EDUCATION/TRAINING PROGRAM
Payer: COMMERCIAL

## 2024-05-03 DIAGNOSIS — R13.10 DYSPHAGIA, UNSPECIFIED TYPE: ICD-10-CM

## 2024-05-03 PROCEDURE — 92611 MOTION FLUOROSCOPY/SWALLOW: CPT | Mod: GN

## 2024-05-03 PROCEDURE — 92526 ORAL FUNCTION THERAPY: CPT | Mod: GN

## 2024-05-03 PROCEDURE — 92610 EVALUATE SWALLOWING FUNCTION: CPT | Mod: GN

## 2024-05-03 PROCEDURE — 74230 X-RAY XM SWLNG FUNCJ C+: CPT

## 2024-05-03 NOTE — PROGRESS NOTES
"SPEECH LANGUAGE PATHOLOGY EVALUATION    See electronic medical record for Abuse and Falls Screening details.    Subjective   Presenting condition or subjective complaint:  Choking when eating  Relevant medical history:   Per ordering provider on 4/22/24 \"Chronic low back pain  Previously seen by physical medicine in 2019, MRI completed, per physical medicine: MRI which does show lumbar scoliosis and facet arthritis as well as multilevel disc height loss with nerve compression, no concerning findings however. Recommend following up with physical therapy, if symptoms are not improving follow-up in clinic to discuss injection options.   Appears the patient completed PT     At last visit 4/1/2024, patient complained of aspiration, wheezing.  CXR normal.  CBC normal.  PFT? None before.   Swallow study? None before.      Low appetite-started on mirtazapine nightly, for comorbid depression.  Albumin and protein are normal on CMP.\"      Prior diagnostic imaging/testing results:     na  Prior therapy history for the same diagnosis, illness or injury:    na       Objective     SWALLOW EVALUTION  Dysphagia history: Patient is accompanied by his granddaughter and an . Pt is hard of hearing and his granddaughter helps with providing hx. She stated that he eats very soft foods at home d/t poor dentition. She stated that he will cough or choke on food/liquids. She reports that when eating he takes large handfuls of food or uses a traditional asian soup spoon heaping with food. He also has a tendency to guzzle liquids.   Current Diet/Method of Nutritional Intake: thin liquids (level 0), soft & bite-sized (level 6)        CLINICAL SWALLOW EVALUATION  Oral Motor Function: Dentition: Dentition is broken and in poor condition. Teeth, gums, and tongue are black. Pt chewing betel nut at arrival.  Secretion management: WFL  Mucosal quality: adequate  Mandibular function: intact  Oral labial function: WFL  Lingual function: " WFL  Velar function: intact   Buccal function: intact  Laryngeal function: throat clear, volitional swallow, voicing WFL     *Pt cough prior to PO trials    Textures Trialed:   Clinical Swallow Eval: Thin Liquids  Mode of presentation: cup   Volume presented: 3 sips  Preparatory Phase: WFL  Oral Phase: WFL  Pharyngeal phase of swallow: intact   Diagnostic statement: No overt s/s aspiration    VIDEOFLUOROSCOPIC SWALLOW STUDY  Radiologist: Dr. Moss  Views Taken: left lateral   Physical location of procedure: [facility]  VFSS textures trialed:   VFSS Eval: Thin Liquids  Mode of Presentation: cup, spoon, straw   Order of Presentation: 3,4,5,6,7  Preparatory Phase: WFL  Oral Phase: premature pharyngeal entry  Bolus Location When Swallow Initiated: pyriforms  Pharyngeal Phase:  delayed initiation  Rosenbeck's Penetration Aspiration Scale: 1 - no aspiration, contrast does not enter airway  Strategies and Compensations: reduce bolus size  Diagnostic Statement: Patient's swallow was timely to delayed, with initiation at the pyriform sinuses. Reduced bolus size did not appear to impact delay but did reduce quantity of pourover.    VFSS Eval: Mildly Thick Liquids  Mode of Presentation: cup, spoon, straw   Order of Presentation: 1,2  Preparatory Phase: WFL  Oral Phase: premature pharyngeal entry  Bolus Location When Swallow Initiated: pyriforms  Pharyngeal Phase: delayed initiation  Rosenbeck's Penetration Aspiration Scale: 1 - no aspiration, contrast does not enter airway  Strategies and Compensations: reduce bolus size  Diagnostic Statement: Patient's swallow was timely to delayed, with initiation at the pyriform sinuses. Reduced bolus size did not appear to impact delay but did reduce quantity of pourover.    VFSS Eval: Soft & Bite Sized  Mode of Presentation: spoon   Order of Presentation: 8,9  Preparatory Phase: WFL  Oral Phase: premature pharyngeal entry  Bolus Location When Swallow Initiated: pyriforms  Pharyngeal  Phase: delayed initiation  Rosenbeck's Penetration Aspiration Scale: 1 - no aspiration, contrast does not enter airway  Strategies and Compensations: reduce bolus size    ESOPHAGEAL PHASE OF SWALLOW  no observed or reported concerns related to esophageal function     SWALLOW ASSESSMENT CLINICAL IMPRESSIONS AND RATIONALE  Diet Consistency Recommendations: thin liquids (level 0), soft & bite-sized (level 6)    Recommended Feeding/Eating Techniques:One bite or sip at a time, no talking prior to swallow, small bolus size, maintain upright sitting position for eating   Medication Administration Recommendations: One at a time in water or puree      Assessment & Plan   CLINICAL IMPRESSIONS   Medical Diagnosis: dysphagia    Treatment Diagnosis: dysphagia   Impression/Assessment: Videofluoroscopic Swallow Study completed. Patient had no aspiration or penetration with any consistency. Note that pt did have coughing throughout evaluation in the absence of aspiration, penetration, or stasis. Oral phase is WFL. Tongue base retraction was WFL. Swallow response was timely to delayed with initiation at the pyriform sinuses across textures. Epiglottic inversion was complete.  No significant stasis occurred. Hyolaryngeal elevation was WFL and hyolaryngeal excursion was WFL.  Mastication WFL for soft texture. Cricopharyngeus WFL. Reviewed results, recommendations, and aspiration risks. Pt appears to be at low aspiration risk, however, this risk is increased with impulsive eating habits such as large bites/sips and fast pace (which pt's granddaughter describes happens at home). They were receptive to education and strategy training.      PLAN OF CARE  Treatment Interventions: Swallowing dysfunction and/or oral function for feeding    Prognosis to achieve stated therapy goals is good   Rehab potential is impacted by: family/caregiver support, patient awareness of deficits    Long Term Goals:   SLP Goal 1  Goal Identifier:  Dysphagia  Goal Description: Pt will demonstrate small single sip strategy with cues  Rationale: To maximize safety, ease and/or independence of oral intake  Goal Progress: Met  Target Date: 05/03/24  Date Met: 05/03/24      Frequency of Treatment: One time eval and treat  Duration of Treatment: 5/3/24-5/3/24     Recommended Referrals to Other Professionals: Return to referring provider  Education Assessment:   Learner/Method: Patient;Family;Listening;Demonstration;Pictures/Video  Education Comments: Impaired pt hearing.    Risks and benefits of evaluation/treatment have been explained.   Patient/Family/caregiver agrees with Plan of Care.     Evaluation Time:    SLP Eval: oral/pharyngeal swallow function, clinical minutes (64895): 15  SLP Eval: VideoFluoroscopic Swallow function Minutes (47981): 10        Signing Clinician: NETO Frances    [facility]                                                                                   OUTPATIENT SPEECH LANGUAGE PATHOLOGY      PLAN OF TREATMENT FOR OUTPATIENT REHABILITATION   Patient's Last Name, First Name, Teodoro Martinez    YOB: 1940   Provider's Name   [facility]   Medical Record No.  [id number]     Onset Date: 05/03/24 Start of Care Date: 05/03/24     Medical Diagnosis:  dysphagia      SLP Treatment Diagnosis: dysphagia  Plan of Treatment  Frequency/Duration: One time eval and treat  / 5/3/24-5/3/24     Certification date from 05/03/24   To 05/03/24          See note for plan of treatment details and functional goals     Josefa Tsang, SLP                         I CERTIFY THE NEED FOR THESE SERVICES FURNISHED UNDER        THIS PLAN OF TREATMENT AND WHILE UNDER MY CARE     (Physician attestation of this document indicates review and certification of the therapy plan).              Referring Provider:  Tricia Conh    Initial Assessment  See Epic Evaluation- 05/03/24

## 2024-05-07 ENCOUNTER — TELEPHONE (OUTPATIENT)
Dept: FAMILY MEDICINE | Facility: CLINIC | Age: 84
End: 2024-05-07
Payer: COMMERCIAL

## 2024-05-07 NOTE — TELEPHONE ENCOUNTER
Prior Authorization Not Needed per Insurance    Medication: RAMELTEON 8 MG PO TABS  Insurance Company: [employer] - Phone [phone] Fax [phone]  Expected CoPay: $    Pharmacy Filling the Rx: [facility] - [place], [place] - [address] [place] [address]  Pharmacy Notified: YES    Patient Notified:     Insurance prefers [employer].  Called pharmacy, they received a paid claim and the patient has already picked up.

## 2024-05-07 NOTE — TELEPHONE ENCOUNTER
----- Message from Tricia Cohn MD sent at 5/6/2024  5:43 PM CDT -----  Swallow test was normal.  Please make sure to eat small bites and take small sips, and eat at slower pace.

## 2024-05-07 NOTE — TELEPHONE ENCOUNTER
"Writer attempt #1 to call patient with the help of a \"Anamika\"  regarding clinician's message below. No answer, left non-detailed voicemail, with clinic call back number.     If patient/ family calls back, please relay clinician's message to them. Thanks.    KOFFI AguirreN, RN   [facility]    "

## 2024-05-07 NOTE — TELEPHONE ENCOUNTER
[facility]ail Pharmacy Prior Authorization Team   Phone: [phone]    PA Initiation    Medication: RAMELTEON 8 MG PO TABS  Insurance Company: [employer] [facility] - Phone [phone] Fax [phone]  Pharmacy Filling the Rx: [facility] - [place], [place] - [address] [place] [address]  Filling Pharmacy Phone: [phone]  Filling Pharmacy Fax:    Start Date: 5/7/2024

## 2024-05-28 LAB
ERV-%PRED-PRE: 8 %
ERV-PRE: 0.06 L
ERV-PRED: 0.73 L
EXPTIME-PRE: 3.53 SEC
FEF2575-%PRED-PRE: 60 %
FEF2575-PRE: 0.81 L/SEC
FEF2575-PRED: 1.34 L/SEC
FEFMAX-%PRED-PRE: 45 %
FEFMAX-PRE: 1.99 L/SEC
FEFMAX-PRED: 4.35 L/SEC
FEV1-%PRED-PRE: 58 %
FEV1-PRE: 1.05 L
FEV1FEV6-PRE: 73 %
FEV1FEV6-PRED: 75 %
FEV1FVC-PRE: 73 %
FEV1FVC-PRED: 77 %
FEV1SVC-PRE: 91 %
FEV1SVC-PRED: 67 %
FIFMAX-PRE: 1.41 L/SEC
FVC-%PRED-PRE: 60 %
FVC-PRE: 1.43 L
FVC-PRED: 2.37 L
IC-%PRED-PRE: 74 %
IC-PRE: 1.09 L
IC-PRED: 1.47 L
VC-%PRED-PRE: 42 %
VC-PRE: 1.15 L
VC-PRED: 2.7 L

## 2024-05-31 ENCOUNTER — TELEPHONE (OUTPATIENT)
Dept: FAMILY MEDICINE | Facility: CLINIC | Age: 84
End: 2024-05-31
Payer: COMMERCIAL

## 2024-05-31 DIAGNOSIS — R94.2 ABNORMAL PFT: Primary | ICD-10-CM

## 2024-05-31 NOTE — TELEPHONE ENCOUNTER
----- Message from Tricia Cohn MD sent at 5/30/2024  5:21 PM CDT -----  Please make sure pt received message below:  Pulmonary function test show that there may be some restrictive process, or inability for the lungs to fully expand.  There may also be obstruction with breathing from outside the lungs.  I recommend that you see the lung doctor regarding this abnormal pulmonary function test and for you to complete the swallow study as discussed at our appointment.  If you are okay with seeing the lung doctor, I can place a referral.

## 2024-06-04 NOTE — TELEPHONE ENCOUNTER
Called patient--discussed results with patient's [facility] (c2c on file). She verbalized understanding of results and would like referral to pulmonology. Of note, swallow study has already been completed.

## 2024-07-09 ENCOUNTER — OFFICE VISIT (OUTPATIENT)
Dept: PULMONOLOGY | Facility: CLINIC | Age: 84
End: 2024-07-09
Attending: STUDENT IN AN ORGANIZED HEALTH CARE EDUCATION/TRAINING PROGRAM
Payer: COMMERCIAL

## 2024-07-09 VITALS
HEART RATE: 60 BPM | OXYGEN SATURATION: 99 % | WEIGHT: 113.8 LBS | SYSTOLIC BLOOD PRESSURE: 127 MMHG | DIASTOLIC BLOOD PRESSURE: 71 MMHG | BODY MASS INDEX: 22.34 KG/M2 | HEIGHT: 60 IN

## 2024-07-09 DIAGNOSIS — R94.2 RESTRICTIVE PATTERN PRESENT ON PULMONARY FUNCTION TESTING: ICD-10-CM

## 2024-07-09 DIAGNOSIS — R94.2 ABNORMAL PFT: ICD-10-CM

## 2024-07-09 DIAGNOSIS — R06.2 WHEEZING: Primary | ICD-10-CM

## 2024-07-09 PROCEDURE — 99204 OFFICE O/P NEW MOD 45 MIN: CPT | Performed by: NURSE PRACTITIONER

## 2024-07-09 RX ORDER — BUDESONIDE AND FORMOTEROL FUMARATE DIHYDRATE 160; 4.5 UG/1; UG/1
2 AEROSOL RESPIRATORY (INHALATION) 2 TIMES DAILY
Qty: 10.2 G | Refills: 11 | Status: SHIPPED | OUTPATIENT
Start: 2024-07-09

## 2024-07-09 NOTE — PROGRESS NOTES
Pulmonary Outpatient Consult Note  July 9, 2024      Assessment and Plan:   Teodoro Gregorio is a 84 year old M, former smoker, with history of chronic back pain, MDD presenting today for evaluation of wheeze.   The patient did not engage much during the visit. His family member who he lives with answered most questions via the . Denies hx of asthma or COPD. Former pipe smoker ~70 years, quit in 2015. Has noticed wheeze and chest tightness over the past year. Albuterol has been occasionally helpful. Denies other breathing symptoms but family reports they feel he is occasionally aspirating or having swallow difficulty.     PFTs should be interpreted with caution as he had difficulty doing the test but there does appear to be restriction with a flattened inspiratory limb.   VSS unremarkable. Recent CXR normal.   Lung exam and SpO2 are normal today.     #. Restriction on PFTs, difficulty swallowing: Given decreased FEV1 and FVC with flattened inspiratory limb we will investigate extrathoracic cause for symptoms.    ENT referral   Hi-res CT chest   #. Wheeze: As we have not tried a ICS yet we will do so to eliminate any possible underlying asthma. Unlikely given normal ratio of PFTs but the test may not be accurate and he has found albuterol helpful.   Symbicort (or equivalent ICS/LABA), as prescribed. Use daily no matter what for at least 1 month. If helpful continue use.   Albuterol prn   #. HCM: UTD with COVID vaccine, seasonal flu, PCV 13, and PPSV 23      RTC in 2 months for inhaler recheck    Lisa Sun, CNP  Pulmonary Medicine  ______________________________________________________________________________    CC:   Chief Complaint   Patient presents with    Consult     R94.2 (ICD-10-CM) - Abnormal PFT; Referred by Tricia Cohn MD; MR- Epic  XR VIDEO SWALLOW STUDY 5/3/24  PFT 4/23/24  CXR 4/1/24        HPI:   Teodoro presents with his family member for evaluation of wheeze and chest tightness x 1 year. He is  being seen with the help of a Anamika  via iPad.     He reports this is more frequent recently. Family states it is like he has asthma.   Given albuterol inhaler from PCP, reports it is occasionally helpful.   Will occasionally cough. Family states this only happens when he is trying to sleep. Occasionally eating and drinking will trigger things.     At PCP visit on 4/1/2024 he complained of aspiration, wheezing.     PMH:  Patient Active Problem List    Diagnosis Date Noted    Abnormal PFT 04/29/2024     Priority: Medium     PFT 4/23/2024 showed possible restrictive process, needs to be confirmed with lung volume, and extra thoracic obstruction.      Impaired gait 04/22/2024     Priority: Medium     Uses walker.      Neck pain 04/22/2024     Priority: Medium    Decreased appetite 04/22/2024     Priority: Medium     Weight stable, CMP normal. Recently started on mirtazapine, unsure if helping, will continue.      Chronic pain of left knee 12/27/2022     Priority: Medium     Stable on the Tylenol 3's.  This does help his pain when he takes it.  Urine drug screen ordered today for ongoing opioid use      Prediabetes 10/06/2022     Priority: Medium     new diagnosis. will work on diet and exercise changes. consider seeing diabetic educator      Other eczema 10/06/2022     Priority: Medium     stable. refill      Dizziness 10/06/2022     Priority: Medium     chronic issue. Stable on meclizine.       Betel deposit on teeth 06/08/2022     Priority: Medium    Poor dentition 02/19/2020     Priority: Medium    Mild single current episode of major depressive disorder (H24) 07/20/2018     Priority: Medium    Chronic bilateral low back pain without sciatica 07/19/2017     Priority: Medium    Memory difficulty 07/19/2017     Priority: Medium    Incontinence 12/13/2016     Priority: Medium    SNHL (sensorineural hearing loss) 09/02/2016     Priority: Medium    Soft tissue mass 08/15/2016     Priority: Medium    Insomnia  "07/08/2016     Priority: Medium    Multiple joint pain 07/08/2016     Priority: Medium     PSH:  Past Surgical History:   Procedure Laterality Date    FOOT SURGERY Left      Current Meds:  Current Outpatient Medications   Medication Sig Dispense Refill    acetaminophen (TYLENOL) 500 MG tablet Take 1-2 tablets (500-1,000 mg) by mouth every 8 hours as needed for mild pain 90 tablet 3    albuterol (PROAIR HFA/PROVENTIL HFA/VENTOLIN HFA) 108 (90 Base) MCG/ACT inhaler Inhale 2 puffs into the lungs every 6 hours as needed for shortness of breath, wheezing or cough 18 g 0    meclizine (ANTIVERT) 25 MG tablet Take 1 tablet (25 mg) by mouth 3 times daily as needed for dizziness 90 tablet 2    mirtazapine (REMERON) 15 MG tablet Take 1 tablet (15 mg) by mouth at bedtime 30 tablet 3    Multiple Vitamin (MULTI VITAMIN) TABS Take 1 tablet by mouth daily 90 tablet 3    multivitamin (ONE-DAILY) tablet Take 1 tablet by mouth daily 30 tablet 11    olopatadine (PATANOL) 0.1 % ophthalmic solution Place 1 drop into both eyes 2 times daily 10 mL 1    ramelteon (ROZEREM) 8 MG tablet Take 1 tablet (8 mg) by mouth at bedtime 90 tablet 3     No current facility-administered medications for this visit.     Allergies:  No Known Allergies    Social Hx:  Social History     Tobacco Use    Smoking status: Former     Types: Cigarettes    Smokeless tobacco: Current     Types: Chew    Tobacco comments:     betel nut   Vaping Use    Vaping status: Never Used   Substance Use Topics    Alcohol use: No    Drug use: No     Family HX: family history is not on file.    ROS:   10-point review performed and notable for the above mentioned symptoms. The remainder reviewed and negative.     Physical Exam:  /71 (BP Location: Left arm, Patient Position: Sitting, Cuff Size: Adult Small)   Pulse 60   Ht 1.53 m (5' 0.25\")   Wt 51.6 kg (113 lb 12.8 oz)   SpO2 99%   BMI 22.04 kg/m      Physical Exam  Constitutional:       General: He is not in acute " distress.     Appearance: He is not ill-appearing or diaphoretic.   Cardiovascular:      Rate and Rhythm: Normal rate and regular rhythm.      Heart sounds: Normal heart sounds.   Pulmonary:      Effort: Pulmonary effort is normal. No respiratory distress.      Breath sounds: No wheezing or rhonchi.   Musculoskeletal:      Right lower leg: No edema.      Left lower leg: No edema.   Neurological:      Mental Status: He is alert.   Psychiatric:         Behavior: Behavior normal.         PFT's     4/23/2024      FEV1 and FVC are both reduced. FEV1/FVC ratio 73.   FV loop with low flows and the inspiratory limb appears to show flattening.     IMPRESSION:   Sprimetry is suggestive of restiction process but should be confirmed by measurement of lung volumes.   Clinical correlation need for evaluation of the flattening of insp limb which could indicate extrathroacic obstruction.   FEV1 and FVC are both reduced. FEV1/FVC ratio 73.   FV loop with low flows and the inspiratory limb appears to show flattening.   IMPRESSION:   Sprimetry is suggestive of restiction process but should be confirmed by measurement of lung volumes.   Clinical correlation need for evaluation of the flattening of insp limb which could indicate extrathroacic obstruction.     Labs:   personally reviewed in the EMR.   Latest Reference Range & Units 04/01/24 14:43   Absolute Eosinophils 0.0 - 0.7 10e3/uL 0.4     Imaging studies: personally reviewed and interpreted. Below are the Radiology interpretations.    XR CHEST 2 VIEWS, DATE: 4/1/2024  INDICATION:  Aspiration of food, initial encounter.  COMPARISON: 02/22/2016                                                                IMPRESSION: Negative chest.    XR VIDEO SWALLOW WITH SLP OR OT, DATE: 5/3/2024  INDICATION: Difficulty swallowing.  COMPARISON: None.  FINDINGS:   Swallow study with Speech Pathology using multiple barium thicknesses.   No aspiration or penetration visualized with trialed  consistencies.                                                                IMPRESSION:  No aspiration or penetration visualized with trialed consistencies. Please see speech pathology report for further details and recommendations.

## 2024-07-09 NOTE — PATIENT INSTRUCTIONS
It was a pleasure seeing you in clinic today. This is what we discussed:    START the new inhaler. Use this every day no matter what.   Use your albuterol every 4 hours as needed for cough, shortness of breath, wheeze, or chest tightness.   Follow up 2 months   If you have worsening symptoms, questions, or need to speak with the nurse please call [phone].

## 2024-07-22 ENCOUNTER — OFFICE VISIT (OUTPATIENT)
Dept: FAMILY MEDICINE | Facility: CLINIC | Age: 84
End: 2024-07-22
Payer: COMMERCIAL

## 2024-07-22 VITALS
HEART RATE: 70 BPM | HEIGHT: 60 IN | RESPIRATION RATE: 16 BRPM | WEIGHT: 115 LBS | DIASTOLIC BLOOD PRESSURE: 62 MMHG | TEMPERATURE: 97.3 F | SYSTOLIC BLOOD PRESSURE: 97 MMHG | OXYGEN SATURATION: 97 % | BODY MASS INDEX: 22.58 KG/M2

## 2024-07-22 DIAGNOSIS — G89.29 CHRONIC BILATERAL LOW BACK PAIN WITHOUT SCIATICA: ICD-10-CM

## 2024-07-22 DIAGNOSIS — M54.50 CHRONIC BILATERAL LOW BACK PAIN WITHOUT SCIATICA: ICD-10-CM

## 2024-07-22 DIAGNOSIS — R13.10 DYSPHAGIA, UNSPECIFIED TYPE: ICD-10-CM

## 2024-07-22 DIAGNOSIS — R63.0 POOR APPETITE: ICD-10-CM

## 2024-07-22 DIAGNOSIS — Z23 NEED FOR COVID-19 VACCINE: ICD-10-CM

## 2024-07-22 DIAGNOSIS — R21 RASH: ICD-10-CM

## 2024-07-22 DIAGNOSIS — R42 DIZZINESS: ICD-10-CM

## 2024-07-22 DIAGNOSIS — R94.2 ABNORMAL PFT: ICD-10-CM

## 2024-07-22 DIAGNOSIS — F32.0 MILD SINGLE CURRENT EPISODE OF MAJOR DEPRESSIVE DISORDER (H): ICD-10-CM

## 2024-07-22 DIAGNOSIS — R06.2 WHEEZING: Primary | ICD-10-CM

## 2024-07-22 PROCEDURE — 90480 ADMN SARSCOV2 VAC 1/ONLY CMP: CPT | Performed by: STUDENT IN AN ORGANIZED HEALTH CARE EDUCATION/TRAINING PROGRAM

## 2024-07-22 PROCEDURE — T1013 SIGN LANG/ORAL INTERPRETER: HCPCS | Mod: U4

## 2024-07-22 PROCEDURE — 99214 OFFICE O/P EST MOD 30 MIN: CPT | Mod: 25 | Performed by: STUDENT IN AN ORGANIZED HEALTH CARE EDUCATION/TRAINING PROGRAM

## 2024-07-22 PROCEDURE — 91320 SARSCV2 VAC 30MCG TRS-SUC IM: CPT | Performed by: STUDENT IN AN ORGANIZED HEALTH CARE EDUCATION/TRAINING PROGRAM

## 2024-07-22 RX ORDER — MIRTAZAPINE 15 MG/1
15 TABLET, FILM COATED ORAL AT BEDTIME
Qty: 90 TABLET | Refills: 3 | Status: SHIPPED | OUTPATIENT
Start: 2024-07-22

## 2024-07-22 RX ORDER — MECLIZINE HYDROCHLORIDE 25 MG/1
25 TABLET ORAL 3 TIMES DAILY PRN
Qty: 90 TABLET | Refills: 2 | Status: SHIPPED | OUTPATIENT
Start: 2024-07-22

## 2024-07-22 RX ORDER — TRIAMCINOLONE ACETONIDE 1 MG/G
OINTMENT TOPICAL 2 TIMES DAILY
Qty: 80 G | Refills: 0 | Status: SHIPPED | OUTPATIENT
Start: 2024-07-22

## 2024-07-22 ASSESSMENT — PATIENT HEALTH QUESTIONNAIRE - PHQ9
SUM OF ALL RESPONSES TO PHQ QUESTIONS 1-9: 5
SUM OF ALL RESPONSES TO PHQ QUESTIONS 1-9: 5
10. IF YOU CHECKED OFF ANY PROBLEMS, HOW DIFFICULT HAVE THESE PROBLEMS MADE IT FOR YOU TO DO YOUR WORK, TAKE CARE OF THINGS AT HOME, OR GET ALONG WITH OTHER PEOPLE: NOT DIFFICULT AT ALL

## 2024-07-22 NOTE — PROGRESS NOTES
Wheezing  Abnormal PFT  Pulm 7/6/24:***  Has noticed wheeze and chest tightness over the past year. Albuterol has been occasionally helpful. Denies other breathing symptoms but family reports they feel he is occasionally aspirating or having swallow difficulty.      PFTs should be interpreted with caution as he had difficulty doing the test but there does appear to be restriction with a flattened inspiratory limb.   VSS unremarkable. Recent CXR normal.   Lung exam and SpO2 are normal today.      #. Restriction on PFTs, difficulty swallowing: Given decreased FEV1 and FVC with flattened inspiratory limb we will investigate extrathoracic cause for symptoms.    ENT referral - appt yet ***  Hi-res CT chest - 7/22/24***  #. Wheeze: As we have not tried a ICS yet we will do so to eliminate any possible underlying asthma. Unlikely given normal ratio of PFTs but the test may not be accurate and he has found albuterol helpful.   Symbicort (or equivalent ICS/LABA), as prescribed. Use daily no matter what for at least 1 month. If helpful continue use.   Albuterol prn   #. HCM: UTD with COVID vaccine, seasonal flu, PCV 13, and PPSV 23        RTC in 2 months for inhaler recheck     Lisa Sun, BRYANT    Dysphagia  Swallow Study 5/30/24:***  Pt appears to be at low aspiration risk, however, this risk is increased with impulsive eating habits such as large bites/sips and fast pace (which pt's granddaughter describes happens at home). They were receptive to education and strategy training.     Chronic bilateral low back pain without sciatica  Previously seen by physical medicine in 2019, MRI completed, per physical medicine: MRI which does show lumbar scoliosis and facet arthritis as well as multilevel disc height loss with nerve compression, no concerning findings however. Recommend following up with physical therapy, if symptoms are not improving follow-up in clinic to discuss injection options.   Appears the patient completed  PT  -     Spine  Referral; Future     Neck pain  Comments:  XR showed arthritis.  -Refer to spine  -Continue Tylenol as needed

## 2024-07-22 NOTE — Clinical Note
Johan Gonzalez,   I saw patient today.  His high-resolution CT is scheduled for tomorrow, however, patient reported that he was told something was going to go in his nose and he does not want to do the study. He also has not been using his Symbicort twice daily, so I encouraged him to give this a good trial.  Thanks,  Tricia Beach

## 2024-07-22 NOTE — PROGRESS NOTES
Teodoro was seen today for office visit.    Diagnoses and all orders for this visit:    Wheezing  Abnormal PFT  Comments:  Has not been using Symbicort twice daily.  Discussed importance of completing a trial.  Patient will try again.  High-resolution CT was also ordered by pulmonology, however patient reports that he was told something will need to go in his nose, and does not want to do the study.  Will inform pulmonology.  - Follow-up with pulmonology.    Dysphagia, unspecified type  Comments:  Improved.  Swallow study was normal, recommended slower pace and smaller bites.  Refer to ENT by pulmonology, however, patient declined.    Poor appetite  Comments:  improved on mirtazapine.  Continue.  Orders:  -     mirtazapine (REMERON) 15 MG tablet; Take 1 tablet (15 mg) by mouth at bedtime    Mild single current episode of major depressive disorder (H24)  Comments:  stable. continue mirtazapine.   Orders:  -     mirtazapine (REMERON) 15 MG tablet; Take 1 tablet (15 mg) by mouth at bedtime    Dizziness  Comments:  chronic issue. Stable on meclizine.  reports neck pain-> dizziness. i think its probably tension HA related. recommend heat, massage. no carotid bruits noted.   Orders:  -     mirtazapine (REMERON) 15 MG tablet; Take 1 tablet (15 mg) by mouth at bedtime  -     meclizine (ANTIVERT) 25 MG tablet; Take 1 tablet (25 mg) by mouth 3 times daily as needed for dizziness    Need for COVID-19 vaccine  -     COVID-19 12+ (2023-24) (PFIZER)    Rash  Comments:  On back of neck, suspect dermatitis.  Discussed avoiding possible triggers.  Steroid cream, discussed risks.  Orders:  -     triamcinolone (KENALOG) 0.1 % external ointment; Apply topically 2 times daily    Chronic bilateral low back pain without sciatica  Comments:  Chronic, stable.  Refer to spine, did not make appointment.  Will have TC help.      The longitudinal plan of care for the diagnosis(es)/condition(s) as documented were addressed during this visit. Due  to the added complexity in care, I will continue to support Teodoro in the subsequent management and with ongoing continuity of care.        Subjective   Teodoro is a 84 year old, presenting for the following health issues:  office visit (Pt wants to cancel Ct chest appointment for tomorrow.rash on neck)        7/22/2024     1:37 PM   Additional Questions   Roomed by    Accompanied by PCA     History of Present Illness       Reason for visit:  3 months follow up    He eats 0-1 servings of fruits and vegetables daily.He consumes 0 sweetened beverage(s) daily.He exercises with enough effort to increase his heart rate 10 to 19 minutes per day.  He exercises with enough effort to increase his heart rate 4 days per week.   He is taking medications regularly.       Wheezing  Abnormal PFT  Pulm 7/6/24 note reviewed:  - High res CT scheduled for tomorrow, however, pt was told it would require something going into his nose and he does not want to do it.   - reports that symbicort doesn't help and has not been using it. He only wants to use albuterol.   - does not want to see ENT     Dysphagia  Swallow Study 5/30/24:  Pt appears to be at low aspiration risk, however, this risk is increased with impulsive eating habits such as large bites/sips and fast pace (which pt's granddaughter describes happens at home). They were receptive to education and strategy training.   - daughter reports that coughing with eating has resolved.     Chronic bilateral low back pain without sciatica  Previously seen by physical medicine in 2019, MRI completed, per physical medicine: MRI which does show lumbar scoliosis and facet arthritis as well as multilevel disc height loss with nerve compression, no concerning findings however. Recommend following up with physical therapy, if symptoms are not improving follow-up in clinic to discuss injection options.   Appears the patient completed PT  -     Spine  Referral; Future     Neck  "pain  Comments:  XR showed arthritis.  -Refer to spine  -Continue Tylenol as needed        Objective    BP 97/62 (BP Location: Left arm, Patient Position: Sitting, Cuff Size: Adult Regular)   Pulse 70   Temp 97.3  F (36.3  C) (Temporal)   Resp 16   Ht 1.53 m (5' 0.24\")   Wt 52.2 kg (115 lb)   SpO2 97%   BMI 22.28 kg/m    Body mass index is 22.28 kg/m .  Physical Exam   General: Well developed, well nourished.  Skin: Dry scaly rash on back of neck.  Head:  Normocephalic-atraumatic.    Eye:  Normal conjunctivae.     Respiratory:  Normal respiratory effort.   Gastrointestinal:  Non-distended.    Musculoskeletal:  No deformity or edema.  Neurologic: No focal deficits.          Signed Electronically by: Tricia Cohn MD    Prior to immunization administration, verified patients identity using patient s name and date of birth. Please see Immunization Activity for additional information.     Screening Questionnaire for Adult Immunization    Are you sick today?   No   Do you have allergies to medications, food, a vaccine component or latex?   No   Have you ever had a serious reaction after receiving a vaccination?   No   Do you have a long-term health problem with heart, lung, kidney, or metabolic disease (e.g., diabetes), asthma, a blood disorder, no spleen, complement component deficiency, a cochlear implant, or a spinal fluid leak?  Are you on long-term aspirin therapy?   No   Do you have cancer, leukemia, HIV/AIDS, or any other immune system problem?   No   Do you have a parent, brother, or sister with an immune system problem?   No   In the past 3 months, have you taken medications that affect  your immune system, such as prednisone, other steroids, or anticancer drugs; drugs for the treatment of rheumatoid arthritis, Crohn s disease, or psoriasis; or have you had radiation treatments?   No   Have you had a seizure, or a brain or other nervous system problem?   No   During the past year, have you received a " transfusion of blood or blood    products, or been given immune (gamma) globulin or antiviral drug?   No   For women: Are you pregnant or is there a chance you could become       pregnant during the next month?   No   Have you received any vaccinations in the past 4 weeks?   No     Immunization questionnaire answers were all negative.      Patient instructed to remain in clinic for 15 minutes afterwards, and to report any adverse reactions.     Screening performed by Lev Boo MA on 7/22/2024 at 1:43 PM.

## 2024-07-24 ENCOUNTER — TELEPHONE (OUTPATIENT)
Dept: PULMONOLOGY | Facility: CLINIC | Age: 84
End: 2024-07-24
Payer: COMMERCIAL

## 2024-07-24 ENCOUNTER — VIRTUAL VISIT (OUTPATIENT)
Dept: INTERPRETER SERVICES | Facility: CLINIC | Age: 84
End: 2024-07-24
Payer: COMMERCIAL

## 2024-07-24 NOTE — TELEPHONE ENCOUNTER
Per Lisa Sun CNP:  Can you call and assure him there will be nothing in his nose during the CT scan. Also remind him to use the inhaler twice daily no matter what. Thanks!      Using  services via telephone, called and spoke with Teodoro/family. Informed him that the CT will not have anything to do with his nose. They thought it was for the ENT consult. They do not want to have the ENT consult at this time. Gave them phone number to schedule CT. They will call today. Reminded him to use his Symbicort. He informs he does not like to use it everyday but does use his rescue inhaler. Asked him if the Symbicort was causing any side effect. He responded No. He just does not like using it everyday. Family will remind him to use this. No action required. KELLY only.

## 2024-08-09 ENCOUNTER — OFFICE VISIT (OUTPATIENT)
Dept: PHYSICAL MEDICINE AND REHAB | Facility: CLINIC | Age: 84
End: 2024-08-09
Attending: STUDENT IN AN ORGANIZED HEALTH CARE EDUCATION/TRAINING PROGRAM
Payer: COMMERCIAL

## 2024-08-09 VITALS
HEIGHT: 60 IN | BODY MASS INDEX: 22.58 KG/M2 | HEART RATE: 63 BPM | DIASTOLIC BLOOD PRESSURE: 57 MMHG | WEIGHT: 115 LBS | SYSTOLIC BLOOD PRESSURE: 117 MMHG

## 2024-08-09 DIAGNOSIS — G89.29 CHRONIC BILATERAL LOW BACK PAIN WITHOUT SCIATICA: ICD-10-CM

## 2024-08-09 DIAGNOSIS — M47.816 LUMBAR SPONDYLOSIS: ICD-10-CM

## 2024-08-09 DIAGNOSIS — M51.369 LUMBAR DEGENERATIVE DISC DISEASE: Primary | ICD-10-CM

## 2024-08-09 DIAGNOSIS — M54.50 CHRONIC BILATERAL LOW BACK PAIN WITHOUT SCIATICA: ICD-10-CM

## 2024-08-09 PROCEDURE — T1013 SIGN LANG/ORAL INTERPRETER: HCPCS | Mod: U4

## 2024-08-09 PROCEDURE — 99204 OFFICE O/P NEW MOD 45 MIN: CPT | Performed by: STUDENT IN AN ORGANIZED HEALTH CARE EDUCATION/TRAINING PROGRAM

## 2024-08-09 ASSESSMENT — PAIN SCALES - GENERAL: PAINLEVEL: MODERATE PAIN (5)

## 2024-08-09 NOTE — PROGRESS NOTES
Entire visit was conducted with the use of a live audio , Anamika fajardo,  ID FV [id number].    ASSESSMENT:  Teodoro Gregorio is a 84 year old male presents for consultation at the request of Tricia Cohn who presents today for new patient evaluation of :         Diagnoses and all orders for this visit:  Lumbar degenerative disc disease  -     XR Lumbar Spine G/E 4 Views; Future  -     MR Lumbar Spine w/o Contrast; Future  Chronic bilateral low back pain without sciatica  -     Spine  Referral  -     XR Lumbar Spine G/E 4 Views; Future  -     MR Lumbar Spine w/o Contrast; Future  Lumbar spondylosis  -     XR Lumbar Spine G/E 4 Views; Future  -     MR Lumbar Spine w/o Contrast; Future       Patient is neurologically intact on exam. No myelopathic or red flag symptoms.    Patient is an 84-year-old male with history of chronic low back pain who presents for evaluation of axial bilateral low back pain without any radicular component.  Exam is benign though patient appears functionally quite limited due to the severity of back pain.  Patient has completed physical therapy and has been trying the home exercises as tolerated but has not provided significant benefit.  Patient and I discussed that since it has been close to 5 years since any imaging was done we will start with x-ray and MRI and plan to follow-up once imaging is completed to discuss treatment options.  Patient in agreement with this plan, all questions were addressed.    PLAN:  Reviewed spine anatomy and disease process. Discussed diagnosis and treatment options with the patient today. A shared decision making model was used. The patient's values and choices were respected. The following represents what was discussed and decided upon by the provider and the patient.    1. DIAGNOSTIC TESTS  X-ray of lumbar spine with bending views was ordered for further evaluation  MRI of lumbar spine was ordered for further characterization of soft tissues  and/or neural compression    2. PHYSICAL THERAPY  Patient is already performing a home program, and was encouraged to continue doing so.  Patient previously completed PT and reports it was not beneficial  Discussed the importance of core strengthening, ROM, stretching exercises with the patient and how each of these entities is important in decreasing pain.  Explained to the patient that the purpose of physical therapy is to teach the patient a home exercise program.  These exercises need to be performed every day in order to decrease pain and prevent future occurrences of pain.  Likened it to brushing one's teeth.      3. MEDICATIONS:  Discussed multiple medication options today with patient. Discussed risks, side effects, and proper use of medications. Patient verbalized understanding.  No new medications ordered at this visit.    4. INTERVENTIONS:  Patient requires further imaging to assess what interventional options may be best for them.    5. OTHER REFERRALS:  No other referrals at this time.    6. FOLLOW-UP  To review imaging results once imaging is completed  Patient advised to contact our office for earlier appointment if symptoms worsening or not improving, or any side effects are noticed.    Advised patient to call the Spine Center if symptoms worsen or you have problems controlling bladder and bowel function.   ______________________________________________________________________    SUBJECTIVE:   Teodoro Gregorio  is a 84 year old male who presents today for new patient evaluation.  Patient is accompanied by his adult daughter who helped provide collateral information.    Patient reports low back pain which is in a bandlike distribution across the mid to lower lumbar segments.  Symptoms worsen with any prolonged standing or sitting, and patient reports that walking is also difficult due to the severity of pain.  Patient has been using a walker to walk any long distances as his cane is not sufficient anymore to  help support him when he has back pain.  Difficult to elucidate further details about the pain despite asking multiple times and patient's daughter even asking as well.    Patient responded affirmatively that pain does travel down into the legs, but then when asked where the pain travels to patient only showed his low back.  Despite attempts to clarify what location was being asked, patient unable to describe what area of his lower extremities are affected by radiating symptoms.  Patient reports he does also feel numbness and tingling in his lower extremities but unable to specify where.  Denies any bladder or bowel incontinence.    No prior back surgeries    -Treatment to Date: Previously tried PT which caused worsening pain      Oswestry (DOMINIC) Questionnaire         No data to display                Neck Disability Index:       No data to display                       -Medications:    Current Outpatient Medications   Medication Sig Dispense Refill    acetaminophen (TYLENOL) 500 MG tablet Take 1-2 tablets (500-1,000 mg) by mouth every 8 hours as needed for mild pain 90 tablet 3    albuterol (PROAIR HFA/PROVENTIL HFA/VENTOLIN HFA) 108 (90 Base) MCG/ACT inhaler Inhale 2 puffs into the lungs every 6 hours as needed for shortness of breath, wheezing or cough 18 g 0    budesonide-formoterol (SYMBICORT) 160-4.5 MCG/ACT Inhaler Inhale 2 puffs into the lungs 2 times daily 10.2 g 11    meclizine (ANTIVERT) 25 MG tablet Take 1 tablet (25 mg) by mouth 3 times daily as needed for dizziness 90 tablet 2    mirtazapine (REMERON) 15 MG tablet Take 1 tablet (15 mg) by mouth at bedtime 90 tablet 3    Multiple Vitamin (MULTI VITAMIN) TABS Take 1 tablet by mouth daily 90 tablet 3    multivitamin (ONE-DAILY) tablet Take 1 tablet by mouth daily 30 tablet 11    olopatadine (PATANOL) 0.1 % ophthalmic solution Place 1 drop into both eyes 2 times daily 10 mL 1    ramelteon (ROZEREM) 8 MG tablet Take 1 tablet (8 mg) by mouth at bedtime 90  "tablet 3    triamcinolone (KENALOG) 0.1 % external ointment Apply topically 2 times daily 80 g 0     No current facility-administered medications for this visit.       No Known Allergies    Past Medical History:   Diagnosis Date    Anemia, unspecified type 10/6/2022    recheck labs today to see if any improvement from June. unknown cause at this point.     Constipation     Hearing loss     Insomnia     Loss of appetite 7/20/2018    Multiple joint pain     TB lung, latent         Patient Active Problem List   Diagnosis    Insomnia    Multiple joint pain    Soft tissue mass    SNHL (sensorineural hearing loss)    Incontinence    Chronic bilateral low back pain without sciatica    Memory difficulty    Mild single current episode of major depressive disorder (H24)    Poor dentition    Betel deposit on teeth    Prediabetes    Other eczema    Dizziness    Chronic pain of left knee    Impaired gait    Neck pain    Decreased appetite    Abnormal PFT       Past Surgical History:   Procedure Laterality Date    FOOT SURGERY Left        No family history on file.    Reviewed past medical, surgical, and family history with patient found on new patient intake packet located in EMR Media tab.     SOCIAL HX: Reviewed    ROS: Specifically negative for bowel/bladder dysfunction, balance changes, headache, dizziness, foot drop, fevers, chills, appetite changes, nausea/vomiting, unexplained weight loss. Otherwise 13 systems reviewed are negative. Please see the patient's intake questionnaire from today for details.    OBJECTIVE:  /57   Pulse 63   Ht 5' 0.24\" (1.53 m)   Wt 115 lb (52.2 kg)   BMI 22.28 kg/m      PHYSICAL EXAMINATION:  --CONSTITUTIONAL: Vital signs as above. No acute distress. The patient is well nourished and well groomed.  --PSYCHIATRIC: The patient is awake, alert, oriented to person, place, time and answering questions appropriately with clear speech. Appropriate mood and affect   --RESPIRATORY: Normal " rhythm and effort. No abnormal accessory muscle breathing patterns noted.   --GROSS MOTOR: Difficulty standing from a seated position due to pain.  Patient has significant thoracic kyphosis when standing.  --LUMBAR SPINE: Inspection reveals kyphoscoliotic deformity. Range of motion is limited in all planes. Mild tenderness to palpation of lumbar paraspinals, no tenderness in spinous processes.  Facet loading (Ng test) could not be performed as patient is very limited in lumbar extension.  Seated straight leg raise was negative bilaterally.  --HIPS: Full range of motion bilaterally.  --LOWER EXTREMITY MOTOR TESTING:  Hip flexion left 5/5, right 5/5  Knee extension left 5/5, right 5/5  Ankle dorsiflexors left 5/5, right 5/5  Ankle plantarflexors left 5/5, right 5/5   Great toe extension left 5/5, right 5/5   Knee flexion left 5/5, right 5/5  --NEUROLOGIC: Sensation to lower extremities is intact bilaterally in L2-S1 dermatomes.  Negative Melo's bilaterally. Reflexes intact in BLE, no clonus.  --VASCULAR: Warm upper limbs bilaterally. Capillary refill in the upper extremities is less than 1 second.    RESULTS: Available medical records from [facility] and any other outside records were reviewed today.     Imaging:  Available relevant imaging was personally reviewed and interpreted today. The images were shown to the patient and the findings were explained using a spine model.    8/25/2019 MRI lumbar spine   IMPRESSION:  CONCLUSION:  1.  Moderate lumbar spondylosis with S-shaped lumbar rotary scoliosis and mild focal kyphosis centered at the L1-L2 level.      2.  Broad-based disc osteophyte complexes at the L2-L3 and more caudal levels contribute to mild bilateral L2-L3, moderate bilateral L3-L4, moderate to severe right and moderate left L4-L5, and moderate to severe right and mild left L5-S1 neural   foraminal stenosis.      3.  Mild right lateral recess narrowing and mild spinal canal stenosis at L4-L5.       4.  Multiple bilateral simple appearing renal cysts are seen. Within the ventral aspect of the left kidney is a 6 mm focus of low signal on T2-weighted images, nonspecific but favored to reflect a small hemorrhagic cyst.

## 2024-08-09 NOTE — LETTER
8/9/2024      Teodoro Gregorio  [address]  [place] [address]      Dear Colleague,    Thank you for referring your patient, Teodoro Gregorio, to the [facility]. Please see a copy of my visit note below.    Entire visit was conducted with the use of a live audio , Anamika fajardo,  ID FV [id number].    ASSESSMENT:  Teodoro Gregorio is a 84 year old male presents for consultation at the request of Tricia Cohn who presents today for new patient evaluation of :         Diagnoses and all orders for this visit:  Lumbar degenerative disc disease  -     XR Lumbar Spine G/E 4 Views; Future  -     MR Lumbar Spine w/o Contrast; Future  Chronic bilateral low back pain without sciatica  -     Spine  Referral  -     XR Lumbar Spine G/E 4 Views; Future  -     MR Lumbar Spine w/o Contrast; Future  Lumbar spondylosis  -     XR Lumbar Spine G/E 4 Views; Future  -     MR Lumbar Spine w/o Contrast; Future       Patient is neurologically intact on exam. No myelopathic or red flag symptoms.    Patient is an 84-year-old male with history of chronic low back pain who presents for evaluation of axial bilateral low back pain without any radicular component.  Exam is benign though patient appears functionally quite limited due to the severity of back pain.  Patient has completed physical therapy and has been trying the home exercises as tolerated but has not provided significant benefit.  Patient and I discussed that since it has been close to 5 years since any imaging was done we will start with x-ray and MRI and plan to follow-up once imaging is completed to discuss treatment options.  Patient in agreement with this plan, all questions were addressed.    PLAN:  Reviewed spine anatomy and disease process. Discussed diagnosis and treatment options with the patient today. A shared decision making model was used. The patient's values and choices were respected. The following represents what was discussed  and decided upon by the provider and the patient.    1. DIAGNOSTIC TESTS  X-ray of lumbar spine with bending views was ordered for further evaluation  MRI of lumbar spine was ordered for further characterization of soft tissues and/or neural compression    2. PHYSICAL THERAPY  Patient is already performing a home program, and was encouraged to continue doing so.  Patient previously completed PT and reports it was not beneficial  Discussed the importance of core strengthening, ROM, stretching exercises with the patient and how each of these entities is important in decreasing pain.  Explained to the patient that the purpose of physical therapy is to teach the patient a home exercise program.  These exercises need to be performed every day in order to decrease pain and prevent future occurrences of pain.  Likened it to brushing one's teeth.      3. MEDICATIONS:  Discussed multiple medication options today with patient. Discussed risks, side effects, and proper use of medications. Patient verbalized understanding.  No new medications ordered at this visit.    4. INTERVENTIONS:  Patient requires further imaging to assess what interventional options may be best for them.    5. OTHER REFERRALS:  No other referrals at this time.    6. FOLLOW-UP  To review imaging results once imaging is completed  Patient advised to contact our office for earlier appointment if symptoms worsening or not improving, or any side effects are noticed.    Advised patient to call the Spine Center if symptoms worsen or you have problems controlling bladder and bowel function.   ______________________________________________________________________    SUBJECTIVE:   Teodoro Gregorio  is a 84 year old male who presents today for new patient evaluation.  Patient is accompanied by his adult daughter who helped provide collateral information.    Patient reports low back pain which is in a bandlike distribution across the mid to lower lumbar segments.  Symptoms  worsen with any prolonged standing or sitting, and patient reports that walking is also difficult due to the severity of pain.  Patient has been using a walker to walk any long distances as his cane is not sufficient anymore to help support him when he has back pain.  Difficult to elucidate further details about the pain despite asking multiple times and patient's daughter even asking as well.    Patient responded affirmatively that pain does travel down into the legs, but then when asked where the pain travels to patient only showed his low back.  Despite attempts to clarify what location was being asked, patient unable to describe what area of his lower extremities are affected by radiating symptoms.  Patient reports he does also feel numbness and tingling in his lower extremities but unable to specify where.  Denies any bladder or bowel incontinence.    No prior back surgeries    -Treatment to Date: Previously tried PT which caused worsening pain      Oswestry (DOMINIC) Questionnaire         No data to display                Neck Disability Index:       No data to display                       -Medications:    Current Outpatient Medications   Medication Sig Dispense Refill     acetaminophen (TYLENOL) 500 MG tablet Take 1-2 tablets (500-1,000 mg) by mouth every 8 hours as needed for mild pain 90 tablet 3     albuterol (PROAIR HFA/PROVENTIL HFA/VENTOLIN HFA) 108 (90 Base) MCG/ACT inhaler Inhale 2 puffs into the lungs every 6 hours as needed for shortness of breath, wheezing or cough 18 g 0     budesonide-formoterol (SYMBICORT) 160-4.5 MCG/ACT Inhaler Inhale 2 puffs into the lungs 2 times daily 10.2 g 11     meclizine (ANTIVERT) 25 MG tablet Take 1 tablet (25 mg) by mouth 3 times daily as needed for dizziness 90 tablet 2     mirtazapine (REMERON) 15 MG tablet Take 1 tablet (15 mg) by mouth at bedtime 90 tablet 3     Multiple Vitamin (MULTI VITAMIN) TABS Take 1 tablet by mouth daily 90 tablet 3     multivitamin  "(ONE-DAILY) tablet Take 1 tablet by mouth daily 30 tablet 11     olopatadine (PATANOL) 0.1 % ophthalmic solution Place 1 drop into both eyes 2 times daily 10 mL 1     ramelteon (ROZEREM) 8 MG tablet Take 1 tablet (8 mg) by mouth at bedtime 90 tablet 3     triamcinolone (KENALOG) 0.1 % external ointment Apply topically 2 times daily 80 g 0     No current facility-administered medications for this visit.       No Known Allergies    Past Medical History:   Diagnosis Date     Anemia, unspecified type 10/6/2022    recheck labs today to see if any improvement from June. unknown cause at this point.      Constipation      Hearing loss      Insomnia      Loss of appetite 7/20/2018     Multiple joint pain      TB lung, latent         Patient Active Problem List   Diagnosis     Insomnia     Multiple joint pain     Soft tissue mass     SNHL (sensorineural hearing loss)     Incontinence     Chronic bilateral low back pain without sciatica     Memory difficulty     Mild single current episode of major depressive disorder (H24)     Poor dentition     Betel deposit on teeth     Prediabetes     Other eczema     Dizziness     Chronic pain of left knee     Impaired gait     Neck pain     Decreased appetite     Abnormal PFT       Past Surgical History:   Procedure Laterality Date     FOOT SURGERY Left        No family history on file.    Reviewed past medical, surgical, and family history with patient found on new patient intake packet located in EMR Media tab.     SOCIAL HX: Reviewed    ROS: Specifically negative for bowel/bladder dysfunction, balance changes, headache, dizziness, foot drop, fevers, chills, appetite changes, nausea/vomiting, unexplained weight loss. Otherwise 13 systems reviewed are negative. Please see the patient's intake questionnaire from today for details.    OBJECTIVE:  /57   Pulse 63   Ht 5' 0.24\" (1.53 m)   Wt 115 lb (52.2 kg)   BMI 22.28 kg/m      PHYSICAL EXAMINATION:  --CONSTITUTIONAL: Vital " signs as above. No acute distress. The patient is well nourished and well groomed.  --PSYCHIATRIC: The patient is awake, alert, oriented to person, place, time and answering questions appropriately with clear speech. Appropriate mood and affect   --RESPIRATORY: Normal rhythm and effort. No abnormal accessory muscle breathing patterns noted.   --GROSS MOTOR: Difficulty standing from a seated position due to pain.  Patient has significant thoracic kyphosis when standing.  --LUMBAR SPINE: Inspection reveals kyphoscoliotic deformity. Range of motion is limited in all planes. Mild tenderness to palpation of lumbar paraspinals, no tenderness in spinous processes.  Facet loading (Ng test) could not be performed as patient is very limited in lumbar extension.  Seated straight leg raise was negative bilaterally.  --HIPS: Full range of motion bilaterally.  --LOWER EXTREMITY MOTOR TESTING:  Hip flexion left 5/5, right 5/5  Knee extension left 5/5, right 5/5  Ankle dorsiflexors left 5/5, right 5/5  Ankle plantarflexors left 5/5, right 5/5   Great toe extension left 5/5, right 5/5   Knee flexion left 5/5, right 5/5  --NEUROLOGIC: Sensation to lower extremities is intact bilaterally in L2-S1 dermatomes.  Negative Melo's bilaterally. Reflexes intact in BLE, no clonus.  --VASCULAR: Warm upper limbs bilaterally. Capillary refill in the upper extremities is less than 1 second.    RESULTS: Available medical records from [facility] and any other outside records were reviewed today.     Imaging:  Available relevant imaging was personally reviewed and interpreted today. The images were shown to the patient and the findings were explained using a spine model.    8/25/2019 MRI lumbar spine   IMPRESSION:  CONCLUSION:  1.  Moderate lumbar spondylosis with S-shaped lumbar rotary scoliosis and mild focal kyphosis centered at the L1-L2 level.      2.  Broad-based disc osteophyte complexes at the L2-L3 and more caudal levels  contribute to mild bilateral L2-L3, moderate bilateral L3-L4, moderate to severe right and moderate left L4-L5, and moderate to severe right and mild left L5-S1 neural   foraminal stenosis.      3.  Mild right lateral recess narrowing and mild spinal canal stenosis at L4-L5.      4.  Multiple bilateral simple appearing renal cysts are seen. Within the ventral aspect of the left kidney is a 6 mm focus of low signal on T2-weighted images, nonspecific but favored to reflect a small hemorrhagic cyst.      Again, thank you for allowing me to participate in the care of your patient.        Sincerely,        Eddie Kasper MD

## 2024-08-09 NOTE — PATIENT INSTRUCTIONS
~Spine Center Scheduling #[phone].  ~Please call our [facility] Spine Nurse Navigation #[phone] with any questions or concerns about your treatment plan, if symptoms worsen and you would like to be seen urgently, or if you have problems controlling bladder and bowel function.  ~For any future flareups or new symptoms, recommend follow-up in clinic or contact the nurse navigator line.  ~Please note that any My Chart messages may take multiple days for a response due to the high volume of patients seen in clinic.  Anything sent Friday night or after will be answered the following week when able.     Imaging has been ordered. Radiology will call you to schedule. Please call below if you do not hear from them in the next couple of days.     [facility] Radiology Scheduling    Please call [phone] to schedule your image(s) (select option #1). There are 3 different locations near, see below.   There are imaging options for other locations as well, the imaging schedulers can help with other locations within [place].     [facility]  [address] [address]    [facility] [place]  [address], [address]   [place] [address]    [facility]  [address] [id number]

## 2024-10-15 ENCOUNTER — TELEPHONE (OUTPATIENT)
Dept: FAMILY MEDICINE | Facility: CLINIC | Age: 84
End: 2024-10-15
Payer: COMMERCIAL

## 2024-10-15 NOTE — TELEPHONE ENCOUNTER
Forms/Letter Request    Type of form/letter: DME     Type of DME requested: Pull Ups    Do we have the form/letter: Yes: in provider inbox    Who is the form from? [employer] (if other please explain)    Where did/will the form come from? form was faxed in    When is form/letter needed by: asap    How would you like the form/letter returned: Fax : [phone]    Patient Notified form requests are processed in 5-7 business days:No    Okay to leave a detailed message?: Yes at Home number on file [phone] (home)

## 2024-10-17 ENCOUNTER — MEDICAL CORRESPONDENCE (OUTPATIENT)
Dept: HEALTH INFORMATION MANAGEMENT | Facility: CLINIC | Age: 84
End: 2024-10-17
Payer: COMMERCIAL

## 2024-10-22 ENCOUNTER — OFFICE VISIT (OUTPATIENT)
Dept: FAMILY MEDICINE | Facility: CLINIC | Age: 84
End: 2024-10-22
Payer: COMMERCIAL

## 2024-10-22 ENCOUNTER — ANCILLARY PROCEDURE (OUTPATIENT)
Dept: GENERAL RADIOLOGY | Facility: CLINIC | Age: 84
End: 2024-10-22
Attending: STUDENT IN AN ORGANIZED HEALTH CARE EDUCATION/TRAINING PROGRAM
Payer: COMMERCIAL

## 2024-10-22 VITALS
TEMPERATURE: 97.3 F | HEIGHT: 60 IN | BODY MASS INDEX: 21.6 KG/M2 | SYSTOLIC BLOOD PRESSURE: 110 MMHG | HEART RATE: 78 BPM | OXYGEN SATURATION: 95 % | DIASTOLIC BLOOD PRESSURE: 71 MMHG | WEIGHT: 110 LBS | RESPIRATION RATE: 16 BRPM

## 2024-10-22 DIAGNOSIS — G89.29 CHRONIC BILATERAL LOW BACK PAIN WITHOUT SCIATICA: ICD-10-CM

## 2024-10-22 DIAGNOSIS — M54.50 CHRONIC BILATERAL LOW BACK PAIN WITHOUT SCIATICA: ICD-10-CM

## 2024-10-22 DIAGNOSIS — Z23 NEED FOR SHINGLES VACCINE: ICD-10-CM

## 2024-10-22 DIAGNOSIS — M47.816 LUMBAR SPONDYLOSIS: ICD-10-CM

## 2024-10-22 DIAGNOSIS — R06.2 WHEEZING: Primary | ICD-10-CM

## 2024-10-22 DIAGNOSIS — R26.9 IMPAIRED GAIT: ICD-10-CM

## 2024-10-22 DIAGNOSIS — M51.369 LUMBAR DEGENERATIVE DISC DISEASE: ICD-10-CM

## 2024-10-22 DIAGNOSIS — R94.2 ABNORMAL PFT: ICD-10-CM

## 2024-10-22 DIAGNOSIS — Z99.89 USES WALKER: ICD-10-CM

## 2024-10-22 DIAGNOSIS — Z23 NEEDS FLU SHOT: ICD-10-CM

## 2024-10-22 DIAGNOSIS — Z23 NEED FOR COVID-19 VACCINE: ICD-10-CM

## 2024-10-22 DIAGNOSIS — Z29.11 NEED FOR RSV VACCINATION: ICD-10-CM

## 2024-10-22 PROCEDURE — 90480 ADMN SARSCOV2 VAC 1/ONLY CMP: CPT | Performed by: STUDENT IN AN ORGANIZED HEALTH CARE EDUCATION/TRAINING PROGRAM

## 2024-10-22 PROCEDURE — 90662 IIV NO PRSV INCREASED AG IM: CPT | Performed by: STUDENT IN AN ORGANIZED HEALTH CARE EDUCATION/TRAINING PROGRAM

## 2024-10-22 PROCEDURE — 72110 X-RAY EXAM L-2 SPINE 4/>VWS: CPT | Mod: TC | Performed by: RADIOLOGY

## 2024-10-22 PROCEDURE — 99214 OFFICE O/P EST MOD 30 MIN: CPT | Mod: 25 | Performed by: STUDENT IN AN ORGANIZED HEALTH CARE EDUCATION/TRAINING PROGRAM

## 2024-10-22 PROCEDURE — 91320 SARSCV2 VAC 30MCG TRS-SUC IM: CPT | Performed by: STUDENT IN AN ORGANIZED HEALTH CARE EDUCATION/TRAINING PROGRAM

## 2024-10-22 PROCEDURE — 90471 IMMUNIZATION ADMIN: CPT | Performed by: STUDENT IN AN ORGANIZED HEALTH CARE EDUCATION/TRAINING PROGRAM

## 2024-10-22 NOTE — PROGRESS NOTES
"  Teodoro was seen today for office visit.    Diagnoses and all orders for this visit:    Wheezing  Abnormal PFT  Comments:  CT lung ordered by pulm.  Patient declined 2/2 thinking something will go in his nose.  Clarified that nothing will go in nose.  Will have TC help schedule  Follow-up with pulmonology  RTC 3 months    Chronic bilateral low back pain without sciatica  Comments:  Saw Spine 8/9/24 - XR and MRI lumbar ordered, follow up after.  Did not do x-ray or MRI.  Will have TC help schedule  -Follow-up with spine after  -RTC 3 months    Impaired gait  Uses walker  Renewed disability parking permit.    Needs flu shot  -     INFLUENZA HIGH DOSE, TRIVALENT, PF (FLUZONE)    Need for COVID-19 vaccine  -     COVID-19 12+ (PFIZER)    Need for RSV vaccination  Need for shingles vaccine  -     Discussed, would like to defer.      The longitudinal plan of care for the diagnosis(es)/condition(s) as documented were addressed during this visit. Due to the added complexity in care, I will continue to support Teodoro in the subsequent management and with ongoing continuity of care.      Subjective   Teodoro is a 84 year old, presenting for the following health issues:  office visit (6 mons follow up, disability parking sticker)        10/22/2024     2:11 PM   Additional Questions   Roomed by    Accompanied by PCA     History of Present Illness       Reason for visit:  6 mons follow up   He is taking medications regularly.           Objective    /71 (BP Location: Left arm, Patient Position: Sitting, Cuff Size: Adult Regular)   Pulse 78   Temp 97.3  F (36.3  C) (Temporal)   Resp 16   Ht 1.53 m (5' 0.24\")   Wt 49.9 kg (110 lb)   SpO2 95%   BMI 21.31 kg/m    Body mass index is 21.31 kg/m .  Physical Exam   General: Well developed, well nourished.  Skin:  Dry without rash.    Head:  Normocephalic-atraumatic.    Eye:  Normal conjunctivae.     Respiratory:  Normal respiratory effort.   Gastrointestinal:  Non-distended.  "   Musculoskeletal:  No deformity or edema.  Neurologic: No focal deficits.          Signed Electronically by: Tricia Cohn MD    Prior to immunization administration, verified patients identity using patient s name and date of birth. Please see Immunization Activity for additional information.     Screening Questionnaire for Adult Immunization    Are you sick today?   No   Do you have allergies to medications, food, a vaccine component or latex?   No   Have you ever had a serious reaction after receiving a vaccination?   No   Do you have a long-term health problem with heart, lung, kidney, or metabolic disease (e.g., diabetes), asthma, a blood disorder, no spleen, complement component deficiency, a cochlear implant, or a spinal fluid leak?  Are you on long-term aspirin therapy?   No   Do you have cancer, leukemia, HIV/AIDS, or any other immune system problem?   No   Do you have a parent, brother, or sister with an immune system problem?   No   In the past 3 months, have you taken medications that affect  your immune system, such as prednisone, other steroids, or anticancer drugs; drugs for the treatment of rheumatoid arthritis, Crohn s disease, or psoriasis; or have you had radiation treatments?   No   Have you had a seizure, or a brain or other nervous system problem?   No   During the past year, have you received a transfusion of blood or blood    products, or been given immune (gamma) globulin or antiviral drug?   No   For women: Are you pregnant or is there a chance you could become       pregnant during the next month?   No   Have you received any vaccinations in the past 4 weeks?   No     Immunization questionnaire answers were all negative.      Patient instructed to remain in clinic for 15 minutes afterwards, and to report any adverse reactions.     Screening performed by Lev Boo MA on 10/22/2024 at 2:12 PM.

## 2024-10-23 ENCOUNTER — TELEPHONE (OUTPATIENT)
Dept: PHYSICAL MEDICINE AND REHAB | Facility: CLINIC | Age: 84
End: 2024-10-23
Payer: COMMERCIAL

## 2024-10-23 NOTE — TELEPHONE ENCOUNTER
Phone call to patient to review results and provider's recommendations with assistance of Anamika  # [id number] Everardo through [employer]. Results given and explained. Discussed making a follow up appointment to review these findings as well as the MRI (scheduled for 11/11/24) and treatment options. Transferred to scheduling to make appointment.

## 2024-10-23 NOTE — PROGRESS NOTES
Future Appointments 10/23/2024 - 4/21/2025        Date Visit Type Length Department Provider     11/7/2024  2:30 PM MA VISIT 30 min [facility] FAMILY MEDICINE/OB SPRS MA/LPN    Location Instructions:     [facility] is located at [address] in [place], at the intersection of [facility]. This is one block south of the [facility]. Free parking is available in the lot directly north of the clinic across [facility]. The clinic is near stops along bus [address] and [address].              11/11/2024  3:15 PM MR LUMBAR SPINE WO 45 min [facility] MRI IC [id number]    Location Instructions:     [address], [address] [place] [address]  We are located in [facility], across [facility], from [facility]. Parking: Parking is free at the [facility] and [facility] [place]. We also offer a protected drop off area at the front entrance.  Entrance and check-in location: Please enter through the main front doors (those facing [facility]) of the Specialty Center.&nbsp; The imaging center is on the first floor [address].  This appointment is in a hospital-based location.&nbsp; Before your visit, you may want to check with your insurance company for coverage and referral options, including cost differences between services provided in different clinic settings.&nbsp; For more information visit this link on the [employer] Website:&nbsp; [email or web address]              11/11/2024  4:00 PM CT CHEST HI-RESOLUTION WO CONT 20 min SJN CT IC [email or web address]    Location Instructions:     [facility] [address], [address] [place] [address]  We are located in [facility], across [facility], from [facility].  Parking: Parking is free at the [facility] and [facility]. We also offer a protected drop off area at the front entrance.  Entrance and check-in location: Please enter through the main front doors (those facing [facility]) of the Specialty Center.&nbsp; The imaging center is on the first floor [address].  This appointment is in a hospital-based location.&nbsp; Before your visit, you may want to check with your insurance company for coverage and referral options, including cost differences between services provided in different clinic settings.&nbsp; For more information visit this link on the [employer] Website:&nbsp; [email or web address]              1/24/2025  2:30 PM OFFICE VISIT 30 min [facility] FAMILY MEDICINE/EM Cohn Mai, MD    Location Instructions:     [facility] is located at [address] [place] [address] in [place], at the intersection of [facility]. This is one block south of the [facility]. Free parking is available in the lot directly north of the clinic across [facility]. The clinic is near stops along bus [address] and [address].

## 2024-10-23 NOTE — TELEPHONE ENCOUNTER
----- Message from Eddie Kasper sent at 10/23/2024  1:43 PM CDT -----  X-ray shows degenerative/arthritis changes in lower spine, and old fracture at the last vertebra (L5). Will follow up further once MRI is completed.

## 2024-10-26 DIAGNOSIS — F51.01 PRIMARY INSOMNIA: ICD-10-CM

## 2024-10-27 DIAGNOSIS — R21 RASH: ICD-10-CM

## 2024-10-27 DIAGNOSIS — H04.123 DRY EYES: ICD-10-CM

## 2024-10-28 RX ORDER — OLOPATADINE HYDROCHLORIDE 1 MG/ML
1 SOLUTION/ DROPS OPHTHALMIC 2 TIMES DAILY
Qty: 10 ML | Refills: 2 | Status: SHIPPED | OUTPATIENT
Start: 2024-10-28

## 2024-10-28 RX ORDER — TRIAMCINOLONE ACETONIDE 1 MG/G
OINTMENT TOPICAL 2 TIMES DAILY
Qty: 80 G | Refills: 2 | Status: SHIPPED | OUTPATIENT
Start: 2024-10-28

## 2024-10-28 RX ORDER — RAMELTEON 8 MG/1
8 TABLET, FILM COATED ORAL AT BEDTIME
Qty: 90 TABLET | Refills: 0 | Status: SHIPPED | OUTPATIENT
Start: 2024-10-28

## 2024-11-12 ENCOUNTER — PATIENT OUTREACH (OUTPATIENT)
Dept: CARE COORDINATION | Facility: CLINIC | Age: 84
End: 2024-11-12
Payer: COMMERCIAL

## 2024-11-12 NOTE — PROGRESS NOTES
Clinic Care Coordination Contact    CCC team received message from clinic that pt was wanting an appt with CCC team.    CC SW started and created a new program.    JASKARAN Sotomayor   Social Work Care Coordinator   [facility]  [phone]

## 2024-11-12 NOTE — PROGRESS NOTES
11/12/2024  Clinic Care Coordination Contact  Community Health Worker Initial Outreach    CHW Initial Information Gathering:  Referral Source: Self-patient/Caregiver  Preferred Hospital: [facility]  [phone]  Preferred Urgent Care: [facility], [phone]  Current living arrangement:: I live in a private home with spouse, I live in a private home with family  Community Resources: [facility], [facility],   Informal Support system:: Family, Spouse, Other (PCA Eh Eh Peter)  No PCP office visit in Past Year: No  Transportation means:: Family (PCA)  CHW Additional Questions  If ED/Hospital discharge, follow-up appointment scheduled as recommended?: N/A  Medication changes made following ED/Hospital discharge?: N/A  MyChart active?: No  Patient agreeable to assistance with activating MyChart?: No    Patient accepts CC: No, [facility] will support to connect with [facility] office and [facility] to address the issues. Patient will be sent Care Coordination introduction letter for future reference.     Received VM from patient's [facility] Peter.  Anamika : Vipul ID # [id number]   Called and spoke with [facility] regarding VM.  Patient's PCA calling on behind of patient  Consent to talk to PCA Eh Eh Peter on file,  Stated her patient's social security benefit stopped and that he did not received S.S.I benefit for 2 months.  Did not received S. S I benefit in Sept and October 2024  PCA reported patient's wife moved back in with patient on 9-16-24. She spoke with [facility] and they said to wait for 2 months but has not heard back from them.  Suggested PCA to support patient's wife and patient to update address with [facility] office and [facility] of when she moved.  Explained to [facility] office that will recalculate the S.S I benefit amount due to patient's wife moved back in the home.  Suggested for them to wait to hear back from [facility] office.  [facility] confirmed info and will wait to  hear back from [facility] office.    PCA questions why he keeps getting medical bills from [facility].   Suggested PCA to call to [facility] billing dept and provide [employer] insurance ID# to re submit bill to [facility].  PCA Lev Green confirmed information and will fiona to [facility] billing dept.    Suggested to Roxane Kruse RN  [facility] for support. Stated she no longer is his worker new worker is LIOR Gimenez [phone]   Updated in Care team  Suggested to contact LIOR Gimenez [facility] Care Coordinator for support in the future.    CHW provided resources and information to PCA to support patient. No CCC team needed at this time.  Patient has support from [facility] and [facility] Care Coordinator LIOR Gimenez [phone]     Order for Care Management has been closed, no further outreach will be done at this time and patient can be re-referred.     Serene Farias  Community Health Worker  [facility] Care Coordination  [email or web address]   Office: [phone]  Fax: [phone]

## 2024-11-15 ENCOUNTER — OFFICE VISIT (OUTPATIENT)
Dept: PHYSICAL MEDICINE AND REHAB | Facility: CLINIC | Age: 84
End: 2024-11-15
Payer: COMMERCIAL

## 2024-11-15 ENCOUNTER — HOSPITAL ENCOUNTER (OUTPATIENT)
Dept: GENERAL RADIOLOGY | Facility: HOSPITAL | Age: 84
Discharge: HOME | End: 2024-11-15
Attending: STUDENT IN AN ORGANIZED HEALTH CARE EDUCATION/TRAINING PROGRAM | Admitting: STUDENT IN AN ORGANIZED HEALTH CARE EDUCATION/TRAINING PROGRAM
Payer: COMMERCIAL

## 2024-11-15 VITALS
BODY MASS INDEX: 21.6 KG/M2 | OXYGEN SATURATION: 93 % | SYSTOLIC BLOOD PRESSURE: 122 MMHG | HEIGHT: 60 IN | DIASTOLIC BLOOD PRESSURE: 72 MMHG | HEART RATE: 73 BPM | WEIGHT: 110 LBS

## 2024-11-15 DIAGNOSIS — M47.816 LUMBAR SPONDYLOSIS: ICD-10-CM

## 2024-11-15 DIAGNOSIS — M47.812 CERVICAL SPONDYLOSIS WITHOUT MYELOPATHY: ICD-10-CM

## 2024-11-15 DIAGNOSIS — M51.360 DEGENERATION OF INTERVERTEBRAL DISC OF LUMBAR REGION WITH DISCOGENIC BACK PAIN: Primary | ICD-10-CM

## 2024-11-15 PROCEDURE — 72050 X-RAY EXAM NECK SPINE 4/5VWS: CPT

## 2024-11-15 RX ORDER — MELOXICAM 7.5 MG/1
7.5 TABLET ORAL DAILY
Qty: 30 TABLET | Refills: 0 | Status: SHIPPED | OUTPATIENT
Start: 2024-11-15 | End: 2024-12-15

## 2024-11-15 ASSESSMENT — PAIN SCALES - GENERAL: PAINLEVEL_OUTOF10: MODERATE PAIN (5)

## 2024-11-15 NOTE — PROGRESS NOTES
Entire visit was conducted with the use of a live audio , Anamika fajardo,  ID [id number].    ASSESSMENT:  Teodoro Gregorio is a 84 year old male presents for consultation at the request of Tricia Cohn who presents today for new patient evaluation of :         Diagnoses and all orders for this visit:  Degeneration of intervertebral disc of lumbar region with discogenic back pain  -     meloxicam (MOBIC) 7.5 MG tablet; Take 1 tablet (7.5 mg) by mouth daily.  Lumbar spondylosis  -     meloxicam (MOBIC) 7.5 MG tablet; Take 1 tablet (7.5 mg) by mouth daily.  Cervical spondylosis without myelopathy  -     XR Cervical Spine w Flex/Ext G/E 4 Views; Future  -     Physical Therapy  Referral; Future       Patient is neurologically intact on exam. No myelopathic or red flag symptoms.    Patient is following up on his low back pain which has not appreciably changed, continues to be primarily axial in nature and more right-sided.  He also endorses neck pain which was not previously covered but appears to be axial in nature and likely also mediated by the same type of spondylotic/degenerative changes seen in the low back.  Patient unable to get his lumbar MRI done because of pending insurance/billing issues, but patient's daughter states that this will be resolved and then she will take him for his MRI.  In the interim, to help with pain we will start anti-inflammatory treatment with meloxicam and initiate PT to help with the neck while low back is being further evaluated.  Plan to follow-up once imaging is completed.    PLAN:  Reviewed spine anatomy and disease process. Discussed diagnosis and treatment options with the patient today. A shared decision making model was used. The patient's values and choices were respected. The following represents what was discussed and decided upon by the provider and the patient.    1. DIAGNOSTIC TESTS  X-ray of cervical spine with bending views was ordered for further  evaluation  Lumbar MRI is pending as well    2. PHYSICAL THERAPY  Physical therapy was ordered through [facility] or the external clinic of patient's choice.  Will start directional PT program to address cervical spine while lumbar spine is further being evaluated  Discussed the importance of core strengthening, ROM, stretching exercises with the patient and how each of these entities is important in decreasing pain.  Explained to the patient that the purpose of physical therapy is to teach the patient a home exercise program.  These exercises need to be performed every day in order to decrease pain and prevent future occurrences of pain.  Likened it to brushing one's teeth.      3. MEDICATIONS:  Discussed multiple medication options today with patient. Discussed risks, side effects, and proper use of medications. Patient verbalized understanding.  We will prescribe a short course of Meloxicam 7.5 mg daily. Patient was advised of dosing instructions, including to take this medication with food and water to reduce risk of gastric irritation. Patient denies any history of GI bleeding/ulcers or kidney issues, and was advised these medications can worsen these conditions. Patient also advised not to take any other NSAIDs while they are taking what we prescribed.    4. INTERVENTIONS:  Patient requires further imaging to assess what interventional options may be best for them.    5. OTHER REFERRALS:  No other referrals at this time.    6. FOLLOW-UP  To review imaging results once imaging is completed  Patient advised to contact our office for earlier appointment if symptoms worsening or not improving, or any side effects are noticed.    Advised patient to call the [facility] if symptoms worsen or you have problems controlling bladder and bowel function.   ______________________________________________________________________    SUBJECTIVE:   Teodoro Gregorio  is a 84 year old male who presents today for follow-up.  Patient is  accompanied by his adult daughter who helped provide collateral information.    Patient reports no significant changes in symptoms.  Continues to be primarily in the right side low back, worse with standing and improved with sitting.  As before, symptoms are worsened with walking at a distance and has been using a walker to help support due to pain.  As before, it is difficult to elucidate further details about the pain despite asking multiple times and patient's daughter even asking as well.    Patient responded affirmatively that pain does travel down into the legs, but then when asked where the pain travels to patient only showed his low back.  Despite attempts to clarify what location was being asked, patient unable to describe what area of his lower extremities are affected by radiating symptoms.  Patient reports he does also feel numbness and tingling in his lower extremities but unable to specify where.  Denies any bladder or bowel incontinence.    Patient also reports axial neck pain primarily in the right side of the neck with no associated radicular symptoms.  As with the low back, neck pain is worsened with standing and walking and improves with sitting.  Denies any radicular symptoms going down the arms.    No prior back surgeries    -Treatment to Date: Previously tried PT 5 years ago which caused worsening pain      Oswestry (DOMINIC) Questionnaire         No data to display                Neck Disability Index:       No data to display                       -Medications:    Current Outpatient Medications   Medication Sig Dispense Refill    acetaminophen (TYLENOL) 500 MG tablet Take 1-2 tablets (500-1,000 mg) by mouth every 8 hours as needed for mild pain 90 tablet 3    albuterol (PROAIR HFA/PROVENTIL HFA/VENTOLIN HFA) 108 (90 Base) MCG/ACT inhaler Inhale 2 puffs into the lungs every 6 hours as needed for shortness of breath, wheezing or cough 18 g 0    budesonide-formoterol (SYMBICORT) 160-4.5 MCG/ACT  Inhaler Inhale 2 puffs into the lungs 2 times daily 10.2 g 11    meclizine (ANTIVERT) 25 MG tablet Take 1 tablet (25 mg) by mouth 3 times daily as needed for dizziness 90 tablet 2    meloxicam (MOBIC) 7.5 MG tablet Take 1 tablet (7.5 mg) by mouth daily. 30 tablet 0    mirtazapine (REMERON) 15 MG tablet Take 1 tablet (15 mg) by mouth at bedtime 90 tablet 3    Multiple Vitamin (MULTI VITAMIN) TABS Take 1 tablet by mouth daily 90 tablet 3    multivitamin (ONE-DAILY) tablet Take 1 tablet by mouth daily 30 tablet 11    olopatadine (PATANOL) 0.1 % ophthalmic solution Place 1 drop into both eyes 2 times daily. 10 mL 2    ramelteon (ROZEREM) 8 MG tablet TAKE 1 TABLET (8 MG) BY MOUTH AT BEDTIME 90 tablet 0    triamcinolone (KENALOG) 0.1 % external ointment APPLY TOPICALLY 2 TIMES DAILY 80 g 2     No current facility-administered medications for this visit.       No Known Allergies    Past Medical History:   Diagnosis Date    Anemia, unspecified type 10/6/2022    recheck labs today to see if any improvement from June. unknown cause at this point.     Constipation     Hearing loss     Insomnia     Loss of appetite 7/20/2018    Multiple joint pain     TB lung, latent         Patient Active Problem List   Diagnosis    Insomnia    Multiple joint pain    Soft tissue mass    SNHL (sensorineural hearing loss)    Incontinence    Chronic bilateral low back pain without sciatica    Memory difficulty    Mild single current episode of major depressive disorder (H)    Poor dentition    Betel deposit on teeth    Prediabetes    Other eczema    Dizziness    Chronic pain of left knee    Impaired gait    Neck pain    Decreased appetite    Abnormal PFT       Past Surgical History:   Procedure Laterality Date    FOOT SURGERY Left        No family history on file.    Reviewed past medical, surgical, and family history with patient found on new patient intake packet located in EMR Media tab.     SOCIAL HX: Reviewed    ROS: Specifically negative  for bowel/bladder dysfunction, balance changes, headache, dizziness, foot drop, fevers, chills, appetite changes, nausea/vomiting, unexplained weight loss. Otherwise 13 systems reviewed are negative. Please see the patient's intake questionnaire from today for details.    OBJECTIVE:  /72 (BP Location: Left arm, Patient Position: Sitting, Cuff Size: Adult Regular)   Pulse 73   Ht 5' (1.524 m)   Wt 110 lb (49.9 kg)   SpO2 93%   BMI 21.48 kg/m      PHYSICAL EXAMINATION: Exam reviewed and updated as needed  --CONSTITUTIONAL: Vital signs as above. No acute distress. The patient is well nourished and well groomed.  --PSYCHIATRIC: The patient is awake, alert, oriented to person, place, time and answering questions appropriately with clear speech. Appropriate mood and affect   --RESPIRATORY: Normal rhythm and effort. No abnormal accessory muscle breathing patterns noted.   --GROSS MOTOR: Difficulty standing from a seated position due to pain.  Patient has significant thoracic kyphosis when standing.  --LUMBAR SPINE: Inspection reveals kyphoscoliotic deformity. Range of motion is limited in all planes. Mild tenderness to palpation of lumbar paraspinals, no tenderness in spinous processes.  Facet loading (Ng test) could not be performed as patient is very limited in lumbar extension.  Seated straight leg raise was negative bilaterally.  --HIPS: Full range of motion bilaterally.  --LOWER EXTREMITY MOTOR TESTING:  Hip flexion left 5/5, right 5/5  Knee extension left 5/5, right 5/5  Ankle dorsiflexors left 5/5, right 5/5  Ankle plantarflexors left 5/5, right 5/5   Great toe extension left 5/5, right 5/5   Knee flexion left 5/5, right 5/5  --NEUROLOGIC: Sensation to lower extremities is intact bilaterally in L2-S1 dermatomes.  Negative Melo's bilaterally. Reflexes intact in BLE, no clonus.  --VASCULAR: Warm upper limbs bilaterally. Capillary refill in the upper extremities is less than 1 second.    RESULTS:  Available medical records from [facility] and any other outside records were reviewed today.     Imaging:  Available relevant imaging was personally reviewed and interpreted today. The images were shown to the patient and the findings were explained using a spine model.    8/25/2019 MRI lumbar spine   IMPRESSION:  CONCLUSION:  1.  Moderate lumbar spondylosis with S-shaped lumbar rotary scoliosis and mild focal kyphosis centered at the L1-L2 level.      2.  Broad-based disc osteophyte complexes at the L2-L3 and more caudal levels contribute to mild bilateral L2-L3, moderate bilateral L3-L4, moderate to severe right and moderate left L4-L5, and moderate to severe right and mild left L5-S1 neural   foraminal stenosis.      3.  Mild right lateral recess narrowing and mild spinal canal stenosis at L4-L5.      4.  Multiple bilateral simple appearing renal cysts are seen. Within the ventral aspect of the left kidney is a 6 mm focus of low signal on T2-weighted images, nonspecific but favored to reflect a small hemorrhagic cyst.

## 2024-11-15 NOTE — LETTER
11/15/2024      Teodoro Gregorio  [address]  [place] [address]      Dear Colleague,    Thank you for referring your patient, Teodoro Gregorio, to the [facility]. Please see a copy of my visit note below.    Entire visit was conducted with the use of a live audio , Anamika fajardo,  ID [id number].    ASSESSMENT:  Teodoro Gregorio is a 84 year old male presents for consultation at the request of Tricia Cohn who presents today for new patient evaluation of :         Diagnoses and all orders for this visit:  Degeneration of intervertebral disc of lumbar region with discogenic back pain  -     meloxicam (MOBIC) 7.5 MG tablet; Take 1 tablet (7.5 mg) by mouth daily.  Lumbar spondylosis  -     meloxicam (MOBIC) 7.5 MG tablet; Take 1 tablet (7.5 mg) by mouth daily.  Cervical spondylosis without myelopathy  -     XR Cervical Spine w Flex/Ext G/E 4 Views; Future  -     Physical Therapy  Referral; Future       Patient is neurologically intact on exam. No myelopathic or red flag symptoms.    Patient is following up on his low back pain which has not appreciably changed, continues to be primarily axial in nature and more right-sided.  He also endorses neck pain which was not previously covered but appears to be axial in nature and likely also mediated by the same type of spondylotic/degenerative changes seen in the low back.  Patient unable to get his lumbar MRI done because of pending insurance/billing issues, but patient's daughter states that this will be resolved and then she will take him for his MRI.  In the interim, to help with pain we will start anti-inflammatory treatment with meloxicam and initiate PT to help with the neck while low back is being further evaluated.  Plan to follow-up once imaging is completed.    PLAN:  Reviewed spine anatomy and disease process. Discussed diagnosis and treatment options with the patient today. A shared decision making model was used. The  patient's values and choices were respected. The following represents what was discussed and decided upon by the provider and the patient.    1. DIAGNOSTIC TESTS  X-ray of cervical spine with bending views was ordered for further evaluation  Lumbar MRI is pending as well    2. PHYSICAL THERAPY  Physical therapy was ordered through [facility] or the external clinic of patient's choice.  Will start directional PT program to address cervical spine while lumbar spine is further being evaluated  Discussed the importance of core strengthening, ROM, stretching exercises with the patient and how each of these entities is important in decreasing pain.  Explained to the patient that the purpose of physical therapy is to teach the patient a home exercise program.  These exercises need to be performed every day in order to decrease pain and prevent future occurrences of pain.  Likened it to brushing one's teeth.      3. MEDICATIONS:  Discussed multiple medication options today with patient. Discussed risks, side effects, and proper use of medications. Patient verbalized understanding.  We will prescribe a short course of Meloxicam 7.5 mg daily. Patient was advised of dosing instructions, including to take this medication with food and water to reduce risk of gastric irritation. Patient denies any history of GI bleeding/ulcers or kidney issues, and was advised these medications can worsen these conditions. Patient also advised not to take any other NSAIDs while they are taking what we prescribed.    4. INTERVENTIONS:  Patient requires further imaging to assess what interventional options may be best for them.    5. OTHER REFERRALS:  No other referrals at this time.    6. FOLLOW-UP  To review imaging results once imaging is completed  Patient advised to contact our office for earlier appointment if symptoms worsening or not improving, or any side effects are noticed.    Advised patient to call the Spine Center if symptoms worsen or  you have problems controlling bladder and bowel function.   ______________________________________________________________________    SUBJECTIVE:   Teodoro Gregorio  is a 84 year old male who presents today for follow-up.  Patient is accompanied by his adult daughter who helped provide collateral information.    Patient reports no significant changes in symptoms.  Continues to be primarily in the right side low back, worse with standing and improved with sitting.  As before, symptoms are worsened with walking at a distance and has been using a walker to help support due to pain.  As before, it is difficult to elucidate further details about the pain despite asking multiple times and patient's daughter even asking as well.    Patient responded affirmatively that pain does travel down into the legs, but then when asked where the pain travels to patient only showed his low back.  Despite attempts to clarify what location was being asked, patient unable to describe what area of his lower extremities are affected by radiating symptoms.  Patient reports he does also feel numbness and tingling in his lower extremities but unable to specify where.  Denies any bladder or bowel incontinence.    Patient also reports axial neck pain primarily in the right side of the neck with no associated radicular symptoms.  As with the low back, neck pain is worsened with standing and walking and improves with sitting.  Denies any radicular symptoms going down the arms.    No prior back surgeries    -Treatment to Date: Previously tried PT 5 years ago which caused worsening pain      Oswestry (DOMINIC) Questionnaire         No data to display                Neck Disability Index:       No data to display                       -Medications:    Current Outpatient Medications   Medication Sig Dispense Refill     acetaminophen (TYLENOL) 500 MG tablet Take 1-2 tablets (500-1,000 mg) by mouth every 8 hours as needed for mild pain 90 tablet 3     albuterol  (PROAIR HFA/PROVENTIL HFA/VENTOLIN HFA) 108 (90 Base) MCG/ACT inhaler Inhale 2 puffs into the lungs every 6 hours as needed for shortness of breath, wheezing or cough 18 g 0     budesonide-formoterol (SYMBICORT) 160-4.5 MCG/ACT Inhaler Inhale 2 puffs into the lungs 2 times daily 10.2 g 11     meclizine (ANTIVERT) 25 MG tablet Take 1 tablet (25 mg) by mouth 3 times daily as needed for dizziness 90 tablet 2     meloxicam (MOBIC) 7.5 MG tablet Take 1 tablet (7.5 mg) by mouth daily. 30 tablet 0     mirtazapine (REMERON) 15 MG tablet Take 1 tablet (15 mg) by mouth at bedtime 90 tablet 3     Multiple Vitamin (MULTI VITAMIN) TABS Take 1 tablet by mouth daily 90 tablet 3     multivitamin (ONE-DAILY) tablet Take 1 tablet by mouth daily 30 tablet 11     olopatadine (PATANOL) 0.1 % ophthalmic solution Place 1 drop into both eyes 2 times daily. 10 mL 2     ramelteon (ROZEREM) 8 MG tablet TAKE 1 TABLET (8 MG) BY MOUTH AT BEDTIME 90 tablet 0     triamcinolone (KENALOG) 0.1 % external ointment APPLY TOPICALLY 2 TIMES DAILY 80 g 2     No current facility-administered medications for this visit.       No Known Allergies    Past Medical History:   Diagnosis Date     Anemia, unspecified type 10/6/2022    recheck labs today to see if any improvement from June. unknown cause at this point.      Constipation      Hearing loss      Insomnia      Loss of appetite 7/20/2018     Multiple joint pain      TB lung, latent         Patient Active Problem List   Diagnosis     Insomnia     Multiple joint pain     Soft tissue mass     SNHL (sensorineural hearing loss)     Incontinence     Chronic bilateral low back pain without sciatica     Memory difficulty     Mild single current episode of major depressive disorder (H)     Poor dentition     Betel deposit on teeth     Prediabetes     Other eczema     Dizziness     Chronic pain of left knee     Impaired gait     Neck pain     Decreased appetite     Abnormal PFT       Past Surgical History:    Procedure Laterality Date     FOOT SURGERY Left        No family history on file.    Reviewed past medical, surgical, and family history with patient found on new patient intake packet located in EMR Media tab.     SOCIAL HX: Reviewed    ROS: Specifically negative for bowel/bladder dysfunction, balance changes, headache, dizziness, foot drop, fevers, chills, appetite changes, nausea/vomiting, unexplained weight loss. Otherwise 13 systems reviewed are negative. Please see the patient's intake questionnaire from today for details.    OBJECTIVE:  /72 (BP Location: Left arm, Patient Position: Sitting, Cuff Size: Adult Regular)   Pulse 73   Ht 5' (1.524 m)   Wt 110 lb (49.9 kg)   SpO2 93%   BMI 21.48 kg/m      PHYSICAL EXAMINATION: Exam reviewed and updated as needed  --CONSTITUTIONAL: Vital signs as above. No acute distress. The patient is well nourished and well groomed.  --PSYCHIATRIC: The patient is awake, alert, oriented to person, place, time and answering questions appropriately with clear speech. Appropriate mood and affect   --RESPIRATORY: Normal rhythm and effort. No abnormal accessory muscle breathing patterns noted.   --GROSS MOTOR: Difficulty standing from a seated position due to pain.  Patient has significant thoracic kyphosis when standing.  --LUMBAR SPINE: Inspection reveals kyphoscoliotic deformity. Range of motion is limited in all planes. Mild tenderness to palpation of lumbar paraspinals, no tenderness in spinous processes.  Facet loading (Ng test) could not be performed as patient is very limited in lumbar extension.  Seated straight leg raise was negative bilaterally.  --HIPS: Full range of motion bilaterally.  --LOWER EXTREMITY MOTOR TESTING:  Hip flexion left 5/5, right 5/5  Knee extension left 5/5, right 5/5  Ankle dorsiflexors left 5/5, right 5/5  Ankle plantarflexors left 5/5, right 5/5   Great toe extension left 5/5, right 5/5   Knee flexion left 5/5, right 5/5  --NEUROLOGIC:  Sensation to lower extremities is intact bilaterally in L2-S1 dermatomes.  Negative Melo's bilaterally. Reflexes intact in BLE, no clonus.  --VASCULAR: Warm upper limbs bilaterally. Capillary refill in the upper extremities is less than 1 second.    RESULTS: Available medical records from [facility] and any other outside records were reviewed today.     Imaging:  Available relevant imaging was personally reviewed and interpreted today. The images were shown to the patient and the findings were explained using a spine model.    8/25/2019 MRI lumbar spine   IMPRESSION:  CONCLUSION:  1.  Moderate lumbar spondylosis with S-shaped lumbar rotary scoliosis and mild focal kyphosis centered at the L1-L2 level.      2.  Broad-based disc osteophyte complexes at the L2-L3 and more caudal levels contribute to mild bilateral L2-L3, moderate bilateral L3-L4, moderate to severe right and moderate left L4-L5, and moderate to severe right and mild left L5-S1 neural   foraminal stenosis.      3.  Mild right lateral recess narrowing and mild spinal canal stenosis at L4-L5.      4.  Multiple bilateral simple appearing renal cysts are seen. Within the ventral aspect of the left kidney is a 6 mm focus of low signal on T2-weighted images, nonspecific but favored to reflect a small hemorrhagic cyst.      Again, thank you for allowing me to participate in the care of your patient.        Sincerely,        Eddie Kasper MD

## 2024-11-15 NOTE — PATIENT INSTRUCTIONS
~Spine Center Scheduling #[phone].  ~Please call our [facility] Spine Nurse Navigation #[phone] with any questions or concerns about your treatment plan, if symptoms worsen and you would like to be seen urgently, or if you have problems controlling bladder and bowel function.  ~For any future flareups or new symptoms, recommend follow-up in clinic or contact the nurse navigator line.  ~Please note that any My Chart messages may take multiple days for a response due to the high volume of patients seen in clinic.  Anything sent Friday night or after will be answered the following week when able.     Physical Therapy:  We have placed a referral for you to start physical therapy. You should be receiving a call within the next 2-4 business days to schedule your first session. If you do not hear from the PT clinic within a week please call our clinic for assistance.    Physical therapy is one of the most safe and effective treatments available for spine-related pain. Consistency is the most important factor when it comes to physical therapy - your therapist will give you exercises and stretches that you can perform at home, often without any special equipment. You should try to do those exercises at least once a day as long as they do not worsen your pain. Your therapist is trained to identify when your symptoms are worsening, but if you feel any new symptoms of numbness, weakness, or loss of bladder/bowel control please inform your therapist or call our clinic.    We will plan to follow up with you once your therapy visits are completed.     Imaging has been ordered. Radiology will call you to schedule. Please call below if you do not hear from them in the next couple of days.     [facility] Radiology Scheduling    Please call [phone] to schedule your image(s) (select option #1). There are 3 different locations near, see below.   There are imaging options for other locations as well, the  imaging schedulers can help with other locations within [place].     [facility]  [address] [address]    [facility] [place]  [address]   [place] [id number]    [place] [address] [place] [id number]     Prescribed meloxicam today. Please take as prescribed, as needed for pain control as well as to aid in decreasing inflammation. Take medication with a full glass of water and food.   *Do not take Advil, Ibuprofen, Aleve, or Naproxen while taking this medication as it can cause organ failure if taken together*  This medication does have risks if taken long term, these risks include: gastrointestinal irritation, kidney dysfunction, and cardiovascular effects.

## 2024-11-19 ENCOUNTER — TELEPHONE (OUTPATIENT)
Dept: PHYSICAL MEDICINE AND REHAB | Facility: CLINIC | Age: 84
End: 2024-11-19

## 2024-11-19 NOTE — TELEPHONE ENCOUNTER
----- Message from Eddie Kasper sent at 11/18/2024  8:27 AM CST -----  No visible fractures or instability. Arthritis/degenerative changes are noted at multiple levels which can be age appropriate.

## 2024-11-19 NOTE — TELEPHONE ENCOUNTER
Call placed to patient with provider's results and recommendations with assistance of Anamika  #[phone].   Unable to get through x2 due to busy signal.   Will try again later.

## 2024-12-11 NOTE — TELEPHONE ENCOUNTER
No response from pt. Second attempt placed with assistance of language line solutions Anamkia  #[phone].   Left message to return call.

## 2025-01-02 ENCOUNTER — TELEPHONE (OUTPATIENT)
Dept: FAMILY MEDICINE | Facility: CLINIC | Age: 85
End: 2025-01-02
Payer: MEDICARE

## 2025-01-02 DIAGNOSIS — F32.0 MILD SINGLE CURRENT EPISODE OF MAJOR DEPRESSIVE DISORDER: ICD-10-CM

## 2025-01-02 DIAGNOSIS — R63.0 POOR APPETITE: ICD-10-CM

## 2025-01-02 DIAGNOSIS — R42 DIZZINESS: ICD-10-CM

## 2025-01-02 DIAGNOSIS — F51.01 PRIMARY INSOMNIA: ICD-10-CM

## 2025-01-02 RX ORDER — RAMELTEON 8 MG/1
8 TABLET ORAL AT BEDTIME
Qty: 90 TABLET | Refills: 0 | Status: CANCELLED | OUTPATIENT
Start: 2025-01-02

## 2025-01-02 RX ORDER — MIRTAZAPINE 15 MG/1
15 TABLET, FILM COATED ORAL AT BEDTIME
Qty: 90 TABLET | Refills: 3 | Status: CANCELLED | OUTPATIENT
Start: 2025-01-02

## 2025-01-02 NOTE — TELEPHONE ENCOUNTER
Prior Authorization Retail Medication Request    Medication/Dose: ramelteon (ROZEREM) 8 MG tablet   Diagnosis and ICD code (if different than what is on RX):    New/renewal/insurance change PA/secondary ins. PA:  Previously Tried and Failed:  not sure  Rationale:  insurance requires a PA    Insurance   Primary: Medicare  Insurance ID:  [id number]    Secondary (if applicable) Blue Plus Advantage  Insurance ID:  [id number]    Pharmacy Information (if different than what is on RX)  [facility]  Phone:    Fax:    Clinic Information  Preferred routing pool for dept communication: [facility] PRIMARY CARE POOL    [email or web address]   Key:[id number]  Last name:TIN  : [id number]

## 2025-01-02 NOTE — TELEPHONE ENCOUNTER
Prior Authorization Retail Medication Request    Medication/Dose: mirtazapine (REMERON) 15 MG tablet   Diagnosis and ICD code (if different than what is on RX):    New/renewal/insurance change PA/secondary ins. PA:  Previously Tried and Failed:  not sure  Rationale:  insurance requires a PA    Insurance   Primary: Medicare  Insurance ID:  [id number]    Secondary (if applicable):Blue Plus Advantage  Insurance ID:  [id number]    Pharmacy Information (if different than what is on RX)  Name:  [facility]  Phone:    Fax:    Clinic Information  Preferred routing pool for dept communication: [facility] pool    [email or web address]  Key: [id number]  Last Name: SHANNAN  : [id number]

## 2025-01-03 NOTE — TELEPHONE ENCOUNTER
PA Initiation    Medication: ROZEREM 8 MG PO TABS  Insurance Company: Blue Plus PMAP - Phone [phone] Fax [phone]  Pharmacy Filling the Rx: [facility] - [place], [place] - [address] [place] [address]  Filling Pharmacy Phone: [phone]  Filling Pharmacy Fax: [phone]  Start Date: 1/3/2025

## 2025-01-03 NOTE — TELEPHONE ENCOUNTER
[employer] : [id number]  PA Initiation    Medication: MIRTAZAPINE 15 MG PO TABS  Insurance Company: [employer] - Phone [phone] Fax [phone]  Pharmacy Filling the Rx: [place], [place] - [address] [place] [address]  Filling Pharmacy Phone: [phone]  Filling Pharmacy Fax:    Start Date: 1/3/2025

## 2025-01-03 NOTE — TELEPHONE ENCOUNTER
Prior Authorization Not Needed per Insurance    Medication: ROZEREM 8 MG PO TABS  Insurance Company: Blue Plus PMAP - Phone [phone] Fax [phone]  Expected CoPay: $    Pharmacy Filling the Rx: [facility] - [place], [place] - [address] [place] [address]  Pharmacy Notified: n  Patient Notified: n    Patient has coverage through primary payer, review process stopped

## 2025-01-03 NOTE — TELEPHONE ENCOUNTER
PA Initiation    Medication: MIRTAZAPINE 15 MG PO TABS  Insurance Company: [employer] - Phone [phone] Fax [phone]  Pharmacy Filling the Rx: [facility] - [place], [place] - [address] [place] [address]  Filling Pharmacy Phone: [phone]  Filling Pharmacy Fax:    Start Date: 1/3/2025

## 2025-01-03 NOTE — TELEPHONE ENCOUNTER
Prior Authorization Approval    Medication: MIRTAZAPINE 15 MG PO TABS  Authorization Effective Date: 1/3/2025  Authorization Expiration Date: 1/2/2026  Approved Dose/Quantity: 30/30  Reference #: [id number]   Insurance Company: [employer] Phone [phone] Fax [phone]  Expected CoPay: $    CoPay Card Available:      Financial Assistance Needed:   Which Pharmacy is filling the prescription: [place], [place] - [address] [place] [address]  Pharmacy Notified: Yes  Patient Notified: Yes

## 2025-01-24 ENCOUNTER — OFFICE VISIT (OUTPATIENT)
Dept: FAMILY MEDICINE | Facility: CLINIC | Age: 85
End: 2025-01-24
Payer: MEDICARE

## 2025-01-24 VITALS
WEIGHT: 117 LBS | OXYGEN SATURATION: 96 % | SYSTOLIC BLOOD PRESSURE: 112 MMHG | BODY MASS INDEX: 22.09 KG/M2 | TEMPERATURE: 97.2 F | RESPIRATION RATE: 14 BRPM | DIASTOLIC BLOOD PRESSURE: 76 MMHG | HEIGHT: 61 IN | HEART RATE: 68 BPM

## 2025-01-24 DIAGNOSIS — M25.541 PAIN INVOLVING JOINT OF FINGER OF RIGHT HAND: Primary | ICD-10-CM

## 2025-01-24 DIAGNOSIS — R06.2 WHEEZING: ICD-10-CM

## 2025-01-24 DIAGNOSIS — Z29.11 NEED FOR VACCINATION AGAINST RESPIRATORY SYNCYTIAL VIRUS: ICD-10-CM

## 2025-01-24 DIAGNOSIS — Z59.71 INSURANCE COVERAGE PROBLEMS: ICD-10-CM

## 2025-01-24 DIAGNOSIS — G89.29 CHRONIC BILATERAL LOW BACK PAIN WITHOUT SCIATICA: ICD-10-CM

## 2025-01-24 DIAGNOSIS — R94.2 ABNORMAL PFT: ICD-10-CM

## 2025-01-24 DIAGNOSIS — Z23 NEED FOR SHINGLES VACCINE: ICD-10-CM

## 2025-01-24 DIAGNOSIS — M54.50 CHRONIC BILATERAL LOW BACK PAIN WITHOUT SCIATICA: ICD-10-CM

## 2025-01-24 PROCEDURE — 99214 OFFICE O/P EST MOD 30 MIN: CPT | Performed by: STUDENT IN AN ORGANIZED HEALTH CARE EDUCATION/TRAINING PROGRAM

## 2025-01-24 PROCEDURE — G2211 COMPLEX E/M VISIT ADD ON: HCPCS | Performed by: STUDENT IN AN ORGANIZED HEALTH CARE EDUCATION/TRAINING PROGRAM

## 2025-01-24 ASSESSMENT — PATIENT HEALTH QUESTIONNAIRE - PHQ9
SUM OF ALL RESPONSES TO PHQ QUESTIONS 1-9: 3
10. IF YOU CHECKED OFF ANY PROBLEMS, HOW DIFFICULT HAVE THESE PROBLEMS MADE IT FOR YOU TO DO YOUR WORK, TAKE CARE OF THINGS AT HOME, OR GET ALONG WITH OTHER PEOPLE: NOT DIFFICULT AT ALL
SUM OF ALL RESPONSES TO PHQ QUESTIONS 1-9: 3

## 2025-01-24 NOTE — PATIENT INSTRUCTIONS
Check with your pharmacy regarding getting the RSV vaccine, and shingles vaccine, as Medicare will not cover these in clinic, however, they will cover it if you get at the pharmacy.

## 2025-01-24 NOTE — PROGRESS NOTES
***  Wheezing  Abnormal PFT  Comments:  CT lung ordered by pulm.  Patient declined 2/2 thinking something will go in his nose.  Clarified that nothing will go in nose.  Will have TC help schedule  Follow-up with pulmonology  RTC 3 months  -CT not done yet***     Chronic bilateral low back pain without sciatica  Comments:  Saw Spine 8/9/24 - XR and MRI lumbar ordered, follow up after.  Did not do x-ray or MRI.  Will have TC help schedule  -Follow-up with spine after  -RTC 3 months  -MRI not done yet***     Impaired gait  Uses walker  Renewed disability parking permit.***

## 2025-01-24 NOTE — PROGRESS NOTES
Teodoro was seen today for follow up and finger.    Diagnoses and all orders for this visit:    Pain involving joint of finger of right hand  Comments:  3rd PIP pain, chronically contracted.  Mild swelling, no erythema or warmth.  Suspect OA.  Monitor.  Tylenol as needed.  Orders:  -     REVIEW OF HEALTH MAINTENANCE PROTOCOL ORDERS    Need for shingles vaccine  -     zoster vaccine recombinant adjuvanted (SHINGRIX) injection; Inject 0.5 mLs into the muscle once for 1 dose. Pharmacist administered    Need for vaccination against respiratory syncytial virus  -     RSV vaccine, bivalent, ABRYSVO, injection; Inject 0.5 mLs into the muscle once for 1 dose. Pharmacist administered    Insurance coverage problems  -     Primary Care - Care Coordination Referral; Future    Wheezing  Abnormal PFT  Comments:  Chronic, stable. CT lung ordered by pulm.   Follow-up with pulmonology  -CT not done yet -will check on insurance     Chronic bilateral low back pain without sciatica  Comments:  Chronic, stable. saw Spine 8/9/24 - XR and MRI lumbar ordered, follow up after.  Did not do x-ray or MRI.    -Follow-up with spine   -RTC 3 months  -MRI not done yet -will check insurance    Subjective   Teodoro is a 85 year old, presenting for the following health issues:  Follow Up and Finger (RIGHT SIDE, 3RD FINGER. BEEN PAINFUL X7)        1/24/2025     2:23 PM   Additional Questions   Roomed by nancy   Accompanied by pca     History of Present Illness       Back Pain:  He presents for follow up of back pain. Patient's back pain is a chronic problem.  Location of back pain:  Right lower back, right middle of back, right upper back, right side of neck and right shoulder  Description of back pain: other  Back pain spreads: right shoulder and right side of neck    Since patient first noticed back pain, pain is: unchanged  Does back pain interfere with his job:  No       Reason for visit:  Follow up    He eats 0-1 servings of fruits and vegetables  "daily.He consumes 1 sweetened beverage(s) daily.He exercises with enough effort to increase his heart rate 10 to 19 minutes per day.  He exercises with enough effort to increase his heart rate 3 or less days per week.   He is taking medications regularly.    3rd pip on the right  Contracted, swollen, painful for 1 week.   Improving  Contraction is chronic  Taking tylenol   No trauma or injury    Lab Results   Component Value Date    HGB 13.6 04/01/2024    WBC 6.6 04/01/2024    MCV 84 04/01/2024    CR 0.94 04/01/2024    AST 42 04/01/2024    ALT 30 04/01/2024    TSH 2.75 04/01/2024    GFRESTIMATED 79 04/01/2024    VITDT 29 (L) 03/09/2022    A1C 5.7 (H) 04/01/2024    A1C 5.9 (H) 03/30/2023    A1C 5.9 (H) 10/04/2022     Lumbar and cervical spondylosis  Was seen by Phys Med 11/15/24.   Reports they were billed for imaging.   MRI 6/2024? Received bill, but he does have insurance.   Spoke to  from [facility] and     MRI Lumbar 8/25/19:  1.  Moderate lumbar spondylosis with S-shaped lumbar rotary scoliosis and mild focal kyphosis centered at the L1-L2 level.   2.  Broad-based disc osteophyte complexes at the L2-L3 and more caudal levels contribute to mild bilateral L2-L3, moderate bilateral L3-L4, moderate to severe right and moderate left L4-L5, and moderate to severe right and mild left L5-S1 neural   foraminal stenosis.   3.  Mild right lateral recess narrowing and mild spinal canal stenosis at L4-L5.   4.  Multiple bilateral simple appearing renal cysts are seen. Within the ventral aspect of the left kidney is a 6 mm focus of low signal on T2-weighted images, nonspecific but favored to reflect a small hemorrhagic cyst.             Objective    /76 (BP Location: Left arm, Patient Position: Sitting, Cuff Size: Adult Regular)   Pulse 68   Temp 97.2  F (36.2  C) (Temporal)   Resp 14   Ht 1.56 m (5' 1.42\")   Wt 53.1 kg (117 lb)   SpO2 96%   BMI 21.81 kg/m    Body mass index is 21.81 kg/m .  Physical Exam "   General: Well developed, well nourished.  Skin:  Dry without rash.    Head:  Normocephalic-atraumatic.    Eye:  Normal conjunctivae.     Respiratory:  Normal respiratory effort.   Gastrointestinal:  Non-distended.    Musculoskeletal:  No deformity or edema.  Neurologic: No focal deficits.        Prior to immunization administration, verified patients identity using patient s name and date of birth. Please see Immunization Activity for additional information.     Screening Questionnaire for Adult Immunization    Are you sick today?   No   Do you have allergies to medications, food, a vaccine component or latex?   No   Have you ever had a serious reaction after receiving a vaccination?   No   Do you have a long-term health problem with heart, lung, kidney, or metabolic disease (e.g., diabetes), asthma, a blood disorder, no spleen, complement component deficiency, a cochlear implant, or a spinal fluid leak?  Are you on long-term aspirin therapy?   No   Do you have cancer, leukemia, HIV/AIDS, or any other immune system problem?   No   Do you have a parent, brother, or sister with an immune system problem?   No   In the past 3 months, have you taken medications that affect  your immune system, such as prednisone, other steroids, or anticancer drugs; drugs for the treatment of rheumatoid arthritis, Crohn s disease, or psoriasis; or have you had radiation treatments?   No   Have you had a seizure, or a brain or other nervous system problem?   No   During the past year, have you received a transfusion of blood or blood    products, or been given immune (gamma) globulin or antiviral drug?   No   For women: Are you pregnant or is there a chance you could become       pregnant during the next month?   No   Have you received any vaccinations in the past 4 weeks?   No     Immunization questionnaire answers were all negative.      Patient instructed to remain in clinic for 15 minutes afterwards, and to report any adverse  reactions.     Screening performed by Caden Franco MA on 1/24/2025 at 2:32 PM.       Signed Electronically by: Tricia Cohn MD

## 2025-01-27 ENCOUNTER — PATIENT OUTREACH (OUTPATIENT)
Dept: CARE COORDINATION | Facility: CLINIC | Age: 85
End: 2025-01-27
Payer: MEDICARE

## 2025-01-27 NOTE — PROGRESS NOTES
1/27/2025  Clinic Care Coordination Contact  Community Health Worker Initial Outreach    CHW Initial Information Gathering:  Referral Source: PCP  Preferred Hospital: [facility]  [phone]  Preferred Urgent Care: [facility], [phone]  Current living arrangement:: I live in a private home with family, I live in a private home with spouse  Type of residence:: Private home - stairs  Community Resources: [facility], [facility], [facility] Worker (Elderly Waiver services, [facility] Care Coordinator)  Supplies Currently Used at Home: Hearing Aid Batteries  Informal Support system:: Family, Other, Spouse (PCA Eh Eh Peter)  No PCP office visit in Past Year: No  Transportation means:: Family, Other (PCA)  CHW Additional Questions  If ED/Hospital discharge, follow-up appointment scheduled as recommended?: N/A  Medication changes made following ED/Hospital discharge?: N/A  MyChart active?: No    Patient accepts CC: No, resources and information provided to PCA. Patient will be sent Care Coordination introduction letter for future reference.     PCP referral: [facility] reports that pt still has bill for MRI done last summer that has not been resolved. Needs help.    Called Jocelyne Huang RN with [facility] Care Coordinator [phone] and left  to call patient's PCA and CHW back regarding MRI bill    Anamika : Criss ID# [id number]   Called and spoke with patient and patient's [facility] Peter regarding referral from PCP.  C2C on file to talk to [facility] Peter   Patient has hard time hearing.  Patient gave verbal permission to talk to [facility] Peter  Patient's [facility] Peter reported he went for MRI and she provided new MA# and then they never received the bill again.  Stated he now has Blue Plus MA and Medicare Part A and Part B card and wants to know what the insurance covers.  Stated he does not have any medical bills at this time. She just wanted to know what are  the coverages for the new insurance.  CHW explained to PCA that Part A covers Hospital and Part B for office visits and Blue Plus MA covers for dental and vision and specialities and prescriptions.  CHW suggested to call and talk to Jocelyne Huang RN with [facility] regarding services and support any questions about coverages.  PCA agreed to call Jocelyne Huang for support. She has her contact information.    No support from CCC team.  Has support from Jocelyne Huang RN [facility] Services.    Order for Care Management has been closed, no further outreach will be done at this time and patient can be re-referred.     Routed to PCP as KELLY Farias  Community Health Worker  [facility] Care Coordination  [email or web address]  [email or web address]   Office: [phone]  Fax: [phone]

## 2025-01-27 NOTE — PROGRESS NOTES
1/27/2025  [place] Department [place]: [place] [facility] [place] Eligibility Response (391)  print Print Page           SUBSCRIBER INFORMATION  Date of Service Subscriber ID Subscriber Name Birthdate Age Gender  01/27/2025 [id number] ODALYS CAMPBELL 1940 85 MALE     Address  [address] , , [place]  [address]     PROVIDER INFORMATION  Provider ID Submitter Transaction ID Provider Name Taxonomy Code Qualifier Taxonomy Code  [id number] xx [facility]  This subscriber has eligibility for MA: Medical Assistance.  Elig Type EX: Over age 65  Eligibility Begin Date: 04/01/2018  Eligibility End Date: --/--/----  This subscriber is eligible for the following service types: Medical Care ,  Chiropractic ,  Dental Care ,  Hospital ,  Hospital - Inpatient ,  Hospital - Outpatient ,  Emergency Services ,  Pharmacy ,  Professional (Physician) Visit - Office ,  Vision (Optometry) ,  Mental Health ,  Urgent Care  This subscriber has eligibility for QM: Qualified Medicare Beneficiary - Medical Assistance Covers Medicare Coinsurance and Deductibles Only.  Elig Type EQ: Over 65/QMB only  Eligibility Begin Date: 12/01/2024  Eligibility End Date: --/--/----  This subscriber is eligible for the following service types: Medical Care ,  Chiropractic ,  Hospital ,  Hospital - Inpatient ,  Hospital - Outpatient ,  Emergency Services ,  Professional (Physician) Visit - Office ,  Mental Health ,  Urgent Care  Prepaid Health Plan  This subscriber receives MA35 - [place] Care Plus (MSC+) delivered through [employer]. The phone numbers is [phone] ().  The health plan is responsible for paying for Nursing Facility Services.  Other Eligibility Information  No Special Transportation.  No Hospice.  County of residence is unknown.  Refer to Health Care Programs and Services Overview of the [facility] Provider Manual for a list of covered services.  Waivers  This subscriber is  eligible for the Elderly Waiver.  Elderly Waiver services are the responsibility of the Health Plan.  Subscriber Responsibility Information  None    Restricted Recipient Program  None     Medicare Medicare ID: [id number]  Part A Effective Date:  12/01/2024  Part B Effective Date: 08/01/2024

## 2025-01-27 NOTE — Clinical Note
KELLY No support from CCC team No MRI bill it is resolved. Has support from Jocelyne Huang, RN Care Coordinator from [facility] for support.  Order for Care Management has been closed, no further outreach will be done at this time and patient can be re-referred.

## 2025-02-04 ENCOUNTER — HOSPITAL ENCOUNTER (OUTPATIENT)
Dept: MRI IMAGING | Facility: HOSPITAL | Age: 85
Discharge: HOME | End: 2025-02-04
Attending: STUDENT IN AN ORGANIZED HEALTH CARE EDUCATION/TRAINING PROGRAM
Payer: MEDICARE

## 2025-02-04 ENCOUNTER — HOSPITAL ENCOUNTER (OUTPATIENT)
Dept: CT IMAGING | Facility: HOSPITAL | Age: 85
Discharge: HOME | End: 2025-02-04
Attending: NURSE PRACTITIONER
Payer: MEDICARE

## 2025-02-04 PROCEDURE — 71250 CT THORAX DX C-: CPT

## 2025-02-04 PROCEDURE — 72148 MRI LUMBAR SPINE W/O DYE: CPT

## 2025-02-05 ENCOUNTER — TELEPHONE (OUTPATIENT)
Dept: PHYSICAL MEDICINE AND REHAB | Facility: CLINIC | Age: 85
End: 2025-02-05
Payer: MEDICARE

## 2025-02-05 ENCOUNTER — APPOINTMENT (OUTPATIENT)
Dept: INTERPRETER SERVICES | Facility: CLINIC | Age: 85
End: 2025-02-05
Payer: MEDICARE

## 2025-02-05 NOTE — TELEPHONE ENCOUNTER
----- Message from Eddie Kasper sent at 2/5/2025 12:05 PM CST -----  Lumbar MRI shows arthritis at multiple levels throughout the lower back which would be age-appropriate.  There are some areas of nerve outlet narrowing but no high-grade narrowing of the spinal canal.  Recommend follow-up in clinic to discuss further as well as treatment options.

## 2025-02-05 NOTE — TELEPHONE ENCOUNTER
"[facility]  New Visit Note        HISTORY OF PRESENT ILLNESS     The patient's referring provider is: Satish Sandoval MD    HISTORY OF PRESENT ILLNESS   Homero Brasher is a 66 y.o. male who presents to a [facility] for a sleep medicine evaluation with concerns of Consult and Snoring.     PAST SLEEP HISTORY    Patient has the following sleep-related diagnoses and sleep study results: positive for HAYDEN    CURRENT HISTORY    On today's visit, the patient reports he was previously diagnosed with sleep apnea about 8 to 10 years ago.  Felt the pressure was much too high and cannot tolerate CPAP.  Eventually discontinued use.  No longer has machine.    Has been recommended to be evaluated further for sleep apnea by primary care due to symptoms which include snoring, frequent waking, poor sleep quality.  She noted to have hypertension and is on medications for this.    Denies other sleep issues.    NECK 17.5\"    STOP  3 SOP  BANG 4    Sleep schedule  on weekdays / work days:  Usual Bedtime  1 a  Falls asleep around  130 a  Wake time  9 a    Sleep schedule  on weekends/non work days :  Usual Bedtime  1 a  Falls asleep around 2 a  Wake time  10 a    Naps:   1 hr weekends    Average sleep duration ? hours/day    Preferred sleeping position: ?    Sleep-related ROS:    Sleep Initiation: no problems going to sleep    Sleep Maintenance: wakes frequently to urinate    Breathing during sleep: snoring    Recreational drug use  Smoking: never  Alcohol consumption: occasional  Caffeine consumption:  occasional  Marijuana: never    ESS: 2      PHYSICAL EXAM     VITAL SIGNS: /83   Pulse 96   Ht 1.727 m (5' 8\")   Wt 116 kg (255 lb)   SpO2 96%   BMI 38.77 kg/m²      CURRENT WEIGHT: [unfilled]  BMI: [unfilled]  PREVIOUS WEIGHTS:  Wt Readings from Last 3 Encounters:   05/01/24 116 kg (255 lb)   03/20/24 116 kg (255 lb)   02/20/24 115 kg (254 lb)       PHYSICAL EXAM: " Pt's niece,  Lev Green, returned call. She is primary # on file and is on consent to communicate. Results given. She stated understanding. Transferred to scheduling to make follow-up appointment.    "GENERAL: alert oriented x 3 pleasant and cooperative no acute distress  MODIFIED MALLAMPATI SCORE: 3+  LATERAL PHARYNGEAL WALL: 2+  NECK EXAM: normal supple no adenopathy    RESULTS/DATA     No results found for: \"IRON\", \"TRANSFERRIN\", \"IRONSAT\", \"TIBC\", \"FERRITIN\"    ASSESSMENT/PLAN     Mr. Brasher is a 66 y.o. male and was referred to the [facility] for the following issues:    OBSTRUCTIVE SLEEP APNEA / FREQUENT WAKING  -Ordering sleep study to evaluate  -Discussed lower pressure, EPR, nasal interface may help with tolerance of CPAP    BMI>35  -Body mass index is 38.77 kg/m².  today  -With sufficient weight loss may no longer require treatment for HAYDEN    HYPERTENSION  BP Readings from Last 3 Encounters:   05/01/24 133/83   03/20/24 130/80   02/20/24 113/74      -Well controlled with current medications        "

## 2025-02-05 NOTE — TELEPHONE ENCOUNTER
Call placed to patient with provider's results and recommendations with assistance of [facility] Anamika . Left message to return call.

## 2025-02-10 DIAGNOSIS — R42 DIZZINESS: ICD-10-CM

## 2025-02-10 RX ORDER — MECLIZINE HYDROCHLORIDE 25 MG/1
25 TABLET ORAL 3 TIMES DAILY PRN
Qty: 90 TABLET | Refills: 10 | Status: SHIPPED | OUTPATIENT
Start: 2025-02-10

## 2025-03-19 DIAGNOSIS — F51.01 PRIMARY INSOMNIA: ICD-10-CM

## 2025-03-20 RX ORDER — RAMELTEON 8 MG/1
8 TABLET ORAL AT BEDTIME
Qty: 30 TABLET | Refills: 0 | Status: SHIPPED | OUTPATIENT
Start: 2025-03-20

## 2025-04-01 ENCOUNTER — TELEPHONE (OUTPATIENT)
Dept: FAMILY MEDICINE | Facility: CLINIC | Age: 85
End: 2025-04-01
Payer: MEDICARE

## 2025-04-01 DIAGNOSIS — F51.01 PRIMARY INSOMNIA: ICD-10-CM

## 2025-04-01 RX ORDER — RAMELTEON 8 MG/1
8 TABLET ORAL AT BEDTIME
Qty: 30 TABLET | Refills: 0 | Status: SHIPPED | OUTPATIENT
Start: 2025-04-01

## 2025-04-01 NOTE — TELEPHONE ENCOUNTER
Spoke to pts wife, who states they went to  ramelteon prescription on 3/20/25 and pharmacy told them they did not received 3/20/25 prescription. Writer attempted to call pharmacy, but no answer and was prompted to left message.   Resent prescription and instructed wife to follow-up with pharmacy.     Lilian REYNOLDS RN  [facility]

## 2025-04-02 ENCOUNTER — RADIOLOGY INJECTION OFFICE VISIT (OUTPATIENT)
Dept: PHYSICAL MEDICINE AND REHAB | Facility: CLINIC | Age: 85
End: 2025-04-02
Attending: STUDENT IN AN ORGANIZED HEALTH CARE EDUCATION/TRAINING PROGRAM
Payer: MEDICARE

## 2025-04-02 VITALS
BODY MASS INDEX: 21.53 KG/M2 | HEIGHT: 62 IN | TEMPERATURE: 98.1 F | DIASTOLIC BLOOD PRESSURE: 68 MMHG | OXYGEN SATURATION: 94 % | SYSTOLIC BLOOD PRESSURE: 134 MMHG | HEART RATE: 68 BPM | WEIGHT: 117 LBS | RESPIRATION RATE: 16 BRPM

## 2025-04-02 DIAGNOSIS — M47.816 LUMBAR SPONDYLOSIS: ICD-10-CM

## 2025-04-02 PROCEDURE — 64494 INJ PARAVERT F JNT L/S 2 LEV: CPT | Mod: 50 | Performed by: STUDENT IN AN ORGANIZED HEALTH CARE EDUCATION/TRAINING PROGRAM

## 2025-04-02 PROCEDURE — 64493 INJ PARAVERT F JNT L/S 1 LEV: CPT | Mod: 50 | Performed by: STUDENT IN AN ORGANIZED HEALTH CARE EDUCATION/TRAINING PROGRAM

## 2025-04-02 RX ORDER — LIDOCAINE HYDROCHLORIDE 10 MG/ML
INJECTION, SOLUTION EPIDURAL; INFILTRATION; INTRACAUDAL; PERINEURAL
Status: COMPLETED | OUTPATIENT
Start: 2025-04-02 | End: 2025-04-02

## 2025-04-02 RX ORDER — BUPIVACAINE HYDROCHLORIDE 5 MG/ML
INJECTION, SOLUTION EPIDURAL; INTRACAUDAL; PERINEURAL
Status: COMPLETED | OUTPATIENT
Start: 2025-04-02 | End: 2025-04-02

## 2025-04-02 RX ADMIN — BUPIVACAINE HYDROCHLORIDE 3 ML: 5 INJECTION, SOLUTION EPIDURAL; INTRACAUDAL; PERINEURAL at 14:24

## 2025-04-02 RX ADMIN — LIDOCAINE HYDROCHLORIDE 5 ML: 10 INJECTION, SOLUTION EPIDURAL; INFILTRATION; INTRACAUDAL; PERINEURAL at 14:23

## 2025-04-02 ASSESSMENT — PAIN SCALES - GENERAL
PAINLEVEL_OUTOF10: MODERATE PAIN (5)
PAINLEVEL_OUTOF10: MODERATE PAIN (5)

## 2025-04-02 NOTE — PATIENT INSTRUCTIONS

## 2025-04-07 ENCOUNTER — TRANSFERRED RECORDS (OUTPATIENT)
Dept: HEALTH INFORMATION MANAGEMENT | Facility: CLINIC | Age: 85
End: 2025-04-07
Payer: MEDICARE

## 2025-04-11 ENCOUNTER — TELEPHONE (OUTPATIENT)
Dept: FAMILY MEDICINE | Facility: CLINIC | Age: 85
End: 2025-04-11
Payer: MEDICARE

## 2025-04-11 NOTE — TELEPHONE ENCOUNTER
New Medication Request    Contacts       Contact Date/Time Type Contact Phone/Fax    04/11/2025 01:03 PM CDT Phone (Incoming) [facility] [phone]        What medication are you requesting?: zolpidem tabs, zaleplon tabs, trazadone, belsomra, or raldesy.     Reason for medication request: patient insurance is not going to pay for ramelteon (ROZEREM) 8 MG tablet any longer. Need an alternative. Patient has a 30 day supply of medication right now. Will be out of medication on 5/10.     Have you taken this medication before?: No    Controlled Substance Agreement on file:   CSA -- Patient Level:    CSA: None found at the patient level.         Patient offered an appointment? No    Preferred Pharmacy:  [address] [address]  Phone: [phone] Fax: [phone]      Okay to leave a detailed message?: Yes at Home number on file [phone] (home)

## 2025-04-15 NOTE — TELEPHONE ENCOUNTER
Contacted patient's PCA and relayed message below. C2C on file  Offered sooner appointment, but declined  No further action needed    Monica Finney RN  [facility]    Dr Cohn  Please call patient to inform him that ramelteon is no longer covered by insurance.  We can discuss alternatives at next visit 4/24/25, unless he would like a sooner appointment, please offer sooner appointment

## 2025-04-15 NOTE — TELEPHONE ENCOUNTER
Please call patient to inform him that ramelteon is no longer covered by insurance.  We can discuss alternatives at next visit 4/24/25, unless he would like a sooner appointment, please offer sooner appointment

## 2025-04-15 NOTE — TELEPHONE ENCOUNTER
What medication are you requesting?: zolpidem tabs, zaleplon tabs, trazadone, belsomra, or raldesy.     Reason for medication request: patient insurance is not going to pay for ramelteon (ROZEREM) 8 MG tablet any longer. Need an alternative. Patient has a 30 day supply of medication right now. Will be out of medication on 5/10.     Message routed to Dr Cohn for alternative medication    Monica Finney RN  [facility]     Well appearing, awake, alert, oriented to person, place, time/situation and in no apparent distress. normal...

## 2025-04-24 ENCOUNTER — OFFICE VISIT (OUTPATIENT)
Dept: FAMILY MEDICINE | Facility: CLINIC | Age: 85
End: 2025-04-24
Payer: MEDICARE

## 2025-04-24 VITALS
WEIGHT: 121 LBS | HEIGHT: 60 IN | OXYGEN SATURATION: 92 % | RESPIRATION RATE: 15 BRPM | TEMPERATURE: 97.6 F | DIASTOLIC BLOOD PRESSURE: 58 MMHG | HEART RATE: 75 BPM | SYSTOLIC BLOOD PRESSURE: 104 MMHG | BODY MASS INDEX: 23.75 KG/M2

## 2025-04-24 DIAGNOSIS — R13.19 OTHER DYSPHAGIA: ICD-10-CM

## 2025-04-24 DIAGNOSIS — R94.2 RESTRICTIVE PATTERN PRESENT ON PULMONARY FUNCTION TESTING: ICD-10-CM

## 2025-04-24 DIAGNOSIS — Z23 NEED FOR VACCINATION: ICD-10-CM

## 2025-04-24 DIAGNOSIS — M47.816 LUMBAR SPONDYLOSIS: ICD-10-CM

## 2025-04-24 DIAGNOSIS — R26.9 IMPAIRED GAIT: ICD-10-CM

## 2025-04-24 DIAGNOSIS — G47.00 INSOMNIA, UNSPECIFIED TYPE: Primary | ICD-10-CM

## 2025-04-24 ASSESSMENT — ANXIETY QUESTIONNAIRES: GAD7 TOTAL SCORE: INCOMPLETE

## 2025-04-24 NOTE — PROGRESS NOTES
Prior to immunization administration, verified patients identity using patient s name and date of birth. Please see Immunization Activity for additional information.     Screening Questionnaire for Adult Immunization    Are you sick today?   No   Do you have allergies to medications, food, a vaccine component or latex?   No   Have you ever had a serious reaction after receiving a vaccination?   No   Do you have a long-term health problem with heart, lung, kidney, or metabolic disease (e.g., diabetes), asthma, a blood disorder, no spleen, complement component deficiency, a cochlear implant, or a spinal fluid leak?  Are you on long-term aspirin therapy?   No   Do you have cancer, leukemia, HIV/AIDS, or any other immune system problem?   No   Do you have a parent, brother, or sister with an immune system problem?   No   In the past 3 months, have you taken medications that affect  your immune system, such as prednisone, other steroids, or anticancer drugs; drugs for the treatment of rheumatoid arthritis, Crohn s disease, or psoriasis; or have you had radiation treatments?   No   Have you had a seizure, or a brain or other nervous system problem?   No   During the past year, have you received a transfusion of blood or blood    products, or been given immune (gamma) globulin or antiviral drug?   No   For women: Are you pregnant or is there a chance you could become       pregnant during the next month?   No   Have you received any vaccinations in the past 4 weeks?   No     Immunization questionnaire answers were all negative.      Patient instructed to remain in clinic for 15 minutes afterwards, and to report any adverse reactions.     Screening performed by Renetta Willis CMA on 4/24/2025 at 2:27 PM.        Answers submitted by the patient for this visit:  Patient Health Questionnaire (Submitted on 4/24/2025)  PHQ9 TOTAL SCORE: Incomplete  Patient Health Questionnaire (G7) (Submitted on 4/24/2025)  ROMEO 7 TOTAL SCORE:  Incomplete

## 2025-04-24 NOTE — PROGRESS NOTES
***  Teodoro Gregorio is a 84 year old M, former smoker, with history of chronic back pain, MDD presenting today for evaluation of wheeze.   The patient did not engage much during the visit. His family member who he lives with answered most questions via the . Denies hx of asthma or COPD. Former pipe smoker ~70 years, quit in 2015. Has noticed wheeze and chest tightness over the past year. Albuterol has been occasionally helpful. Denies other breathing symptoms but family reports they feel he is occasionally aspirating or having swallow difficulty.      PFTs should be interpreted with caution as he had difficulty doing the test but there does appear to be restriction with a flattened inspiratory limb.   VSS unremarkable. Recent CXR normal.   Lung exam and SpO2 are normal today.      #. Restriction on PFTs, difficulty swallowing: Given decreased FEV1 and FVC with flattened inspiratory limb we will investigate extrathoracic cause for symptoms.    ENT referral   Hi-res CT chest   #. Wheeze: As we have not tried a ICS yet we will do so to eliminate any possible underlying asthma. Unlikely given normal ratio of PFTs but the test may not be accurate and he has found albuterol helpful.   Symbicort (or equivalent ICS/LABA), as prescribed. Use daily no matter what for at least 1 month. If helpful continue use.   Albuterol prn   #. HCM: UTD with COVID vaccine, seasonal flu, PCV 13, and PPSV 23    CT high res 2/4/25:  1.  Exam compromised by motion artifact.  2.  Minimal peripheral and paravertebral reticulation, suggesting minimal scarring or fibrosis, though not suggesting fibrotic interstitial lung disease at this time.  3.  Mild pulmonary trunk enlargement; correlate for pulmonary hypertension.    - No pulmonology follow-up***

## 2025-04-24 NOTE — PROGRESS NOTES
Preventive Care Visit  [facility]  Tricia Cohn MD, Family Medicine  Apr 24, 2025      Teodoro was seen today for wellness visit.    Diagnoses and all orders for this visit:    Insomnia, unspecified type  Comments:  Insurance no longer cover ramelteon. Trial melatonin 3-6mg. RTC 1 month.  Orders:  -     melatonin 3 MG tablet; Take 1-2 tablets (3-6 mg) by mouth nightly as needed for sleep.    Lumbar spondylosis  Comments:  Follows with Phys Med. S/p injection 4/2. Declined anymore or referralt o Pain Med. Will consider if pain worsens.    Other dysphagia  Comments:  Swallow study 5/3/24 normal.  Reported eating large bites/sips and fast pace.  Education provided.    Restrictive pattern present on pulmonary function testing  Comments:  due to follow up with Pulm, had recent high res CT.    Need for vaccination  -     COVID-19 12+ (PFIZER)    Impaired gait  Comments:  Ambulates with cane. Declined PT.      The longitudinal plan of care for the diagnosis(es)/condition(s) as documented were addressed during this visit. Due to the added complexity in care, I will continue to support Ed in the subsequent management and with ongoing continuity of care.      Subjective   Teodoro is a 85 year old, presenting for the following:  Wellness Visit        4/24/2025     2:22 PM   Additional Questions   Roomed by Tia   Accompanied by PCA worker         HPI    Lumbar spondylosis  Follows with Physical Medicine.  S/p injection 4/2/2025.  Has another scheduled for future.  Doesn't want injection or meds.   Declined Pain Med referral at this time. Will consider in the future if pain is severe.    Insomnia        4/22/2024    10:44 AM 7/22/2024     1:36 PM 1/24/2025     2:16 PM   PHQ   PHQ-9 Total Score 11 5 3    Q9: Thoughts of better off dead/self-harm past 2 weeks Not at all  Not at all  Not at all        Proxy-reported           Advance Care Planning  Health Care Directive received at today's visit.  Forwarded  to [facility].        4/24/2025   General Health   How would you rate your overall physical health? Good   Feel stress (tense, anxious, or unable to sleep) Patient declined         4/24/2025   Nutrition   Diet: Regular (no restrictions)         4/22/2024   Exercise   Days per week of moderate/strenous exercise 3 days   Average minutes spent exercising at this level 20 min         4/24/2025   Social Factors   Worry food won't last until get money to buy more No   Food not last or not have enough money for food? No   Do you have housing? (Housing is defined as stable permanent housing and does not include staying outside in a car, in a tent, in an abandoned building, in an overnight shelter, or couch-surfing.) Yes   Are you worried about losing your housing? No   Lack of transportation? No   Unable to get utilities (heat,electricity)? No         4/24/2025   Fall Risk   Fallen 2 or more times in the past year? No    Trouble with walking or balance? Yes        Proxy-reported           4/24/2025   Activities of Daily Living- Home Safety   Needs help with the following daily activites Telephone use    Transportation    Shopping    Preparing meals    Housework    Bathing    Laundry    Medication administration    Money management    Toileting    Feeding    Dressing   Safety concerns in the home None of the above       Multiple values from one day are sorted in reverse-chronological order         4/24/2025   Dental   Dentist two times every year? (!) NO         4/24/2025   Hearing Screening   Hearing concerns? (!) I FEEL THAT PEOPLE ARE MUMBLING OR NOT SPEAKING CLEARLY.    (!) I NEED TO ASK PEOPLE TO SPEAK UP OR REPEAT THEMSELVES.    (!) IT'S HARDER TO UNDERSTAND WOMEN'S VOICES THAN MEN'S VOICES.    (!) IT'S HARD TO FOLLOW A CONVERSATION IN A NOISY RESTAURANT OR CROWDED ROOM.    (!) TROUBLE UNDESTANDING A SPEAKER IN A PUBLIC MEETING OR [employer] SERVICE.    (!) TROUBLE FOLLOWING DIALOGUE IN THE THEATHER.    (!)  TROUBLE UNDERSTANDING SOFT OR WHISPERED SPEECH.    (!) TROUBLE UNDERSTANDING SPEECH ON THE TELEPHONE       Multiple values from one day are sorted in reverse-chronological order         4/24/2025   Driving Risk Screening   Patient/family members have concerns about driving No         4/24/2025   General Alertness/Fatigue Screening   Have you been more tired than usual lately? (!) DECLINE         4/24/2025   Urinary Incontinence Screening   Bothered by leaking urine in past 6 months No       Today's PHQ-9 Score:       4/24/2025     2:27 PM   PHQ-9 SCORE   PHQ-9 Total Score MyChart Incomplete         4/24/2025   Substance Use   Alcohol more than 3/day or more than 7/wk Not Applicable   Do you have a current opioid prescription? No   How severe/bad is pain from 1 to 10? 0/10 (No Pain)   Do you use any other substances recreationally? No     Social History     Tobacco Use    Smoking status: Former     Types: Cigarettes    Smokeless tobacco: Current     Types: Chew    Tobacco comments:     betel nut   Vaping Use    Vaping status: Never Used   Substance Use Topics    Alcohol use: No    Drug use: No             Reviewed and updated as needed this visit by Provider                      Current providers sharing in care for this patient include:  Patient Care Team:  Tricia Cohn MD as PCP - General (Family Medicine)  Tricia Cohn MD as Assigned PCP  Lisa Sun APRN CNP as Pulmonologist (Pulmonary Disease)  Lisa Sun APRN CNP as Assigned Pulmonology Provider  Eddie Kasper MD as Assigned Neuroscience Provider  LIOR Gimenez as Care Manager    The following health maintenance items are reviewed in Epic and correct as of today:  Health Maintenance   Topic Date Due    DEPRESSION ACTION PLAN  Never done    ZOSTER IMMUNIZATION (1 of 2) Never done    RSV VACCINE (1 - 1-dose 75+ series) Never done    DEPRESSION 12 MO INDEX REPEAT PHQ-9  02/21/2025    COVID-19 Vaccine (9 - 2024-25 season) 04/22/2025    MEDICARE ANNUAL WELLNESS  "VISIT  04/22/2025    PHQ-9  07/24/2025    ANNUAL REVIEW OF HM ORDERS  01/24/2026    DTAP/TDAP/TD IMMUNIZATION (2 - Td or Tdap) 04/07/2026    FALL RISK ASSESSMENT  04/24/2026    ADVANCE CARE PLANNING  04/22/2029    INFLUENZA VACCINE  Completed    Pneumococcal Vaccine: 50+ Years  Completed    HPV IMMUNIZATION  Aged Out    MENINGITIS IMMUNIZATION  Aged Out            Objective    Exam  /58 (BP Location: Left arm, Patient Position: Sitting, Cuff Size: Adult Regular)   Pulse 75   Temp 97.6  F (36.4  C) (Temporal)   Resp 15   Ht 1.516 m (4' 11.69\")   Wt 54.9 kg (121 lb)   SpO2 92%   BMI 23.88 kg/m     Estimated body mass index is 23.88 kg/m  as calculated from the following:    Height as of this encounter: 1.516 m (4' 11.69\").    Weight as of this encounter: 54.9 kg (121 lb).    Physical Exam  General: Well developed, well nourished.  Skin:  Dry without rash.    Head:  Normocephalic-atraumatic.    Eye:  Normal conjunctivae.     Respiratory:  Normal respiratory effort.   Gastrointestinal:  Non-distended.    Musculoskeletal:  No deformity or edema.  Neurologic: No focal deficits.          4/24/2025   Mini Cog   Mini-Cog Not Completed (choose reason) Patient declines             Vision Screen        Signed Electronically by: Tricia Cohn MD    Answers submitted by the patient for this visit:  Patient Health Questionnaire (Submitted on 4/24/2025)  PHQ9 TOTAL SCORE: Incomplete  Patient Health Questionnaire (G7) (Submitted on 4/24/2025)  ROMEO 7 TOTAL SCORE: Incomplete    "

## 2025-05-24 NOTE — PROGRESS NOTES
[facility]     Emergency Department     Faculty Attestation    I performed a history and physical examination of the patient and discussed management with the resident. I have reviewed and agree with the resident’s findings including all diagnostic interpretations, and treatment plans as written. Any areas of disagreement are noted on the chart. I was personally present for the key portions of any procedures. I have documented in the chart those procedures where I was not present during the key portions. I have reviewed the emergency nurses triage note. I agree with the chief complaint, past medical history, past surgical history, allergies, medications, social and family history as documented unless otherwise noted below. Documentation of the HPI, Physical Exam and Medical Decision Making performed by [email or web address] is based on my personal performance of the HPI, PE and MDM. For Physician Assistant/ Nurse Practitioner cases/documentation I have personally evaluated this patient and have completed at least one if not all key elements of the E/M (history, physical exam, and MDM). Additional findings are as noted.    Note Started: 2:53 AM EDT     27 yo F sob, no fever, hx asthma, feels like typical asthma exacerbation  Perc-  PE vss gcs 15   patient speaking complete sentences, bilateral expiratory wheezes,  ---cxr -, discharged in satisfactory condition,   EKG Interpretation    Interpreted by me      CRITICAL CARE: There was a high probability of clinically significant/life threatening deterioration in this patient's condition which required my urgent intervention.  Total critical care time was 0 minutes.  This excludes any time for separately reportable procedures.       Blade Hurtado DO  05/24/25 0255       Blade Aleman DO  05/24/25 0612     "  Mental Health Visit Note    12/9/2019    Start time: 3:06pm    Stop Time: 3:48pm   Session # 2    Session Type: Patient is presenting for an Individual session.    Teodoro Gregorio is a 79 y.o. male is being seen today for    Chief Complaint   Patient presents with      Follow Up     coping with sadness about decrease contact with children. Establishing goals for therapy.       New symptoms or complaints: None    Functional Impairment:   Personal: 4  Family: 3  Work: 4  Social:4    Clinical assessment of mental status: Reviewed. MSE is current effective 12/9/19  Grooming: Well groomed  Attire: Appropriate  Age: Appears Stated  Behavior Towards Examiner: guarded at times, increased verbal engagement.   Motor Activity: slow   Eye Contact: fleeting  Mood: Depressed  Affect: Depressed  Speech/Language: Soft and raspy  Attention: Distractible  Concentration: Brief  Thought Process: Within normal  Thought Content: Hallucinations: Within noraml  Delusions: Within normal  Orientation: X 3  Memory: Impairment  Judgement: No Evidence of Impairment  Estimated Intelligence: Below Average  Demonstrated Insight: Diminished  Fund of Knowledge: adequate      Suicidal/Homicidal Ideation present: None Reported This Session.      Patient's impression of their current status:  Patient reports he is feeling \"sometimes good, sometimes bad.\" He reports bothersome thoughts, but he \"doesn't think about them as much as before- it has gotten better.\" States he \"no longer is thinking about his daughters.\"   Patient endorses the following symptoms: body feels heavy. Heart feels good. Not satisfied with family relationships.   Sleep: problems- can't fall asleep sometimes. Denies nightmares.   Depressed mood:  Don't feel like laughing.  Anxiety/Worries: Denies worries. Also states he worries about falling, especially in winter.  Pain: Every day in his low back. Already completed PT and doing the exercises at home and using cane for support.  Rates " "pain 4/10.  Engagement in activities: Not going to [employer] due to cold weather, body aches. Staying home only.  Medication Adherence: good. PCA assists with medications.  \"I don't want to talk about things because it is hard due to being hard of hearing.\" Not wearing hearing aids. Refusing to follow up with audiologist or wear hearing aids. Last audiology appt was 2017 he reports.   States he wants to attend psychotherapy.    Therapist impression of patients current state: Patient presented for individual psychotherapy session. His PCA accompanies him.  A professional female, Anamika  from [employer], Torie Marcus ([id number]), was present for the visit due to the language complexity.  Patient reports conflicting statements at times.  He continues to struggle with some engagement in therapy due to being guarded and just not wanting to talk about things.  They he was more open about that and states a lot of it has to maybe do with his hearing concerns.  He is not wanting to address these concerns.  A lot of time was spent on discussing what psychotherapy is and how being engaged and talking about things is a big part of our work together.  Today he states he does not want to talk about things, but states he wants to continue coming to therapy with this provider.  Therapist discussed how therapy is well voluntary and he can choose to end the service at any time if he does not feel interested in being engaged in it or does not find it to be effective.  At this time he wants to continue.  He comes today with reporting a big change in his thought process.  Therapist is unsure if there is actually the significant change or if he just does not want to talk about things.  Previously, he was thinking about his relationship with his daughters every day and this was a significant loss for him and today he suddenly states he is not thinking about it anymore.  It may take more time with this patient to develop trusting " relationship and develop comfort around the therapeutic process as it is new to him.  We explored what he would like to get out of therapy and developed a long-term treatment plan today. View  Treatment Plan document in Epic.   CC note to PCP, Merary Montes.    Type of psychotherapeutic technique provided: Insight oriented, Client centered and CBT, solution focused    Progress toward short term goals:Progress as expected, desire to continue therapy. created comprehensive treatment plan today.    Review of long term goals: Treatment Plan updated. Effective 12/9/2019-03/9/2020.    Diagnosis:   1. Adjustment disorder with mixed anxiety and depressed mood        Plan and Follow up: Patient is scheduled for follow up psychotherapy on 12/30/19.      Discharge Criteria/Planning: Patient will continue with follow-up until therapy can be discontinued without return of signs and symptoms.    Samantha Velazco [facility] 12/9/2019

## 2025-06-02 ENCOUNTER — OFFICE VISIT (OUTPATIENT)
Dept: FAMILY MEDICINE | Facility: CLINIC | Age: 85
End: 2025-06-02
Payer: MEDICARE

## 2025-06-02 VITALS
HEART RATE: 70 BPM | RESPIRATION RATE: 18 BRPM | TEMPERATURE: 97.9 F | BODY MASS INDEX: 19.63 KG/M2 | DIASTOLIC BLOOD PRESSURE: 64 MMHG | WEIGHT: 104 LBS | SYSTOLIC BLOOD PRESSURE: 104 MMHG | HEIGHT: 61 IN | OXYGEN SATURATION: 95 %

## 2025-06-02 DIAGNOSIS — Z00.00 ENCOUNTER FOR MEDICARE ANNUAL WELLNESS EXAM: Primary | ICD-10-CM

## 2025-06-02 DIAGNOSIS — R94.2 RESTRICTIVE PATTERN PRESENT ON PULMONARY FUNCTION TESTING: ICD-10-CM

## 2025-06-02 DIAGNOSIS — R06.2 WHEEZING: ICD-10-CM

## 2025-06-02 DIAGNOSIS — R63.4 UNINTENTIONAL WEIGHT LOSS: ICD-10-CM

## 2025-06-02 DIAGNOSIS — H90.5 SENSORINEURAL HEARING LOSS (SNHL), UNSPECIFIED LATERALITY: ICD-10-CM

## 2025-06-02 DIAGNOSIS — Z23 NEED FOR VACCINATION: ICD-10-CM

## 2025-06-02 DIAGNOSIS — M47.816 LUMBAR SPONDYLOSIS: ICD-10-CM

## 2025-06-02 DIAGNOSIS — M51.360 DEGENERATION OF INTERVERTEBRAL DISC OF LUMBAR REGION WITH DISCOGENIC BACK PAIN: ICD-10-CM

## 2025-06-02 DIAGNOSIS — M54.2 NECK PAIN: ICD-10-CM

## 2025-06-02 DIAGNOSIS — R63.0 DECREASED APPETITE: ICD-10-CM

## 2025-06-02 DIAGNOSIS — F33.0 MILD EPISODE OF RECURRENT MAJOR DEPRESSIVE DISORDER: ICD-10-CM

## 2025-06-02 DIAGNOSIS — R21 RASH: ICD-10-CM

## 2025-06-02 DIAGNOSIS — G47.00 INSOMNIA, UNSPECIFIED TYPE: ICD-10-CM

## 2025-06-02 PROCEDURE — G0439 PPPS, SUBSEQ VISIT: HCPCS | Performed by: STUDENT IN AN ORGANIZED HEALTH CARE EDUCATION/TRAINING PROGRAM

## 2025-06-02 PROCEDURE — G2211 COMPLEX E/M VISIT ADD ON: HCPCS | Performed by: STUDENT IN AN ORGANIZED HEALTH CARE EDUCATION/TRAINING PROGRAM

## 2025-06-02 PROCEDURE — T1013 SIGN LANG/ORAL INTERPRETER: HCPCS | Mod: U4

## 2025-06-02 PROCEDURE — 3078F DIAST BP <80 MM HG: CPT | Performed by: STUDENT IN AN ORGANIZED HEALTH CARE EDUCATION/TRAINING PROGRAM

## 2025-06-02 PROCEDURE — 3074F SYST BP LT 130 MM HG: CPT | Performed by: STUDENT IN AN ORGANIZED HEALTH CARE EDUCATION/TRAINING PROGRAM

## 2025-06-02 PROCEDURE — 99215 OFFICE O/P EST HI 40 MIN: CPT | Mod: 25 | Performed by: STUDENT IN AN ORGANIZED HEALTH CARE EDUCATION/TRAINING PROGRAM

## 2025-06-02 RX ORDER — MELOXICAM 7.5 MG/1
7.5 TABLET ORAL DAILY
Qty: 30 TABLET | Refills: 1 | Status: SHIPPED | OUTPATIENT
Start: 2025-06-02

## 2025-06-02 RX ORDER — ALBUTEROL SULFATE 90 UG/1
2 INHALANT RESPIRATORY (INHALATION) EVERY 6 HOURS PRN
Qty: 18 G | Refills: 3 | Status: SHIPPED | OUTPATIENT
Start: 2025-06-02

## 2025-06-02 RX ORDER — ACETAMINOPHEN 500 MG
500-1000 TABLET ORAL EVERY 8 HOURS PRN
Qty: 90 TABLET | Refills: 3 | Status: SHIPPED | OUTPATIENT
Start: 2025-06-02

## 2025-06-02 RX ORDER — MIRTAZAPINE 30 MG/1
30 TABLET, FILM COATED ORAL AT BEDTIME
Qty: 60 TABLET | Refills: 1 | Status: SHIPPED | OUTPATIENT
Start: 2025-06-02

## 2025-06-02 RX ORDER — MULTIVITAMIN
1 TABLET ORAL DAILY
Qty: 90 TABLET | Refills: 3 | Status: SHIPPED | OUTPATIENT
Start: 2025-06-02

## 2025-06-02 SDOH — HEALTH STABILITY: PHYSICAL HEALTH: ON AVERAGE, HOW MANY DAYS PER WEEK DO YOU ENGAGE IN MODERATE TO STRENUOUS EXERCISE (LIKE A BRISK WALK)?: 4 DAYS

## 2025-06-02 SDOH — HEALTH STABILITY: PHYSICAL HEALTH: ON AVERAGE, HOW MANY MINUTES DO YOU ENGAGE IN EXERCISE AT THIS LEVEL?: 20 MIN

## 2025-06-02 ASSESSMENT — ANXIETY QUESTIONNAIRES
GAD7 TOTAL SCORE: 2
3. WORRYING TOO MUCH ABOUT DIFFERENT THINGS: NOT AT ALL
1. FEELING NERVOUS, ANXIOUS, OR ON EDGE: SEVERAL DAYS
8. IF YOU CHECKED OFF ANY PROBLEMS, HOW DIFFICULT HAVE THESE MADE IT FOR YOU TO DO YOUR WORK, TAKE CARE OF THINGS AT HOME, OR GET ALONG WITH OTHER PEOPLE?: NOT DIFFICULT AT ALL
7. FEELING AFRAID AS IF SOMETHING AWFUL MIGHT HAPPEN: NOT AT ALL
6. BECOMING EASILY ANNOYED OR IRRITABLE: SEVERAL DAYS
IF YOU CHECKED OFF ANY PROBLEMS ON THIS QUESTIONNAIRE, HOW DIFFICULT HAVE THESE PROBLEMS MADE IT FOR YOU TO DO YOUR WORK, TAKE CARE OF THINGS AT HOME, OR GET ALONG WITH OTHER PEOPLE: NOT DIFFICULT AT ALL
2. NOT BEING ABLE TO STOP OR CONTROL WORRYING: NOT AT ALL
7. FEELING AFRAID AS IF SOMETHING AWFUL MIGHT HAPPEN: NOT AT ALL
5. BEING SO RESTLESS THAT IT IS HARD TO SIT STILL: NOT AT ALL
4. TROUBLE RELAXING: NOT AT ALL

## 2025-06-02 ASSESSMENT — SOCIAL DETERMINANTS OF HEALTH (SDOH): HOW OFTEN DO YOU GET TOGETHER WITH FRIENDS OR RELATIVES?: ONCE A WEEK

## 2025-06-02 ASSESSMENT — PATIENT HEALTH QUESTIONNAIRE - PHQ9
SUM OF ALL RESPONSES TO PHQ QUESTIONS 1-9: 7
SUM OF ALL RESPONSES TO PHQ QUESTIONS 1-9: 7
10. IF YOU CHECKED OFF ANY PROBLEMS, HOW DIFFICULT HAVE THESE PROBLEMS MADE IT FOR YOU TO DO YOUR WORK, TAKE CARE OF THINGS AT HOME, OR GET ALONG WITH OTHER PEOPLE: NOT DIFFICULT AT ALL

## 2025-06-02 NOTE — PROGRESS NOTES
***  Insomnia, unspecified type  Comments:  Insurance no longer cover ramelteon. Trial melatonin 3-6mg. RTC 1 month.  Orders:  -     melatonin 3 MG tablet; Take 1-2 tablets (3-6 mg) by mouth nightly as needed for sleep.***Not covered by insurance, recommend buying OTC.     Lumbar spondylosis  Comments:  Follows with Phys Med. S/p injection 4/2. Declined anymore or referralt o Pain Med. Will consider if pain worsens.     Other dysphagia  Comments:  Swallow study 5/3/24 normal.  Reported eating large bites/sips and fast pace.  Education provided.     Restrictive pattern present on pulmonary function testing  Pulmonology 7/9/2024:  #. Restriction on PFTs, difficulty swallowing: Given decreased FEV1 and FVC with flattened inspiratory limb we will investigate extrathoracic cause for symptoms.    ENT referral   Hi-res CT chest   #. Wheeze: As we have not tried a ICS yet we will do so to eliminate any possible underlying asthma. Unlikely given normal ratio of PFTs but the test may not be accurate and he has found albuterol helpful.   Symbicort (or equivalent ICS/LABA), as prescribed. Use daily no matter what for at least 1 month. If helpful continue use.   Albuterol prn   #. HCM: UTD with COVID vaccine, seasonal flu, PCV 13, and PPSV 23    Declined seeing ENT***        RTC in 2 months for inhaler recheck  Comments:  due to follow up with Pulm, had recent high res CT.    High-res CT 2/4/2025:  IMPRESSION:   1.  Exam compromised by motion artifact.  2.  Minimal peripheral and paravertebral reticulation, suggesting minimal scarring or fibrosis, though not suggesting fibrotic interstitial lung disease at this time  3.  Mild pulmonary trunk enlargement; correlate for pulmonary hypertension.     Need for vaccination  -     COVID-19 12+ (PFIZER)     Impaired gait  Comments:  Ambulates with cane. Declined PT.***

## 2025-06-02 NOTE — PROGRESS NOTES
Preventive Care Visit  [facility]  Tricia Cohn MD, Family Medicine  Jun 2, 2025      Teodoro was seen today for wellness visit.    Diagnoses and all orders for this visit:    Encounter for Medicare annual wellness exam    Restrictive pattern present on pulmonary function testing  Restriction on PFT Follows with pulmonology seen 7/9/2024.  Referred to ENT.  High-resolution CT done 4/2025 -minimal scarring/fibrosis, not suggesting fibrotic interstitial lung disease at this time.  No pulmonology follow-up yet.  On Symbicort and albuterol as needed.  Did not make appointment with ENT.  Patient reports no concerns currently.  -     albuterol (PROAIR HFA/PROVENTIL HFA/VENTOLIN HFA) 108 (90 Base) MCG/ACT inhaler; Inhale 2 puffs into the lungs every 6 hours as needed for shortness of breath, wheezing or cough.    Mild episode of recurrent major depressive disorder  Insomnia, unspecified type  Comments:  Depression is mild and stable.    Insurance no longer covered by insurance.   Melatonin not covered. Can't afford OTC.   Trial increased mirtazpine.  Orders:  -     mirtazapine (REMERON) 30 MG tablet; Take 1 tablet (30 mg) by mouth at bedtime.  - Declined therapy    Unintentional weight loss  Decreased appetite  Comments:  Initially had decreased appetite, was started on mirtazapine.  Has lost approximately 15 pounds in a little over a month.  Denies any abdominal pain or dysphagia.  Reports that he just does not have an appetite and so does not eat.  Does not think mental health is contributing.  Discussed importance of eating especially high-protein.  Will increase mirtazepine.  RTC 1 month for weight recheck.  If not improved, start workup for unintentional weight loss.  Orders:  -     mirtazapine (REMERON) 30 MG tablet; Take 1 tablet (30 mg) by mouth at bedtime.  -     multivitamin (ONE-DAILY) tablet; Take 1 tablet by mouth daily.  RTC 1 month    Lumbar spondylosis  follows with physical medicine.   S/p injection 4/2/2025.  Stable.  Refill medication.  -     meloxicam (MOBIC) 7.5 MG tablet; Take 1 tablet (7.5 mg) by mouth daily.  -     acetaminophen (TYLENOL) 500 MG tablet; Take 1-2 tablets (500-1,000 mg) by mouth every 8 hours as needed for mild pain.    Rash  Comments:  On back of neck, suspect dermatitis.  Discussed avoiding possible triggers.  Steroid cream, discussed risks.  Orders:  -     Adult Dermatology  Referral; Future    Need for vaccination  -     zoster vaccine recombinant adjuvanted (SHINGRIX) injection; Inject 0.5 mLs into the muscle once for 1 dose. Pharmacist administered  -     RSV vaccine, bivalent, ABRYSVO, injection; Inject 0.5 mLs into the muscle once for 1 dose. Pharmacist administered    Neck pain  Likely muscular.  X-ray reviewed as noted below, however reports that pain is paraspinal, not midline.  Denies tingling or numbness of the bilateral upper extremities.  ROM intact.  Referred to PT.  -     Physical Therapy  Referral; Future  - Meloxicam and Tylenol as needed    Sensorineural hearing loss (SNHL), unspecified laterality  Stable.  Accompanied by daughter.    Other orders  -     PRIMARY CARE FOLLOW-UP SCHEDULING; Future      40 minutes spent precharting, face-to-face, and documentation on acute and chronic problems as noted above in addition to preventative visit.    The longitudinal plan of care for the diagnosis(es)/condition(s) as documented were addressed during this visit. Due to the added complexity in care, I will continue to support Ed in the subsequent management and with ongoing continuity of care.          Ivana Valerio is a 85 year old, presenting for the following:  Wellness Visit        6/2/2025     2:13 PM   Additional Questions   Roomed by hser   Accompanied by pca          HPI        7/22/2024     1:36 PM 1/24/2025     2:16 PM 6/2/2025     2:18 PM   PHQ   PHQ-9 Total Score 5 3  7    Q9: Thoughts of better off dead/self-harm past 2 weeks  Not at all  Not at all  Not at all        Proxy-reported     Didn't like therapy.       Neck Pain  Pain is BL paraspinal muscles.   No numbness or tingling of BUE.  XR 11/15/24   IMPRESSION: Preserved vertebral body heights. Loss of disc height, opposing endplate degenerative change at C4-C5, C5-C6, C6-C7. Trace C3-C4 anterolisthesis which is slightly exaggerated in flexion and slightly reduced in extension. Otherwise preserved   vertebral body alignment. Multilevel bilateral facet, uncovertebral osteoarthrosis. No prevertebral edema. Nonfocal extraspinal structures.  Would like massage     Weight  Wt Readings from Last 4 Encounters:   06/02/25 47.2 kg (104 lb)   04/24/25 54.9 kg (121 lb)   04/02/25 53.1 kg (117 lb)   03/07/25 53.1 kg (117 lb)   Just feels like he doesn't want to eat.   Denies any pain or difficulty swallowing.         Advance Care Planning  Discussed advance care planning with patient; informed AVS has link to [facility].        6/2/2025   General Health   How would you rate your overall physical health? (!) FAIR   Feel stress (tense, anxious, or unable to sleep) To some extent   (!) STRESS CONCERN      6/2/2025   Nutrition   Diet: Regular (no restrictions)         6/2/2025   Exercise   Days per week of moderate/strenous exercise 4 days   Average minutes spent exercising at this level 20 min         6/2/2025   Social Factors   Frequency of gathering with friends or relatives Once a week   Worry food won't last until get money to buy more No   Food not last or not have enough money for food? No   Do you have housing? (Housing is defined as stable permanent housing and does not include staying outside in a car, in a tent, in an abandoned building, in an overnight shelter, or couch-surfing.) Yes   Are you worried about losing your housing? No   Lack of transportation? No   Unable to get utilities (heat,electricity)? No         6/2/2025   Fall Risk   Fallen 2 or more times in the past year? No     Trouble with walking or balance? Yes    Gait Speed Test (Document in seconds) 5.9   Gait Speed Test Interpretation Greater than 5.01 seconds - ABNORMAL       Proxy-reported          6/2/2025   Activities of Daily Living- Home Safety   Needs help with the following daily activites Telephone use    Transportation    Shopping    Preparing meals    Housework    Bathing    Laundry    Medication administration    Money management    Toileting    Feeding    Dressing   Safety concerns in the home None of the above       Multiple values from one day are sorted in reverse-chronological order         6/2/2025   Dental   Dentist two times every year? (!) NO         6/2/2025   Hearing Screening   Hearing concerns? (!) I FEEL THAT PEOPLE ARE MUMBLING OR NOT SPEAKING CLEARLY.    (!) I NEED TO ASK PEOPLE TO SPEAK UP OR REPEAT THEMSELVES.    (!) IT'S HARD TO FOLLOW A CONVERSATION IN A NOISY RESTAURANT OR CROWDED ROOM.    (!) TROUBLE UNDERSTANDING SOFT OR WHISPERED SPEECH.       Multiple values from one day are sorted in reverse-chronological order         6/2/2025   Driving Risk Screening   Patient/family members have concerns about driving No         6/2/2025   General Alertness/Fatigue Screening   Have you been more tired than usual lately? (!) YES         6/2/2025   Urinary Incontinence Screening   Bothered by leaking urine in past 6 months Yes       Today's PHQ-9 Score:       6/2/2025     2:18 PM   PHQ-9 SCORE   PHQ-9 Total Score MyChart 7 (Mild depression)   PHQ-9 Total Score 7        Proxy-reported         6/2/2025   Substance Use   Alcohol more than 3/day or more than 7/wk No   Do you have a current opioid prescription? No   How severe/bad is pain from 1 to 10? 3/10   Do you use any other substances recreationally? No     Social History     Tobacco Use    Smoking status: Former     Types: Cigarettes    Smokeless tobacco: Current     Types: Chew    Tobacco comments:     betel nut   Vaping Use    Vaping status: Never Used  "  Substance Use Topics    Alcohol use: No    Drug use: No       Reviewed and updated as needed this visit by Provider                    Current providers sharing in care for this patient include:  Patient Care Team:  Tricia Cohn MD as PCP - General (Family Medicine)  Tricia Cohn MD as Assigned PCP  Lisa Sun APRN CNP as Pulmonologist (Pulmonary Disease)  Lisa Sun APRN CNP as Assigned Pulmonology Provider  Eddie Kasper MD as Assigned Neuroscience Provider  LIOR Gimenez as Care Manager    The following health maintenance items are reviewed in Epic and correct as of today:  Health Maintenance   Topic Date Due    DEPRESSION ACTION PLAN  Never done    ZOSTER VACCINE (1 of 2) Never done    RSV VACCINE (1 - 1-dose 75+ series) Never done    DEPRESSION 12 MO INDEX REPEAT PHQ-9  06/16/2025    PHQ-9  12/02/2025    ANNUAL REVIEW OF HM ORDERS  01/24/2026    DTAP/TDAP/TD VACCINE (2 - Td or Tdap) 04/07/2026    MEDICARE ANNUAL WELLNESS VISIT  06/02/2026    FALL RISK ASSESSMENT  06/02/2026    ADVANCE CARE PLANNING  06/02/2030    INFLUENZA VACCINE  Completed    PNEUMOCOCCAL VACCINE 50+ YEARS  Completed    COVID-19 VACCINE  Completed    HPV VACCINE  Aged Out    MENINGITIS VACCINE  Aged Out          Objective    Exam  /64 (BP Location: Right arm, Patient Position: Sitting, Cuff Size: Adult Regular)   Pulse 70   Temp 97.9  F (36.6  C) (Temporal)   Resp 18   Ht 1.549 m (5' 1\")   Wt 47.2 kg (104 lb)   SpO2 95%   BMI 19.65 kg/m     Estimated body mass index is 19.65 kg/m  as calculated from the following:    Height as of this encounter: 1.549 m (5' 1\").    Weight as of this encounter: 47.2 kg (104 lb).    Physical Exam  General: Well developed, well nourished.  Skin:  Dry without rash.    Head:  Normocephalic-atraumatic.    Eye:  Normal conjunctivae.     Respiratory:  Normal respiratory effort.   Gastrointestinal:  Non-distended.    Musculoskeletal:  No TTP of cervical spine, normal ROM. No deformity or " edema.  Neurologic: No focal deficits.    Gait and balance assessed per Gait Speed Test.  Result as above.        6/2/2025   Mini Cog   Mini-Cog Not Completed (choose reason) Deaf/hard of hearing   Clock Draw Score --   3 Item Recall --         Vision Screen  Reason Vision Screen Not Completed: Attempted, unable to cooperate (unable to read)      Signed Electronically by: Tricia Cohn MD    Answers submitted by the patient for this visit:  Patient Health Questionnaire (Submitted on 6/2/2025)  If you checked off any problems, how difficult have these problems made it for you to do your work, take care of things at home, or get along with other people?: Not difficult at all  PHQ9 TOTAL SCORE: 7  Patient Health Questionnaire (G7) (Submitted on 6/2/2025)  ROMEO 7 TOTAL SCORE: 2

## 2025-06-02 NOTE — PATIENT INSTRUCTIONS
Patient Education       Check with your pharmacy regarding getting the RSV vaccine, and shingles vaccine, as Medicare will not cover these in clinic, however, they will cover it if you get at the pharmacy.       You have been provided the Preventive Care Advice document.    Additional copies can be found here: [email or web address]  Preventive Care Advice   This is general advice given by our system to help you stay healthy. However, your care team may have specific advice just for you. Please talk to your care team about your preventive care needs.  Nutrition  Eat 5 or more servings of fruits and vegetables each day.  Try wheat bread, brown rice and whole grain pasta (instead of white bread, rice, and pasta).  Get enough calcium and vitamin D. Check the label on foods and aim for 100% of the RDA (recommended daily allowance).  Lifestyle  Exercise at least 150 minutes each week  (30 minutes a day, 5 days a week).  Do muscle strengthening activities 2 days a week. These help control your weight and prevent disease.  No smoking.  Wear sunscreen to prevent skin cancer.  Have a dental exam and cleaning every 6 months.  Yearly exams  See your health care team every year to talk about:  Any changes in your health.  Any medicines your care team has prescribed.  Preventive care, family planning, and ways to prevent chronic diseases.  Shots (vaccines)   HPV shots (up to age 26), if you've never had them before.  Hepatitis B shots (up to age 59), if you've never had them before.  COVID-19 shot: Get this shot when it's due.  Flu shot: Get a flu shot every year.  Tetanus shot: Get a tetanus shot every 10 years.  Pneumococcal, hepatitis A, and RSV shots: Ask your care team if you need these based on your risk.  Shingles shot (for age 50 and up)  General health tests  Diabetes screening:  Starting at age 35, Get screened for diabetes at least every 3 years.  If you are younger than age 35, ask your care team if you should  be screened for diabetes.  Cholesterol test: At age 39, start having a cholesterol test every 5 years, or more often if advised.  Bone density scan (DEXA): At age 50, ask your care team if you should have this scan for osteoporosis (brittle bones).  Hepatitis C: Get tested at least once in your life.  STIs (sexually transmitted infections)  Before age 24: Ask your care team if you should be screened for STIs.  After age 24: Get screened for STIs if you're at risk. You are at risk for STIs (including HIV) if:  You are sexually active with more than one person.  You don't use condoms every time.  You or a partner was diagnosed with a sexually transmitted infection.  If you are at risk for HIV, ask about PrEP medicine to prevent HIV.  Get tested for HIV at least once in your life, whether you are at risk for HIV or not.  Cancer screening tests  Cervical cancer screening: If you have a cervix, begin getting regular cervical cancer screening tests starting at age 21.  Breast cancer scan (mammogram): If you've ever had breasts, begin having regular mammograms starting at age 40. This is a scan to check for breast cancer.  Colon cancer screening: It is important to start screening for colon cancer at age 45.  Have a colonoscopy test every 10 years (or more often if you're at risk) Or, ask your provider about stool tests like a FIT test every year or Cologuard test every 3 years.  To learn more about your testing options, visit:   .  For help making a decision, visit:   https://bit.ly/rn18266.  Prostate cancer screening test: If you have a prostate, ask your care team if a prostate cancer screening test (PSA) at age 55 is right for you.  Lung cancer screening: If you are a current or former smoker ages 50 to 80, ask your care team if ongoing lung cancer screenings are right for you.  For informational purposes only. Not to replace the advice of your health care provider. Copyright   2023 [employer]. All rights  reserved. Clinically reviewed by the [facility]. Grabbit [id number] - REV 01/24.  Learning About Activities of Daily Living  What are activities of daily living?     Activities of daily living (ADLs) are the basic self-care tasks you do every day. These include eating, bathing, dressing, and moving around.  As you age, and if you have health problems, you may find that it's harder to do some of these tasks. If so, your doctor can suggest ideas that may help.  To measure what kind of help you may need, your doctor will ask how well you are able to do ADLs. Let your doctor know if there are any tasks that you are having trouble doing. This is an important first step to getting help. And when you have the help you need, you can stay as independent as possible.  How will a doctor assess your ADLs?  Asking about ADLs is part of a routine health checkup your doctor will likely do as you age. Your health check might be done in a doctor's office, in your home, or at a hospital. The goal is to find out if you are having any problems that could make it hard to care for yourself or that make it unsafe for you to be on your own.  To measure your ADLs, your doctor will ask how hard it is for you to do routine tasks. Your doctor may also want to know if you have changed the way you do a task because of a health problem. Your doctor may watch how you:  Walk back and forth.  Keep your balance while you stand or walk.  Move from sitting to standing or from a bed to a chair.  Button or unbutton a shirt or sweater.  Remove and put on your shoes.  It's common to feel a little worried or anxious if you find you can't do all the things you used to be able to do. Talking with your doctor about ADLs is a way to make sure you're as safe as possible and able to care for yourself as well as you can. You may want to bring a caregiver, friend, or family member to your checkup. They can help you talk to your  doctor.  Follow-up care is a key part of your treatment and safety. Be sure to make and go to all appointments, and call your doctor if you are having problems. It's also a good idea to know your test results and keep a list of the medicines you take.  Current as of: October 24, 2024  Content Version: 14.4    9674-0854 [employer].   Care instructions adapted under license by your healthcare professional. If you have questions about a medical condition or this instruction, always ask your healthcare professional. [employer] disclaims any warranty or liability for your use of this information.    Preventing Falls: Care Instructions  Injuries and health problems such as trouble walking or poor eyesight can increase your risk of falling. So can some medicines. But there are things you can do to help prevent falls. You can exercise to get stronger. You can also arrange your home to make it safer.    Talk to your doctor about the medicines you take. Ask if any of them increase the risk of falls and whether they can be changed or stopped.   Try to exercise regularly. It can help improve your strength and balance. This can help lower your risk of falling.         Practice fall safety and prevention.   Wear low-heeled shoes that fit well and give your feet good support. Talk to your doctor if you have foot problems that make this hard.  Carry a cellphone or wear a medical alert device that you can use to call for help.  Use stepladders instead of chairs to reach high objects. Don't climb if you're at risk for falls. Ask for help, if needed.  Wear the correct eyeglasses, if you need them.        Make your home safer.   Remove rugs, cords, clutter, and furniture from walkways.  Keep your house well lit. Use night-lights in hallways and bathrooms.  Install and use sturdy handrails on stairways.  Wear nonskid footwear, even inside. Don't walk barefoot or in socks without shoes.        Be safe outside.  "  Use handrails, curb cuts, and ramps whenever possible.  Keep your hands free by using a shoulder bag or backpack.  Try to walk in well-lit areas. Watch out for uneven ground, changes in pavement, and debris.  Be careful in the winter. Walk on the grass or gravel when sidewalks are slippery. Use de-icer on steps and walkways. Add non-slip devices to shoes.    Put grab bars and nonskid mats in your shower or tub and near the toilet. Try to use a shower chair or bath bench when bathing.   Get into a tub or shower by putting in your weaker leg first. Get out with your strong side first. Have a phone or medical alert device in the bathroom with you.   Where can you learn more?  Go to https://www.[employer].Biglion/patiented  Enter G117 in the search box to learn more about \"Preventing Falls: Care Instructions.\"  Current as of: July 31, 2024  Content Version: 14.4    0150-9847 [employer].   Care instructions adapted under license by your healthcare professional. If you have questions about a medical condition or this instruction, always ask your healthcare professional. [employer] disclaims any warranty or liability for your use of this information.    Hearing Loss: Care Instructions  Overview     Hearing loss is a sudden or slow decrease in how well you hear. It can range from slight to profound. Permanent hearing loss can occur with aging. It also can happen when you are exposed long-term to loud noise. Examples include listening to loud music, riding motorcycles, or being around other loud machines.  Hearing loss can affect your work and home life. It can make you feel lonely or depressed. You may feel that you have lost your independence. But hearing aids and other devices can help you hear better and feel connected to others.  Follow-up care is a key part of your treatment and safety. Be sure to make and go to all appointments, and call your doctor if you are having problems. It's also a good " idea to know your test results and keep a list of the medicines you take.  How can you care for yourself at home?  Avoid loud noises whenever possible. This helps keep your hearing from getting worse.  Always wear hearing protection around loud noises.  Wear a hearing aid as directed.  A professional can help you pick a hearing aid that will work best for you.  You can also get hearing aids over the counter for mild to moderate hearing loss.  Have hearing tests as your doctor suggests. They can show whether your hearing has changed. Your hearing aid may need to be adjusted.  Use other devices as needed. These may include:  Telephone amplifiers and hearing aids that can connect to a television, stereo, radio, or microphone.  Devices that use lights or vibrations. These alert you to the doorbell, a ringing telephone, or a baby monitor.  Television closed-captioning. This shows the words at the bottom of the screen. Most new TVs can do this.  TTY (text telephone). This lets you type messages back and forth on the telephone instead of talking or listening. These devices are also called TDD. When messages are typed on the keyboard, they are sent over the phone line to a receiving TTY. The message is shown on a monitor.  Use text messaging, social media, and email if it is hard for you to communicate by telephone.  Try to learn a listening technique called speechreading. It is not lipreading. You pay attention to people's gestures, expressions, posture, and tone of voice. These clues can help you understand what a person is saying. Face the person you are talking to, and have them face you. Make sure the lighting is good. You need to see the other person's face clearly.  Think about counseling if you need help to adjust to your hearing loss.  When should you call for help?  Watch closely for changes in your health, and be sure to contact your doctor if:    You think your hearing is getting worse.     You have new  "symptoms, such as dizziness or nausea.   Where can you learn more?  Go to https://www.ThinkHR.net/patiented  Enter R798 in the search box to learn more about \"Hearing Loss: Care Instructions.\"  Current as of: October 27, 2024  Content Version: 14.4    7305-0548 [employer].   Care instructions adapted under license by your healthcare professional. If you have questions about a medical condition or this instruction, always ask your healthcare professional. [employer] disclaims any warranty or liability for your use of this information.    Learning About Stress  What is stress?     Stress is your body's response to a hard situation. Your body can have a physical, emotional, or mental response. Stress is a fact of life for most people, and it affects everyone differently. What causes stress for you may not be stressful for someone else.  A lot of things can cause stress. You may feel stress when you go on a job interview, take a test, or run a race. This kind of short-term stress is normal and even useful. It can help you if you need to work hard or react quickly. For example, stress can help you finish an important job on time.  Long-term stress is caused by ongoing stressful situations or events. Examples of long-term stress include long-term health problems, ongoing problems at work, or conflicts in your family. Long-term stress can harm your health.  How does stress affect your health?  When you are stressed, your body responds as though you are in danger. It makes hormones that speed up your heart, make you breathe faster, and give you a burst of energy. This is called the fight-or-flight stress response. If the stress is over quickly, your body goes back to normal and no harm is done.  But if stress happens too often or lasts too long, it can have bad effects. Long-term stress can make you more likely to get sick, and it can make symptoms of some diseases worse. If you tense up when you " are stressed, you may develop neck, shoulder, or low back pain. Stress is linked to high blood pressure and heart disease.  Stress also harms your emotional health. It can make you khoury, tense, or depressed. Your relationships may suffer, and you may not do well at work or school.  What can you do to manage stress?  You can try these things to help manage stress:   Do something active. Exercise or activity can help reduce stress. Walking is a great way to get started. Even everyday activities such as housecleaning or yard work can help.  Try yoga or elizabeth chi. These techniques combine exercise and meditation. You may need some training at first to learn them.  Do something you enjoy. For example, listen to music or go to a movie. Practice your hobby or do volunteer work.  Meditate. This can help you relax, because you are not worrying about what happened before or what may happen in the future.  Do guided imagery. Imagine yourself in any setting that helps you feel calm. You can use online videos, books, or a teacher to guide you.  Do breathing exercises. For example:  From a standing position, bend forward from the waist with your knees slightly bent. Let your arms dangle close to the floor.  Breathe in slowly and deeply as you return to a standing position. Roll up slowly and lift your head last.  Hold your breath for just a few seconds in the standing position.  Breathe out slowly and bend forward from the waist.  Let your feelings out. Talk, laugh, cry, and express anger when you need to. Talking with supportive friends or family, a counselor, or a mar leader about your feelings is a healthy way to relieve stress. Avoid discussing your feelings with people who make you feel worse.  Write. It may help to write about things that are bothering you. This helps you find out how much stress you feel and what is causing it. When you know this, you can find better ways to cope.  What can you do to prevent stress?  You  "might try some of these things to help prevent stress:  Manage your time. This helps you find time to do the things you want and need to do.  Get enough sleep. Your body recovers from the stresses of the day while you are sleeping.  Get support. Your family, friends, and community can make a difference in how you experience stress.  Limit your news feed. Avoid or limit time on social media or news that may make you feel stressed.  Do something active. Exercise or activity can help reduce stress. Walking is a great way to get started.  Where can you learn more?  Go to https://www.[employer]HS.net/patiented  Enter N032 in the search box to learn more about \"Learning About Stress.\"  Current as of: October 24, 2024  Content Version: 14.4 2024-2025 [employer].   Care instructions adapted under license by your healthcare professional. If you have questions about a medical condition or this instruction, always ask your healthcare professional. [employer] disclaims any warranty or liability for your use of this information.    Learning About Sleeping Well  What does sleeping well mean?     Sleeping well means getting enough sleep to feel good and stay healthy. How much sleep is enough varies among people.  The number of hours you sleep and how you feel when you wake up are both important. If you do not feel refreshed, you probably need more sleep. Another sign of not getting enough sleep is feeling tired during the day.  Experts recommend that adults get at least 7 or more hours of sleep per day. Children and older adults need more sleep.  Why is getting enough sleep important?  Getting enough quality sleep is a basic part of good health. When your sleep suffers, your physical health, mood, and your thoughts can suffer too. You may find yourself feeling more grumpy or stressed. Not getting enough sleep also can lead to serious problems, including injury, accidents, anxiety, and depression.  What " "might cause poor sleeping?  Many things can cause sleep problems, including:  Changes to your sleep schedule.  Stress. Stress can be caused by fear about a single event, such as giving a speech. Or you may have ongoing stress, such as worry about work or school.  Depression, anxiety, and other mental or emotional conditions.  Changes in your sleep habits or surroundings. This includes changes that happen where you sleep, such as noise, light, or sleeping in a different bed. It also includes changes in your sleep pattern, such as having jet lag or working a late shift.  Health problems, such as pain, breathing problems, and restless legs syndrome.  Lack of regular exercise.  Using alcohol, nicotine, or caffeine before bed.  How can you help yourself?  Here are some tips that may help you sleep more soundly and wake up feeling more refreshed.  Your sleeping area   Use your bedroom only for sleeping and sex. A bit of light reading may help you fall asleep. But if it doesn't, do your reading elsewhere in the house. Try not to use your TV, computer, smartphone, or tablet while you are in bed.  Be sure your bed is big enough to stretch out comfortably, especially if you have a sleep partner.  Keep your bedroom quiet, dark, and cool. Use curtains, blinds, or a sleep mask to block out light. To block out noise, use earplugs, soothing music, or a \"white noise\" machine.  Your evening and bedtime routine   Create a relaxing bedtime routine. You might want to take a warm shower or bath, or listen to soothing music.  Go to bed at the same time every night. And get up at the same time every morning, even if you feel tired.  What to avoid   Limit caffeine (coffee, tea, caffeinated sodas) during the day, and don't have any for at least 6 hours before bedtime.  Avoid drinking alcohol before bedtime. Alcohol can cause you to wake up more often during the night.  Try not to smoke or use tobacco, especially in the evening. Nicotine can " "keep you awake.  Limit naps during the day, especially close to bedtime.  Avoid lying in bed awake for too long. If you can't fall asleep or if you wake up in the middle of the night and can't get back to sleep within about 20 minutes, get out of bed and go to another room until you feel sleepy.  Avoid taking medicine right before bed that may keep you awake or make you feel hyper or energized. Your doctor can tell you if your medicine may do this and if you can take it earlier in the day.  If you can't sleep   Imagine yourself in a peaceful, pleasant scene. Focus on the details and feelings of being in a place that is relaxing.  Get up and do a quiet or boring activity until you feel sleepy.  Avoid drinking any liquids before going to bed to help prevent waking up often to use the bathroom.  Where can you learn more?  Go to https://www.[employer].net/patiented  Enter J942 in the search box to learn more about \"Learning About Sleeping Well.\"  Current as of: July 31, 2024  Content Version: 14.4    5272-1590 [employer].   Care instructions adapted under license by your healthcare professional. If you have questions about a medical condition or this instruction, always ask your healthcare professional. [employer] disclaims any warranty or liability for your use of this information.    Bladder Training: Care Instructions  Your Care Instructions     Bladder training is used to treat urge incontinence and stress incontinence. Urge incontinence means that the need to urinate comes on so fast that you can't get to a toilet in time. Stress incontinence means that you leak urine because of pressure on your bladder. For example, it may happen when you laugh, cough, or lift something heavy.  Bladder training can increase how long you can wait before you have to urinate. It can also help your bladder hold more urine. And it can give you better control over the urge to urinate.  It is important to remember " that bladder training takes a few weeks to a few months to make a difference. You may not see results right away, but don't give up.  Follow-up care is a key part of your treatment and safety. Be sure to make and go to all appointments, and call your doctor if you are having problems. It's also a good idea to know your test results and keep a list of the medicines you take.  How can you care for yourself at home?  Work with your doctor to come up with a bladder training program that is right for you. You may use one or more of the following methods.  Delayed urination  In the beginning, try to keep from urinating for 5 minutes after you first feel the need to go.  While you wait, take deep, slow breaths to relax. Kegel exercises can also help you delay the need to go to the bathroom.  After some practice, when you can easily wait 5 minutes to urinate, try to wait 10 minutes before you urinate.  Slowly increase the waiting period until you are able to control when you have to urinate.  Scheduled urination  Empty your bladder when you first wake up in the morning.  Schedule times throughout the day when you will urinate.  Start by going to the bathroom every hour, even if you don't need to go.  Slowly increase the time between trips to the bathroom.  When you have found a schedule that works well for you, keep doing it.  If you wake up during the night and have to urinate, do it. Apply your schedule to waking hours only.  Kegel exercises  These tighten and strengthen pelvic muscles, which can help you control the flow of urine. (If doing these exercises causes pain, stop doing them and talk with your doctor.) To do Kegel exercises:  Squeeze your muscles as if you were trying not to pass gas. Or squeeze your muscles as if you were stopping the flow of urine. Your belly, legs, and buttocks shouldn't move.  Hold the squeeze for 3 seconds, then relax for 5 to 10 seconds.  Start with 3 seconds, then add 1 second each week  "until you are able to squeeze for 10 seconds.  Repeat the exercise 10 times a session. Do 3 to 8 sessions a day.  When should you call for help?  Watch closely for changes in your health, and be sure to contact your doctor if:    Your incontinence is getting worse.     You do not get better as expected.   Where can you learn more?  Go to https://www.[employer].net/patiented  Enter V684 in the search box to learn more about \"Bladder Training: Care Instructions.\"  Current as of: April 30, 2024  Content Version: 14.4    5657-5303 [employer].   Care instructions adapted under license by your healthcare professional. If you have questions about a medical condition or this instruction, always ask your healthcare professional. [employer] disclaims any warranty or liability for your use of this information.    Learning About Depression Screening  What is depression screening?  Depression screening is a way to see if you have depression symptoms. It may be done by a doctor or counselor. It's often part of a routine checkup. That's because your mental health is just as important as your physical health.  Depression is a mental health condition that affects how you feel, think, and act. You may:  Have less energy.  Lose interest in your daily activities.  Feel sad and grouchy for a long time.  Depression is very common. It affects people of all ages.  Many things can lead to depression. Some people become depressed after they have a stroke or find out they have a major illness like cancer or heart disease. The death of a loved one or a breakup may lead to depression. It can run in families. Most experts believe that a combination of inherited genes and stressful life events can cause it.  What happens during screening?  You may be asked to fill out a form about your depression symptoms. You and the doctor will discuss your answers. The doctor may ask you more questions to learn more about how you think, " "act, and feel.  What happens after screening?  If you have symptoms of depression, your doctor will talk to you about your options.  Doctors usually treat depression with medicines or counseling. Often, combining the two works best. Many people don't get help because they think that they'll get over the depression on their own. But people with depression may not get better unless they get treatment.  The cause of depression is not well understood. There may be many factors involved. But if you have depression, it's not your fault.  A serious symptom of depression is thinking about death or suicide. If you or someone you care about talks about this or about feeling hopeless, get help right away.  It's important to know that depression can be treated. Medicine, counseling, and self-care may help.  Where can you learn more?  Go to https://www.[employer]net/patiented  Enter T185 in the search box to learn more about \"Learning About Depression Screening.\"  Current as of: July 31, 2024  Content Version: 14.4    [id number] [employer].   Care instructions adapted under license by your healthcare professional. If you have questions about a medical condition or this instruction, always ask your healthcare professional. [employer] disclaims any warranty or liability for your use of this information.       "

## 2025-06-03 ENCOUNTER — PATIENT OUTREACH (OUTPATIENT)
Dept: CARE COORDINATION | Facility: CLINIC | Age: 85
End: 2025-06-03
Payer: MEDICARE

## 2025-06-03 DIAGNOSIS — R63.0 DECREASED APPETITE: ICD-10-CM

## 2025-06-04 RX ORDER — MULTIVITAMIN
1 TABLET ORAL DAILY
Qty: 90 TABLET | Refills: 3 | OUTPATIENT
Start: 2025-06-04

## 2025-08-07 DIAGNOSIS — M51.360 DEGENERATION OF INTERVERTEBRAL DISC OF LUMBAR REGION WITH DISCOGENIC BACK PAIN: ICD-10-CM

## 2025-08-07 DIAGNOSIS — M47.816 LUMBAR SPONDYLOSIS: ICD-10-CM

## 2025-08-07 RX ORDER — MELOXICAM 7.5 MG/1
7.5 TABLET ORAL DAILY
Qty: 30 TABLET | Refills: 1 | Status: SHIPPED | OUTPATIENT
Start: 2025-08-07